# Patient Record
Sex: MALE | Race: BLACK OR AFRICAN AMERICAN | Employment: UNEMPLOYED | URBAN - METROPOLITAN AREA
[De-identification: names, ages, dates, MRNs, and addresses within clinical notes are randomized per-mention and may not be internally consistent; named-entity substitution may affect disease eponyms.]

---

## 2018-01-22 ENCOUNTER — OFFICE VISIT (OUTPATIENT)
Dept: FAMILY MEDICINE CLINIC | Age: 48
End: 2018-01-22

## 2018-01-22 VITALS
DIASTOLIC BLOOD PRESSURE: 96 MMHG | OXYGEN SATURATION: 99 % | TEMPERATURE: 97.6 F | HEART RATE: 64 BPM | HEIGHT: 73 IN | RESPIRATION RATE: 16 BRPM | SYSTOLIC BLOOD PRESSURE: 147 MMHG | BODY MASS INDEX: 31.14 KG/M2 | WEIGHT: 235 LBS

## 2018-01-22 DIAGNOSIS — B35.1 ONYCHOMYCOSIS: ICD-10-CM

## 2018-01-22 DIAGNOSIS — Z00.00 VISIT FOR WELL MAN HEALTH CHECK: Primary | ICD-10-CM

## 2018-01-22 RX ORDER — CICLOPIROX 80 MG/ML
SOLUTION TOPICAL
Qty: 1 BOTTLE | Refills: 1 | Status: SHIPPED | OUTPATIENT
Start: 2018-01-22 | End: 2018-07-30

## 2018-01-22 NOTE — MR AVS SNAPSHOT
[address] 
[phone] Patient: Rosalba Gipson MRN: [id number] GXF:[birth year] Visit Information Date & Time Provider Department Dept. Phone Encounter #  
 1/22/2018  9:45 AM Kleber Mensah MD [address] [place] [address] [phone] [id number] Upcoming Health Maintenance Date Due DTaP/Tdap/Td series (1 - Tdap) 5/11/1993 Influenza Age 5 to Adult 8/1/2017 Allergies as of 1/22/2018  Review Complete On: 1/22/2018 By: Kleber Mensah MD  
 No Known Allergies Current Immunizations  Never Reviewed No immunizations on file. Not reviewed this visit You Were Diagnosed With   
  
 Codes Comments Visit for [facility] health check    -  Primary ICD-10-CM: Z00.00 ICD-9-CM: V70.0 Onychomycosis     ICD-10-CM: B35.1 ICD-9-CM: 110.1 Vitals BP Pulse Temp Resp Height(growth percentile) Weight(growth percentile) (!) 147/96 64 97.6 °F (36.4 °C) (Oral) 16 6' 1\" (1.854 m) 235 lb (106.6 kg) SpO2 BMI Smoking Status 99% 31 kg/m2 Never Smoker Vitals History BMI and BSA Data Body Mass Index Body Surface Area  
 31 kg/m 2 2.34 m 2 Preferred Pharmacy Pharmacy Name Phone [id number], [address] [place], [place] [phone] Your Updated Medication List  
  
   
This list is accurate as of: 1/22/18  4:39 PM.  Always use your most recent med list.  
  
  
  
  
 ciclopirox 8 % solution Commonly known as:  PENLAC Apply to affected toenail daily. Remove weekly. Prescriptions Sent to Pharmacy Refills  
 ciclopirox (PENLAC) 8 % solution 1 Sig: Apply to affected toenail daily. Remove weekly. Class: Normal  
 [id number], [address] [place] [address] #: [phone] We Performed the Following CBC W/O DIFF [97883 CPT(R)] LIPID PANEL [04593 CPT(R)] METABOLIC PANEL, COMPREHENSIVE [24889 CPT(R)] TSH 3RD GENERATION [23464 CPT(R)] Introducing [facility]! [employer] introduces [employer] patient portal. Now you can access parts of your medical record, email your doctor's office, and request medication refills online. 1. In your internet browser, go to [email or web address]. [employer] 2. Click on the First Time User? Click Here link in the Sign In box. You will see the New Member Sign Up page. 3. Enter your [employer] Access Code exactly as it appears below. You will not need to use this code after youve completed the sign-up process. If you do not sign up before the expiration date, you must request a new code. · [employer] Access Code: [email or web address] Expires: 4/22/2018  4:39 PM 
 
4. Enter the last four digits of your Social Security Number (xxxx) and Date of Birth (mm/dd/yyyy) as indicated and click Submit. You will be taken to the next sign-up page. 5. Create a [employer] ID. This will be your [employer] login ID and cannot be changed, so think of one that is secure and easy to remember. 6. Create a [employer] password. You can change your password at any time. 7. Enter your Password Reset Question and Answer. This can be used at a later time if you forget your password. 8. Enter your e-mail address. You will receive e-mail notification when new information is available in [address] 9. Click Sign Up. You can now view and download portions of your medical record. 10. Click the Download Summary menu link to download a portable copy of your medical information. If you have questions, please visit the Frequently Asked Questions section of the [employer] website. Remember, [employer] is NOT to be used for urgent needs. For medical emergencies, dial 911. Now available from your iPhone and Android! Please provide this summary of care documentation to your next provider. If you have any questions after today's visit, please call [phone].

## 2018-01-22 NOTE — PROGRESS NOTES
Jeanna Lopez is a 41 y.o. male who presents for annual physical and to establish care. No significant PMHx. He is currently followed by [facility] for low back pain that started with moving a sofa. He was taking gabapentin for radicular sx but stopped. He is currently doing PT. No other active medical problems and no medications. Reports a good diet. Exercises regularly (decreased recently due to back pain). Reports good sleep. Has 3 sons (93,12,88). PMHx:None  PSHx: Appendectomy; tonsillectomy     Meds: none    Allergies: Allergies no known allergies    Smoker:  History   Smoking Status    Never Smoker   Smokeless Tobacco    Never Used       ETOH:   History   Alcohol Use    Yes       FH: No family history on file. ROS:  General/Constitutional:   No headache, fever, fatigue, weight loss or weight gain       Eyes:   No redness, pruritis, pain, visual changes, swelling, or discharge      Ears:    No pain, loss or changes in hearing     Neck:   No swelling, masses, stiffness, pain, or limited movement     Cardiac:    No chest pain      Respiratory:   No cough or shortness of breath     GI:   No nausea/vomiting, diarrhea, abdominal pain, bloody or dark stools       :   No dysuria or  hematuria    Neurological:   No loss of consciousness, dizziness, seizures, dysarthria, cognitive changes, memory changes,  problems with balance, or unilateral weakness     Skin: No rash     Physical Exam:  Visit Vitals    BP (!) 147/96    Pulse 64    Temp 97.6 °F (36.4 °C) (Oral)    Resp 16    Ht 6' 1\" (1.854 m)    Wt 235 lb (106.6 kg)    SpO2 99%    BMI 31 kg/m2     GEN: No apparent distress. Alert and oriented and responds to all questions appropriately. EYES:  Conjunctiva clear; pupils round and reactive to light; extraocular movements are intact. EAR: External ears are normal.  Tympanic membranes are clear and without effusion. NOSE: Turbinates are within normal limits.   No drainage  OROPHYARYNX: No oral lesions or exudates. NECK:  Supple; no masses; thyroid normal           LUNGS: Respirations unlabored; clear to auscultation bilaterally  CARDIOVASCULAR: Regular, rate, and rhythm without murmurs, gallops or rubs   ABDOMEN: Soft; nontender; nondistended; normoactive bowel sounds; no masses or organomegaly  NEUROLOGIC:  No focal neurologic deficits. Strength and sensation grossly intact. Coordination and gait grossly intact. EXT: Well perfused. No edema. SKIN: No obvious rashes. Assessment:    ICD-10-CM ICD-9-CM    1. Visit for well man health check N47.30 W63.8 METABOLIC PANEL, COMPREHENSIVE      LIPID PANEL      CBC W/O DIFF      TSH 3RD GENERATION   2. Onychomycosis B35.1 110.1        Plan:  Labs as above. Discussed diet and exercise  Will start PenLac for Onychomycosis of the right 4th and 5th toes  Declined flu vaccine  Tdap up to date  He will monitor BP at home. If staying above 140/90 then will f/u in clinic.      RTC: 1 year and PRN

## 2018-01-23 LAB
ALBUMIN SERPL-MCNC: 4.5 G/DL (ref 3.5–5.5)
ALBUMIN/GLOB SERPL: 1.6 {RATIO} (ref 1.2–2.2)
ALP SERPL-CCNC: 62 IU/L (ref 39–117)
ALT SERPL-CCNC: 28 IU/L (ref 0–44)
AST SERPL-CCNC: 26 IU/L (ref 0–40)
BILIRUB SERPL-MCNC: 0.4 MG/DL (ref 0–1.2)
BUN SERPL-MCNC: 13 MG/DL (ref 6–24)
BUN/CREAT SERPL: 10 (ref 9–20)
CALCIUM SERPL-MCNC: 9.2 MG/DL (ref 8.7–10.2)
CHLORIDE SERPL-SCNC: 101 MMOL/L (ref 96–106)
CHOLEST SERPL-MCNC: 186 MG/DL (ref 100–199)
CO2 SERPL-SCNC: 25 MMOL/L (ref 18–29)
CREAT SERPL-MCNC: 1.26 MG/DL (ref 0.76–1.27)
ERYTHROCYTE [DISTWIDTH] IN BLOOD BY AUTOMATED COUNT: 13.7 % (ref 12.3–15.4)
GLOBULIN SER CALC-MCNC: 2.8 G/DL (ref 1.5–4.5)
GLUCOSE SERPL-MCNC: 113 MG/DL (ref 65–99)
HCT VFR BLD AUTO: 39.6 % (ref 37.5–51)
HDLC SERPL-MCNC: 38 MG/DL
HGB BLD-MCNC: 13.9 G/DL (ref 13–17.7)
INTERPRETATION, 910389: NORMAL
LDLC SERPL CALC-MCNC: 113 MG/DL (ref 0–99)
MCH RBC QN AUTO: 27.4 PG (ref 26.6–33)
MCHC RBC AUTO-ENTMCNC: 35.1 G/DL (ref 31.5–35.7)
MCV RBC AUTO: 78 FL (ref 79–97)
PLATELET # BLD AUTO: 243 X10E3/UL (ref 150–379)
POTASSIUM SERPL-SCNC: 3.7 MMOL/L (ref 3.5–5.2)
PROT SERPL-MCNC: 7.3 G/DL (ref 6–8.5)
RBC # BLD AUTO: 5.07 X10E6/UL (ref 4.14–5.8)
SODIUM SERPL-SCNC: 142 MMOL/L (ref 134–144)
TRIGL SERPL-MCNC: 176 MG/DL (ref 0–149)
TSH SERPL DL<=0.005 MIU/L-ACNC: 1.68 UIU/ML (ref 0.45–4.5)
VLDLC SERPL CALC-MCNC: 35 MG/DL (ref 5–40)
WBC # BLD AUTO: 2.9 X10E3/UL (ref 3.4–10.8)

## 2018-01-26 ENCOUNTER — TELEPHONE (OUTPATIENT)
Dept: FAMILY MEDICINE CLINIC | Age: 48
End: 2018-01-26

## 2018-01-26 NOTE — TELEPHONE ENCOUNTER
Pt requesting cheaper alternative for Penlac sent [facility] pharmacy.  Thank you

## 2018-01-26 NOTE — TELEPHONE ENCOUNTER
----- Message from Rianna Monzon sent at 1/26/2018  9:12 AM EST -----  Regarding: Dr. Clark Buerger is asking for a alternate medication for his most recent medication (name unknown) sent to [facility] on [address] Best Contact [phone].

## 2018-01-30 ENCOUNTER — TELEPHONE (OUTPATIENT)
Dept: FAMILY MEDICINE CLINIC | Age: 48
End: 2018-01-30

## 2018-01-30 NOTE — TELEPHONE ENCOUNTER
----- Message from Mikey Cowden sent at 1/30/2018  2:30 PM EST -----  Regarding: Dr. Katt Pickard, from [facility] is calling to check the status of a Pre-authorization that was faxed on 01/25/2018. Best contact [phone].

## 2018-07-30 ENCOUNTER — OFFICE VISIT (OUTPATIENT)
Dept: FAMILY MEDICINE CLINIC | Age: 48
End: 2018-07-30

## 2018-07-30 ENCOUNTER — HOSPITAL ENCOUNTER (EMERGENCY)
Age: 48
Discharge: HOME | End: 2018-07-30
Attending: EMERGENCY MEDICINE
Payer: COMMERCIAL

## 2018-07-30 VITALS
BODY MASS INDEX: 28.49 KG/M2 | TEMPERATURE: 98.4 F | OXYGEN SATURATION: 98 % | WEIGHT: 215 LBS | RESPIRATION RATE: 16 BRPM | HEIGHT: 73 IN | HEART RATE: 58 BPM | DIASTOLIC BLOOD PRESSURE: 108 MMHG | SYSTOLIC BLOOD PRESSURE: 161 MMHG

## 2018-07-30 VITALS
WEIGHT: 215 LBS | SYSTOLIC BLOOD PRESSURE: 169 MMHG | TEMPERATURE: 98.4 F | BODY MASS INDEX: 28.49 KG/M2 | HEART RATE: 68 BPM | OXYGEN SATURATION: 98 % | HEIGHT: 73 IN | DIASTOLIC BLOOD PRESSURE: 109 MMHG | RESPIRATION RATE: 16 BRPM

## 2018-07-30 DIAGNOSIS — Z23 NEED FOR TDAP VACCINATION: ICD-10-CM

## 2018-07-30 DIAGNOSIS — I10 ESSENTIAL HYPERTENSION: Primary | ICD-10-CM

## 2018-07-30 DIAGNOSIS — Z91.09 ENVIRONMENTAL ALLERGIES: ICD-10-CM

## 2018-07-30 DIAGNOSIS — R94.31 ABNORMAL EKG: ICD-10-CM

## 2018-07-30 DIAGNOSIS — I11.9 LVH (LEFT VENTRICULAR HYPERTROPHY) DUE TO HYPERTENSIVE DISEASE, WITHOUT HEART FAILURE: ICD-10-CM

## 2018-07-30 DIAGNOSIS — R06.83 SNORING: ICD-10-CM

## 2018-07-30 DIAGNOSIS — I10 ESSENTIAL HYPERTENSION: ICD-10-CM

## 2018-07-30 DIAGNOSIS — J02.9 SORE THROAT: Primary | ICD-10-CM

## 2018-07-30 LAB
S PYO AG THROAT QL: NEGATIVE
VALID INTERNAL CONTROL?: YES

## 2018-07-30 PROCEDURE — 93005 ELECTROCARDIOGRAM TRACING: CPT

## 2018-07-30 PROCEDURE — 99283 EMERGENCY DEPT VISIT LOW MDM: CPT

## 2018-07-30 RX ORDER — FLUTICASONE PROPIONATE 50 MCG
SPRAY, SUSPENSION (ML) NASAL
Qty: 1 BOTTLE | Refills: 3 | Status: SHIPPED | OUTPATIENT
Start: 2018-07-30

## 2018-07-30 RX ORDER — LISINOPRIL 10 MG/1
10 TABLET ORAL DAILY
Qty: 30 TAB | Refills: 0 | Status: SHIPPED | OUTPATIENT
Start: 2018-07-30 | End: 2018-08-20 | Stop reason: ALTCHOICE

## 2018-07-30 RX ORDER — GABAPENTIN 300 MG/1
300 CAPSULE ORAL
COMMUNITY
Start: 2017-12-28 | End: 2018-07-30

## 2018-07-30 NOTE — MR AVS SNAPSHOT
[address] 
[phone] Patient: Eusebio Samuels MRN: [id number] NXA: Visit Information Date & Time Provider Department Dept. Phone Encounter #  
 2018  6:35 PM Carmelita Biggs MD [address] [facility] [phone] [id number] Upcoming Health Maintenance Date Due DTaP/Tdap/Td series (1 - Tdap) 1993 Influenza Age 5 to Adult 2018 Allergies as of 2018  Review Complete On: 2018 By: Carmelita Biggs MD  
 No Known Allergies Current Immunizations  Never Reviewed No immunizations on file. Not reviewed this visit You Were Diagnosed With   
  
 Codes Comments Sore throat    -  Primary ICD-10-CM: J02.9 ICD-9-CM: 720 Snoring     ICD-10-CM: R06.83 
ICD-9-CM: 786.09 StopBang = 6 Essential hypertension     ICD-10-CM: I10 
ICD-9-CM: 401.9 Environmental allergies     ICD-10-CM: Z91.09 
ICD-9-CM: V15.09 Vitals BP Pulse Temp Resp Height(growth percentile) Weight(growth percentile) (!) 169/109 (BP 1 Location: Left arm, BP Patient Position: Sitting) 68 98.4 °F (36.9 °C) (Oral) 16 6' 1\" (1.854 m) 215 lb (97.5 kg) SpO2 BMI Smoking Status 98% 28.37 kg/m2 Never Smoker Vitals History BMI and BSA Data Body Mass Index Body Surface Area  
 28.37 kg/m 2 2.24 m 2 Preferred Pharmacy [facility] Phone [id number], [address] [facility] [phone] Your Updated Medication List  
  
   
This list is accurate as of 18  7:15 PM.  Always use your most recent med list.  
  
  
  
  
 fluticasone 50 mcg/actuation nasal spray Commonly known as:  Marsh Fails 2 puffs each nostril at bedtime Prescriptions Sent to Pharmacy Refills  
 fluticasone (FLONASE) 50 mcg/actuation nasal spray 3 Si puffs each nostril at bedtime  Class: Normal  
 Pharmacy: [facility] #[id number] #: [phone] We Performed the Following AMB POC EKG ROUTINE W/ 12 LEADS, INTER & REP [01869 CPT(R)] AMB POC RAPID STREP A [38798 CPT(R)] REFERRAL TO SLEEP STUDIES [REF99 Custom] Comments:  
 See Dr. Jaycee Costello for sleep evaluation #[phone] Referral Information Referral ID Referred By Referred To  
  
 [id number] MD Robi Santa [address] [address] Phone: [phone] Fax: [phone] Visits Status Start Date End Date 1 New Request 7/30/18 7/30/19 If your referral has a status of pending review or denied, additional information will be sent to support the outcome of this decision. Patient Instructions Aspirin 162 mg given 7:15PM 
 
Go to [facility] ER Come back soon for Tetanus vaccine and further discussion about blood pressure and snoring Introducing [facility]! [facility] introduces [employer] patient portal. Now you can access parts of your medical record, email your doctor's office, and request medication refills online. 1. In your internet browser, go to [email or web address]. [employer] 2. Click on the First Time User? Click Here link in the Sign In box. You will see the New Member Sign Up page. 3. Enter your [employer] Access Code exactly as it appears below. You will not need to use this code after youve completed the sign-up process. If you do not sign up before the expiration date, you must request a new code. · [employer] Access Code: [email or web address] Expires: 10/28/2018  6:29 PM 
 
4. Enter the last four digits of your Social Security Number (xxxx) and Date of Birth (mm/dd/yyyy) as indicated and click Submit. You will be taken to the next sign-up page. 5. Create a [employer] ID. This will be your [employer] login ID and cannot be changed, so think of one that is secure and easy to remember. 6. Create a [employer] password. You can change your password at any time. 7. Enter your Password Reset Question and Answer. This can be used at a later time if you forget your password. 8. Enter your e-mail address. You will receive e-mail notification when new information is available in [address] 9. Click Sign Up. You can now view and download portions of your medical record. 10. Click the Download Summary menu link to download a portable copy of your medical information. If you have questions, please visit the Frequently Asked Questions section of the [employer] website. Remember, [employer] is NOT to be used for urgent needs. For medical emergencies, dial 911. Now available from your iPhone and Android! Please provide this summary of care documentation to your next provider. If you have any questions after today's visit, please call [phone].

## 2018-07-30 NOTE — PATIENT INSTRUCTIONS
Aspirin 162 mg given 7:15PM 
 
Go to [facility] ER Come back soon for Tetanus vaccine and further discussion about blood pressure and snoring

## 2018-07-30 NOTE — ED TRIAGE NOTES
Pt sent by PCP Dr. Sandrita Beckwith for abnormal EKG and hypertension. Pt was at office for sore throat. Pt denies CP.

## 2018-07-30 NOTE — PROGRESS NOTES
Min Etienne is a 57 y.o. male Issues discussed today include: 
 
 
 
Signs and symptoms:  ST 
Duration:  4 days Context:  No known sick contacts Location:  ST 
Quality:  sore Severity:  8/10 Timing:  Getting some better Modifying factors:  BP up, +snorer (StopBang = 6) Body mass index is 28.37 kg/(m^2). Data reviewed or ordered today:  Strep negative, EKG today= flipped T in inf/lateral leads. No ST elevation Other problems include: There is no problem list on file for this patient. Medications:  none Allergies: 
No Known Allergies LMP:  No LMP for male patient. Social History Social History  Marital status: SINGLE Spouse name: N/A  
 Number of children: N/A  
 Years of education: N/A Occupational History  Not on file. Social History Main Topics  Smoking status: Never Smoker  Smokeless tobacco: Never Used  Alcohol use Yes  Drug use: No  
 Sexual activity: Yes  
  Partners: Female Birth control/ protection: Condom Other Topics Concern  Not on file Social History Narrative Family History Problem Relation Age of Onset  Hypertension Mother  Diabetes Mother  Hypertension Father Other family history:   
 
Meaningful use:  done ROS: 
Headaches:  no 
Chest Pain:  no 
SOB:  no 
Fevers:  no Falls:  no Other significant ROS:   
 
No LMP for male patient. Physical Exam 
Visit Vitals  BP (!) 169/109 (BP 1 Location: Left arm, BP Patient Position: Sitting)  Pulse 68  Temp 98.4 °F (36.9 °C) (Oral)  Resp 16  
 Ht 6' 1\" (1.854 m)  Wt 215 lb (97.5 kg)  SpO2 98%  BMI 28.37 kg/m2 BP Readings from Last 3 Encounters:  
07/30/18 (!) 184/112  
07/30/18 (!) 169/109  
01/22/18 (!) 147/96 Constitutional:  Appears well,  No Acute Distress, Vitals noted Psychiatric:   Affect normal, Alert and cooperative, Oriented to person/place/time Eyes:   Pupils equally round and reactive, EOMI, conjunctiva clear, eyelids normal 
ENT:   External ears and nose normal/lips, teeth=OK/gums normal, TMs and Orophyarynx normal 
Neck:   general inspection and Thyroid normal.  No abnormal cervical or supraclavicular nodes Lungs:   clear to auscultation, good respiratory effort Heart: Ausculation normal.  Regular rhythm. No cardiac murmurs. No carotid bruits or palpable thrills Chest wall normal 
Abdominal exam:   normal.  Liver and spleen normal.  No bruits/masses/tenderness Extremities:   without edema, good peripheral pulses Skin:   Warm to palpation, without rashes, bruising, or suspicious lesions Neck 18 inches Assessment:   
There is no problem list on file for this patient. Today's diagnoses are: ICD-10-CM ICD-9-CM 1. Sore throat J02.9 462 AMB POC RAPID STREP A  
2. Snoring R06.83 786.09 REFERRAL TO SLEEP STUDIES StopBang = 6, needs sleep eval 
  
3. Essential hypertension I10 401.9 AMB POC EKG ROUTINE W/ 12 LEADS, INTER & REP  
 new diagnosis, is on NO Rx yet 4. Environmental allergies Z91.09 V15.09 fluticasone (FLONASE) 50 mcg/actuation nasal spray 5. Abnormal EKG R94.31 794.31   
 to ER 6. Need for Tdap vaccination Z23 V06.1 defer vaccine today Plan: 
Orders Placed This Encounter  REFERRAL TO SLEEP STUDIES Referral Priority:   Routine Referral Type:   Consultation Referral Reason:   Specialty Services Required Referral Location:   [facility] Referred to Provider:   Albina Weaver MD  
  Requested Specialty:   Sleep Medicine  AMB POC RAPID STREP A  
 AMB POC EKG ROUTINE W/ 12 LEADS, INTER & REP Order Specific Question:   Reason for Exam: Answer:   hypertension  DISCONTD: gabapentin (NEURONTIN) 300 mg capsule Sig: Take 300 mg by mouth.  fluticasone (FLONASE) 50 mcg/actuation nasal spray Si puffs each nostril at bedtime Dispense:  1 Bottle Refill:  3 See patient instructions Patient Instructions Aspirin 162 mg given 7:15PM 
 
Go to [facility] ER Come back soon for Tetanus vaccine and further discussion about blood pressure and snoring 
 
 
 
refresh note:  done AVS Printed:  done Diagnoses and all orders for this visit: 1. Sore throat -     AMB POC RAPID STREP A 
 
2. Snoring Comments: 
StopBang = 6, needs sleep eval 
 
Orders: 
-     REFERRAL TO SLEEP STUDIES 3. Essential hypertension Comments: 
new diagnosis, is on NO Rx yet Orders: -     AMB POC EKG ROUTINE W/ 12 LEADS, INTER & REP 4. Environmental allergies 
-     fluticasone (FLONASE) 50 mcg/actuation nasal spray; 2 puffs each nostril at bedtime 5. Abnormal EKG Comments: 
to ER 6. Need for Tdap vaccination Comments: 
defer vaccine today Patient left in stable condition to go to ER with wife driving. Aspirin 162 mg given ER notified

## 2018-07-30 NOTE — PROGRESS NOTES
Chief Complaint Patient presents with  Sore Throat  
  4 days sore throat. Pain reached 10 in throat. Throat feels scratchy, raw and inflammed. No fever, Nasal drainage down throat. Occasional cough, but inconsistent. 1. Have you been to the ER, urgent care clinic since your last visit? Hospitalized since your last visit? No 
 
2. Have you seen or consulted any other health care providers outside of the [address] [place] [address] since your last visit? Include any pap smears or colon screening.  No

## 2018-07-31 LAB
ATRIAL RATE: 58 BPM
CALCULATED P AXIS, ECG09: 39 DEGREES
CALCULATED R AXIS, ECG10: -7 DEGREES
CALCULATED T AXIS, ECG11: -70 DEGREES
DIAGNOSIS, 93000: NORMAL
P-R INTERVAL, ECG05: 230 MS
Q-T INTERVAL, ECG07: 410 MS
QRS DURATION, ECG06: 80 MS
QTC CALCULATION (BEZET), ECG08: 402 MS
VENTRICULAR RATE, ECG03: 58 BPM

## 2018-07-31 NOTE — DISCHARGE INSTRUCTIONS
Learning About ACE Inhibitors  Introduction    ACE (angiotensin-converting enzyme) inhibitors stop the release of an enzyme. This enzyme makes your blood vessels smaller. Without it, your blood vessels relax and get bigger. This lowers your blood pressure. These medicines also increase how much water and salt go into your urine. This also lowers blood pressure. You may take this kind of medicine if you have high blood pressure. Or you may take it if you have heart problems, kidney problems, or diabetes. If you have coronary artery disease, this medicine can help prevent heart attacks and strokes. Examples  · Benazepril (Lotensin)  · Lisinopril (Prinivil, Zestril)  · Ramipril (Altace)  This is not a complete list.  Possible side effects  Side effects may include:  · A cough. · Low blood pressure. This can make you feel dizzy or weak. · Too much potassium in your body. · Swelling. This may be a sign of an allergic reaction. You may have other side effects or reactions not listed here. Check the information that comes with your medicine. What to know about taking this medicine  · ACE inhibitors can cause a dry cough. Talk to your doctor if you have a dry cough. You may need a different medicine. · These medicines can cause an allergic reaction. This can cause a little swelling. Or it can cause red bumps on your skin that hurt. In rare cases, the swelling may make it hard for you to breathe. · Do not take this medicine if you are pregnant. And don't take it if you plan to become pregnant. · Take your medicines exactly as prescribed. Call your doctor if you think you are having a problem with your medicine. · Check with your doctor or pharmacist before you use any other medicines. This includes over-the-counter medicines. Make sure your doctor knows all of the medicines, vitamins, herbal products, and supplements you take. Taking some medicines together can cause problems.   · You may need regular blood tests.  Where can you learn more? Go to http://omar-cornelius.info/. Enter P050 in the search box to learn more about \"Learning About ACE Inhibitors. \"  Current as of: December 6, 2017  Content Version: 11.7  © 5425-8627 [employer]. Care instructions adapted under license by [employer] (which disclaims liability or warranty for this information). If you have questions about a medical condition or this instruction, always ask your healthcare professional. [address] any warranty or liability for your use of this information. Home Blood Pressure Test: About This Test  What is it? A home blood pressure test allows you to keep track of your blood pressure at home. Blood pressure is a measure of the force of blood against the walls of your arteries. Blood pressure readings include two numbers, such as 130/80 (say \"130 over 80\"). The first number is the systolic pressure. The second number is the diastolic pressure. Why is this test done? You may do this test at home to:  · Find out if you have high blood pressure. · Track your blood pressure if you have high blood pressure. · Track how well medicine is working to reduce high blood pressure. · Check how lifestyle changes, such as weight loss and exercise, are affecting blood pressure. How can you prepare for the test?  · Do not use caffeine, tobacco, or medicines known to raise blood pressure (such as nasal decongestant sprays) for at least 30 minutes before taking your blood pressure. · Do not exercise for at least 30 minutes before taking your blood pressure. What happens before the test?  Take your blood pressure while you feel comfortable and relaxed. Sit quietly with both feet on the floor for at least 5 minutes before the test.  What happens during the test?  · Sit with your arm slightly bent and resting on a table so that your upper arm is at the same level as your heart.   · Roll up your sleeve or take off your shirt to expose your upper arm. · Wrap the blood pressure cuff around your upper arm so that the lower edge of the cuff is about 1 inch above the bend of your elbow. Proceed with the following steps depending on if you are using an automatic or manual pressure monitor. Automatic blood pressure monitors  · Press the on/off button on the automatic monitor and wait until the ready-to-measure \"heart\" symbol appears next to zero in the display window. · Press the start button. The cuff will inflate and deflate by itself. · Your blood pressure numbers will appear on the screen. · Write your numbers in your log book, along with the date and time. Manual blood pressure monitors  · Place the earpieces of a stethoscope in your ears, and place the bell of the stethoscope over the artery, just below the cuff. · Close the valve on the rubber inflating bulb. · Squeeze the bulb rapidly with your opposite hand to inflate the cuff until the dial or column of mercury reads about 30 mm Hg higher than your usual systolic pressure. If you do not know your usual pressure, inflate the cuff to 210 mm Hg or until the pulse at your wrist disappears. · Open the pressure valve just slightly by twisting or pressing the valve on the bulb. · As you watch the pressure slowly fall, note the level on the dial at which you first start to hear a pulsing or tapping sound through the stethoscope. This is your systolic blood pressure. · Continue letting the air out slowly. The sounds will become muffled and will finally disappear. Note the pressure when the sounds completely disappear. This is your diastolic blood pressure. Let out all the remaining air. · Write your numbers in your log book, along with the date and time. What else should you know about the test?  Here are the categories of blood pressure for adults:  Ideal blood pressure. Systolic is less than 725, and diastolic is less than 80.   Elevated blood pressure. Systolic is 884 to 153, and diastolic is less than 80. High blood pressure (hypertension). Systolic is 837 or above. Diastolic is 80 or above. One or both numbers may be high. It is more accurate to take the average of several readings made throughout the day than to rely on a single reading. Follow-up care is a key part of your treatment and safety. Be sure to make and go to all appointments, and call your doctor if you are having problems. It's also a good idea to keep a list of the medicines you take. Where can you learn more? Go to http://omar-cornelius.info/. Enter C427 in the search box to learn more about \"Home Blood Pressure Test: About This Test.\"  Current as of: December 6, 2017  Content Version: 11.7  © 7937-8345 [employer]. Care instructions adapted under license by [employer] (which disclaims liability or warranty for this information). If you have questions about a medical condition or this instruction, always ask your healthcare professional. [address] any warranty or liability for your use of this information.

## 2018-07-31 NOTE — ED NOTES
I have reviewed discharge instructions with the patient. The patient verbalized understanding. Pt confirmed understanding of need for follow up with primary care provider. Important sections of discharge instructions highlighted for patient. Sunshine Costello Pt is not in any current distress and shows no evidence of clinical instability. Pt is hemodynamically/respiratorily stable. Armband removed. Paperwork given by provider and reviewed with patient, opportunity for questions/clarification given. Pt denies any additional complaints. Pt ambulatory out of ED. Patient Vitals for the past 4 hrs: 
 Temp Pulse Resp BP SpO2  
07/30/18 2045 - - - (!) 161/108 98 % 07/30/18 2030 - - - (!) 158/110 96 % 07/30/18 2015 - - - (!) 173/109 98 % 07/30/18 1941 98.4 °F (36.9 °C) (!) 58 16 (!) 184/112 98 % Sunshine Cotsello

## 2018-07-31 NOTE — ED PROVIDER NOTES
HPI Comments: 57 y.o. male with no significant past medical history who presents from PCP office with chief complaint of sore throat. Pt states that he went to his PCP today for a sore throat and he was hypertensive at that time with EKG abnormalities so his PCP sent him to the ED for evaluation. He reports a hx of seasonal allergies and has allergy medications and Nasacort at home but has not been using them. No hx of BP medication use. Pt denies chest pain, SOB, leg swelling, N/V/D. There are no other acute medical concerns at this time. Social hx: no tobacco use; (+) EtOH use PCP: Dr. Vida Mascorro Note written by Loyal Favre, Scribe, as dictated by Digna Flores MD 8:15 PM 
 
The history is provided by the patient and the spouse. No  was used. No past medical history on file. No past surgical history on file. Family History:  
Problem Relation Age of Onset  Hypertension Mother  Diabetes Mother  Hypertension Father Social History Social History  Marital status: SINGLE Spouse name: N/A  
 Number of children: N/A  
 Years of education: N/A Occupational History  Not on file. Social History Main Topics  Smoking status: Never Smoker  Smokeless tobacco: Never Used  Alcohol use Yes  Drug use: No  
 Sexual activity: Yes  
  Partners: Female Birth control/ protection: Condom Other Topics Concern  Not on file Social History Narrative ALLERGIES: Review of patient's allergies indicates no known allergies. Review of Systems Constitutional: Negative for chills and fever. HENT: Positive for sore throat. Respiratory: Negative for shortness of breath. Cardiovascular: Negative for chest pain and leg swelling. Gastrointestinal: Negative for diarrhea, nausea and vomiting. All other systems reviewed and are negative. Vitals:  
 07/30/18 1941 07/30/18 2015 07/30/18 2030 07/30/18 2045 BP: (!) 184/112 (!) 173/109 (!) 158/110 (!) 161/108 Pulse: (!) 58 Resp: 16 Temp: 98.4 °F (36.9 °C) SpO2: 98% 98% 96% 98% Weight: 97.5 kg (215 lb) Height: 6' 1\" (1.854 m) Physical Exam  
Constitutional: He is oriented to person, place, and time. He appears well-developed and well-nourished. No distress. HENT:  
Head: Normocephalic and atraumatic. Eyes: Conjunctivae are normal.  
Neck: Neck supple. No tracheal deviation present. Cardiovascular: Normal rate, regular rhythm and normal heart sounds. Pulmonary/Chest: Effort normal and breath sounds normal. No respiratory distress. Abdominal: He exhibits no distension. Musculoskeletal: Normal range of motion. Neurological: He is alert and oriented to person, place, and time. Skin: Skin is warm and dry. Psychiatric: He has a normal mood and affect. Nursing note and vitals reviewed. Note written by Sasha Green, as dictated by Sujata Mccurdy MD 8:15 PM 
 
MDM 
 
57 y.o. male presents with sore throat likelt secondary to post-nasal drip. He has h/o allergies. EKG with LVH performed as screening at PCP office. Has undiagnosed HTN which is underlying. Otherwise asymptomatic. Will start on low dose lisinopril and recommend f/u to recheck BP at PCP office. No s/s of ACS and no indication for further emergent testing but will need OP echo. Plan to follow up with PCP as needed and return precautions discussed for worsening or new concerning symptoms. ED Course Procedures ED EKG interpretation: 
Rhythm: sinus bradycardia; and regular . Rate (approx.): 58; 1st degree AV block; LVH with repolarization abnormality.  
Note written by Sasha Green, as dictated by Sujata Mccurdy MD 8:01 PM

## 2018-08-06 DIAGNOSIS — I10 ESSENTIAL HYPERTENSION: ICD-10-CM

## 2018-08-06 DIAGNOSIS — R94.31 ABNORMAL EKG: Primary | ICD-10-CM

## 2018-08-20 ENCOUNTER — OFFICE VISIT (OUTPATIENT)
Dept: FAMILY MEDICINE CLINIC | Age: 48
End: 2018-08-20

## 2018-08-20 VITALS
SYSTOLIC BLOOD PRESSURE: 143 MMHG | DIASTOLIC BLOOD PRESSURE: 93 MMHG | HEART RATE: 69 BPM | HEIGHT: 73 IN | TEMPERATURE: 98.4 F | WEIGHT: 233.6 LBS | RESPIRATION RATE: 16 BRPM | OXYGEN SATURATION: 98 % | BODY MASS INDEX: 30.96 KG/M2

## 2018-08-20 DIAGNOSIS — Z00.00 HEALTHCARE MAINTENANCE: ICD-10-CM

## 2018-08-20 DIAGNOSIS — I10 ESSENTIAL HYPERTENSION: Primary | ICD-10-CM

## 2018-08-20 RX ORDER — LISINOPRIL AND HYDROCHLOROTHIAZIDE 10; 12.5 MG/1; MG/1
1 TABLET ORAL DAILY
Qty: 30 TAB | Refills: 0 | Status: SHIPPED | OUTPATIENT
Start: 2018-08-20 | End: 2018-09-06 | Stop reason: ALTCHOICE

## 2018-08-20 NOTE — PROGRESS NOTES
Identified Patient with two Patient identifiers (Name and ). Two Patient Identifiers confirmed. Reviewed record in preparation for visit and have obtained necessary documentation. Chief Complaint   Patient presents with    Hypertension     2018 [place] Per Dr. Deejay Linares due to Abnormal EKG/Elevated BP     Vitals:    18 1356 18 1403   BP: (!) 157/98 (!) 143/93   BP 1 Location: Left arm Right arm   BP Patient Position: Sitting Sitting   Pulse: 68 69   Resp: 16    Temp: 98.4 °F (36.9 °C)    TempSrc: Oral    SpO2: 98%    Weight: 233 lb 9.6 oz (106 kg)    Height: 6' 1\" (1.854 m)        1. Have you been to the ER, urgent care clinic since your last visit? Hospitalized since your last visit? Yes When: 2018 [address] [place] [address] ED due to Abnormal EKG/Elevated BP    2. Have you seen or consulted any other health care providers outside of the [address] [place] [address] since your last visit? Include any pap smears or colon screening.  No

## 2018-08-20 NOTE — PROGRESS NOTES
CC: BP check     HPI:    Patient was seen at [facility] on 7/30/18 and BP found to be elevated in 864P systolic with inverted T-waves and LVH on EKG. He was sent to ED for evaluation and was discharged on lisinopril. Patient reports taking lisinopril daily every morning. He denies having any cough. He denies any CP, palpitations, SOB, or headaches. He exercises for 1.5hrs 2x/wk. He does try to watch his diet but hasn't specifically cut down on sodium yet. ? Sleep apnea  -Was referred to sleep specialist for sleep study at previous visit but has yet to schedule an appointment    PHQ9 = 0  Today     Review of Systems:    Constitutional: Negative for Fever,chills  CV: negative for CP, palpitations  Resp: negative for SOB, Cough, Wheezing  GI: negative for abdominal pain, n/v/d/c       Current Outpatient Prescriptions:     fluticasone (FLONASE) 50 mcg/actuation nasal spray, 2 puffs each nostril at bedtime, Disp: 1 Bottle, Rfl: 3    lisinopril (PRINIVIL) 10 mg tablet, Take 1 Tab by mouth daily for 30 days. , Disp: 30 Tab, Rfl: 0    No Known Allergies      History reviewed. No pertinent past medical history. Social History     Social History    Marital status: SINGLE     Spouse name: N/A    Number of children: N/A    Years of education: N/A     Occupational History    Not on file.      Social History Main Topics    Smoking status: Never Smoker    Smokeless tobacco: Never Used    Alcohol use Yes    Drug use: No    Sexual activity: Yes     Partners: Female     Birth control/ protection: Condom     Other Topics Concern    Not on file     Social History Narrative          Family History   Problem Relation Age of Onset    Hypertension Mother     Diabetes Mother     Hypertension Father          Physical Exam:     Visit Vitals    BP (!) 143/93 (BP 1 Location: Right arm, BP Patient Position: Sitting)    Pulse 69    Temp 98.4 °F (36.9 °C) (Oral)    Resp 16    Ht 6' 1\" (1.854 m)    Wt 233 lb 9.6 oz (106 kg)  SpO2 98%    BMI 30.82 kg/m2       General appearance: alert, oriented, NAD   HEENT: PERRLA, moist mucus membranes, no LAD  CV: RRR, S1/S2 heard, no m/r/g  Resp: CTAB, no w/r/r  Abdominal: soft, non-tender to palpation, +BS  Extremities: no swelling or edema   Skin: no visible rashes      Assessment/Plan:   1. Essential hypertension: Patient continues to be hypertensive with BPs >391 systolic at home and here in clinic.   -Will switch lisinopril to prinzide  -Patient encouraged to continue to check BPs daily at home and to continue with diet and lifestyle modifications   -RTC 2wks for BP check  -Referral placed for Cardiologist at previous visit due to abnormal EKG. Patient encouraged to call to schedule an appointment.      2. Healthcare maintenance  - TETANUS, DIPHTHERIA TOXOIDS AND ACELLULAR PERTUSSIS VACCINE (TDAP), IN INDIVIDS. >=7, IM      Patient discussed with Dr. Sergio Dial MD  Family Medicine Resident

## 2018-08-20 NOTE — MR AVS SNAPSHOT
[address] 
[phone] Patient: Venancio Munroe MRN: [id number] HSM:[id number] Visit Information Date & Time Provider Department Dept. Phone Encounter #  
 8/20/2018  1:45 PM Everett Craft MD [facility] [phone] [id number] Follow-up Instructions Return in about 2 weeks (around 9/3/2018) for BP check . Upcoming Health Maintenance Date Due DTaP/Tdap/Td series (1 - Tdap) 5/11/1993 Influenza Age 5 to Adult 8/1/2018 Allergies as of 8/20/2018  Review Complete On: 8/20/2018 By: Ralph Hills LPN No Known Allergies Current Immunizations  Never Reviewed Name Date Tdap  Incomplete Not reviewed this visit You Were Diagnosed With   
  
 Codes Comments Essential hypertension    -  Primary ICD-10-CM: I10 
ICD-9-CM: 401.9 Healthcare maintenance     ICD-10-CM: Z00.00 ICD-9-CM: V70.0 Vitals BP Pulse Temp Resp Height(growth percentile) Weight(growth percentile) (!) 143/93 (BP 1 Location: Right arm, BP Patient Position: Sitting) 69 98.4 °F (36.9 °C) (Oral) 16 6' 1\" (1.854 m) 233 lb 9.6 oz (106 kg) SpO2 BMI Smoking Status 98% 30.82 kg/m2 Never Smoker Vitals History BMI and BSA Data Body Mass Index Body Surface Area  
 30.82 kg/m 2 2.34 m 2 Preferred Pharmacy Pharmacy Name Phone [facility] #[id number] [phone] Your Updated Medication List  
  
   
This list is accurate as of 8/20/18  2:32 PM.  Always use your most recent med list.  
  
  
  
  
 fluticasone 50 mcg/actuation nasal spray Commonly known as:  Loy Hopson 2 puffs each nostril at bedtime  
  
 lisinopril-hydroCHLOROthiazide 10-12.5 mg per tablet Commonly known as:  Kacey Shaikh Take 1 Tab by mouth daily for 30 days. Prescriptions Printed Refills lisinopril-hydroCHLOROthiazide (PRINZIDE, ZESTORETIC) 10-12.5 mg per tablet 0 Sig: Take 1 Tab by mouth daily for 30 days. Class: Print Route: Oral  
  
We Performed the Following TETANUS, DIPHTHERIA TOXOIDS AND ACELLULAR PERTUSSIS VACCINE (TDAP), IN INDIVIDS. >=7, IM [id number] CPT(R)] Follow-up Instructions Return in about 2 weeks (around 9/3/2018) for BP check . Patient Instructions Call office of Cardiologist Dr. Salma Kelly at [phone] to set up an appointment. Call office of Dr. Jonathan Thomson to schedule appointment for sleep study at [phone] Introducing [facility] SERVICES! Pamela Patel introduces [employer] patient portal. Now you can access parts of your medical record, email your doctor's office, and request medication refills online. 1. In your internet browser, go to [email or web address] 2. Click on the First Time User? Click Here link in the Sign In box. You will see the New Member Sign Up page. 3. Enter your [employer] Access Code exactly as it appears below. You will not need to use this code after youve completed the sign-up process. If you do not sign up before the expiration date, you must request a new code. · [employer] Access Code: [email or web address] Expires: 10/28/2018  6:29 PM 
 
4. Enter the last four digits of your Social Security Number (xxxx) and Date of Birth (mm/dd/yyyy) as indicated and click Submit. You will be taken to the next sign-up page. 5. Create a Meta[employer] ID. This will be your [employer] login ID and cannot be changed, so think of one that is secure and easy to remember. 6. Create a [employer] password. You can change your password at any time. 7. Enter your Password Reset Question and Answer. This can be used at a later time if you forget your password. 8. Enter your e-mail address. You will receive e-mail notification when new information is available in [address] 9. Click Sign Up. You can now view and download portions of your medical record. 10. Click the Download Summary menu link to download a portable copy of your medical information. If you have questions, please visit the Frequently Asked Questions section of the [employer] website. Remember, [employer] is NOT to be used for urgent needs. For medical emergencies, dial 911. Now available from your iPhone and Android! Please provide this summary of care documentation to your next provider. Your primary care clinician is listed as NONE. If you have any questions after today's visit, please call [phone].

## 2018-09-06 ENCOUNTER — OFFICE VISIT (OUTPATIENT)
Dept: FAMILY MEDICINE CLINIC | Age: 48
End: 2018-09-06

## 2018-09-06 VITALS
HEART RATE: 63 BPM | TEMPERATURE: 98 F | DIASTOLIC BLOOD PRESSURE: 86 MMHG | SYSTOLIC BLOOD PRESSURE: 135 MMHG | BODY MASS INDEX: 30.75 KG/M2 | OXYGEN SATURATION: 98 % | HEIGHT: 73 IN | WEIGHT: 232 LBS

## 2018-09-06 DIAGNOSIS — L98.9 SKIN LESION: ICD-10-CM

## 2018-09-06 DIAGNOSIS — I10 ESSENTIAL HYPERTENSION: Primary | ICD-10-CM

## 2018-09-06 RX ORDER — LISINOPRIL AND HYDROCHLOROTHIAZIDE 12.5; 2 MG/1; MG/1
1 TABLET ORAL DAILY
Qty: 30 TAB | Refills: 2 | Status: SHIPPED | OUTPATIENT
Start: 2018-09-06 | End: 2018-10-18 | Stop reason: SINTOL

## 2018-09-06 RX ORDER — CLOTRIMAZOLE AND BETAMETHASONE DIPROPIONATE 10; .64 MG/G; MG/G
CREAM TOPICAL
Qty: 15 G | Refills: 1 | Status: SHIPPED | OUTPATIENT
Start: 2018-09-06 | End: 2021-05-17

## 2018-09-06 NOTE — PROGRESS NOTES
CC: BP check     HPI:    HTN:   -BPs at home have been 339T systolic over 81Y diastolic   -Denies any CP, palpitations, SOB,   -has appointment to see cardiologist on 9/12  -also has called to schedule appointment for sleep study, awaiting call back from the office    Left foot fungus:   -left foot discoloration in between 4th and 5th digit   -hx of toenail fungus in the past    Right foot discoloration:  -also has Mole on inside of R pinky toe that he reports has been present for about 15yrs and has increased in size      Review of Systems:    Constitutional: negative for Fever,chills  CV: negative for CP, palpitations  Resp: negative for SOB, Cough, Wheezing  GI: negative for abdominal pain, n/v/d/c   Skin: positive for skin changes      Current Outpatient Prescriptions:     lisinopril-hydroCHLOROthiazide (PRINZIDE, ZESTORETIC) 10-12.5 mg per tablet, Take 1 Tab by mouth daily for 30 days. , Disp: 30 Tab, Rfl: 0    fluticasone (FLONASE) 50 mcg/actuation nasal spray, 2 puffs each nostril at bedtime, Disp: 1 Bottle, Rfl: 3    No Known Allergies      History reviewed. No pertinent past medical history. Social History     Social History    Marital status: SINGLE     Spouse name: N/A    Number of children: N/A    Years of education: N/A     Occupational History    Not on file.      Social History Main Topics    Smoking status: Never Smoker    Smokeless tobacco: Never Used    Alcohol use Yes    Drug use: No    Sexual activity: Yes     Partners: Female     Birth control/ protection: Condom     Other Topics Concern    Not on file     Social History Narrative          Family History   Problem Relation Age of Onset    Hypertension Mother     Diabetes Mother     Hypertension Father          Physical Exam:     Visit Vitals    /86    Pulse 63    Temp 98 °F (36.7 °C)    Ht 6' 1\" (1.854 m)    Wt 232 lb (105.2 kg)    SpO2 98%    BMI 30.61 kg/m2       General appearance: alert, oriented, NAD   HEENT: PERRLA, moist mucus membranes, no LAD  CV: RRR, S1/S2 heard, no m/r/g  Resp: CTAB, no w/r/r  Abdominal: soft, non-tender to palpation, +BS  Extremities: no swelling or edema   Foot exam: discoloration in between 4th and 5th digits of left foot, 1-1.5 cm dark black hyperpigmentation present on medial aspect of 5th digit of R foot with asymmetry, irregular borders, differential coloration, and has been enlarging over time     Assessment/Plan:     1. Essential hypertension: BPs still in 140s/80s at home and 135/ 86 today in clinic.   - Will increased prinzide from 10-12.5 to 20-12.5  -Patient to follow up with cardiology for previous abnormal EKG findings and will continue to schedule appointment for sleep study    2. Skin lesion  - Lesion on right 5th digit concerning for possible melanoma given asymetry, irregular borders, differential colors, and increase in size over time. Will refer to Dermatology for further evaluation. 3. Tinea pedis: discoloration on left foot in between 4th and 5th digit is likely of fungal etiology.    -  clotrimazole-betamethasone (LOTRISONE) topical cream; Apply to affected site on foot twice daily for 14 days  Dispense: 15 g; Refill: 1      Patient discussed with Dr. Veronica Flores MD  Family Medicine Resident

## 2018-09-06 NOTE — MR AVS SNAPSHOT
[address] 
[phone] Patient: Luiza Burch MRN: [id number]:8/82/[id number] Visit Information Date & Time Provider Department Dept. Phone Encounter #  
 9/6/2018  1:45 PM Cheryl Mccurdy MD [address] [facility] [phone] [id number] Follow-up Instructions Return in about 2 weeks (around 9/20/2018). Your Appointments 9/12/2018  3:40 PM  
New Patient with Carter Jessica MD  
[address] (36[address] [facility]) Appt Note: [place] Per Dr. Luis Bravo due to Abnormal EKG/Elevated BP  
 [address] [place] [address] 1007 [facility]  
[address] [facility] [place] [address] [address] Upcoming Health Maintenance Date Due Influenza Age 5 to Adult 8/1/2018 DTaP/Tdap/Td series (2 - Td) 8/20/2028 Allergies as of 9/6/2018  Review Complete On: 8/20/2018 By: Cheryl Mccurdy MD  
 No Known Allergies Current Immunizations  Never Reviewed Name Date Tdap 8/20/2018 Not reviewed this visit You Were Diagnosed With   
  
 Codes Comments Skin lesion    -  Primary ICD-10-CM: L98.9 ICD-9-CM: 709.9 Vitals BP Pulse Temp Height(growth percentile) Weight(growth percentile) SpO2  
 135/86 63 98 °F (36.7 °C) 6' 1\" (1.854 m) 232 lb (105.2 kg) 98% BMI Smoking Status 30.61 kg/m2 Never Smoker Vitals History BMI and BSA Data Body Mass Index Body Surface Area  
 30.61 kg/m 2 2.33 m 2 Preferred Pharmacy Pharmacy Name Phone [id number] [id number] [address] [place] [address] [phone] Your Updated Medication List  
  
   
This list is accurate as of 9/6/18  2:34 PM.  Always use your most recent med list.  
  
  
  
  
 clotrimazole-betamethasone topical cream  
Commonly known as:  Delpha Stains  
 Apply to affected site on foot twice daily for 14 days  
  
 fluticasone 50 mcg/actuation nasal spray Commonly known as:  Gautam Cyphers 2 puffs each nostril at bedtime  
  
 lisinopril-hydroCHLOROthiazide 20-12.5 mg per tablet Commonly known as:  Devin Matthewsks Take 1 Tab by mouth daily. Prescriptions Sent to Pharmacy Refills  
 lisinopril-hydroCHLOROthiazide (PRINZIDE, ZESTORETIC) 20-12.5 mg per tablet 2 Sig: Take 1 Tab by mouth daily. Class: Normal  
 Pharmacy: [id number]- [address] Ph #: [phone] Route: Oral  
 clotrimazole-betamethasone (LOTRISONE) topical cream 1 Sig: Apply to affected site on foot twice daily for 14 days Class: Normal  
 Pharmacy: [id number]- [address] Ph #: [phone] We Performed the Following REFERRAL TO DERMATOLOGY [REF19 Custom] Comments:  
 Please call [phone] to schedule an appointment. Follow-up Instructions Return in about 2 weeks (around 9/20/2018). Referral Information Referral ID Referred By Referred To  
  
 [id number] Theodora SHI MD   
   [address] [facility] Phone: [phone] Fax: [phone] Visits Status Start Date End Date 1 New Request 9/6/18 9/6/19 If your referral has a status of pending review or denied, additional information will be sent to support the outcome of this decision. Introducing \[facility] [place] [facility]\"" & HEALTH SERVICES! Loy Hess introduces [employer] patient portal. Now you can access parts of your medical record, email your doctor's office, and request medication refills online. 1. In your internet browser, go to [email or web address] 2. Click on the First Time User? Click Here link in the Sign In box. You will see the New Member Sign Up page. 3. Enter your Imagin[employer] Access Code exactly as it appears below.  You will not need to use this code after youve completed the sign-up process. If you do not sign up before the expiration date, you must request a new code. · [employer] Access Code: [email or web address] Expires: 10/28/2018  6:29 PM 
 
4. Enter the last four digits of your Social Security Number (xxxx) and Date of Birth (mm/dd/yyyy) as indicated and click Submit. You will be taken to the next sign-up page. 5. Create a [employer] ID. This will be your [employer] login ID and cannot be changed, so think of one that is secure and easy to remember. 6. Create a [employer] password. You can change your password at any time. 7. Enter your Password Reset Question and Answer. This can be used at a later time if you forget your password. 8. Enter your e-mail address. You will receive e-mail notification when new information is available in [address] 9. Click Sign Up. You can now view and download portions of your medical record. 10. Click the Download Summary menu link to download a portable copy of your medical information. If you have questions, please visit the Frequently Asked Questions section of the [employer] website. Remember, [employer] is NOT to be used for urgent needs. For medical emergencies, dial 911. Now available from your iPhone and Android! Please provide this summary of care documentation to your next provider. Your primary care clinician is listed as NONE. If you have any questions after today's visit, please call [phone].

## 2018-09-08 NOTE — PROGRESS NOTES
I saw and evaluated the patient, performing the key elements of the service. I discussed the findings, assessment and plan with the resident and agree with the resident's findings and plan as documented in the resident's note.   Agree with derm eval

## 2018-10-01 ENCOUNTER — OFFICE VISIT (OUTPATIENT)
Dept: CARDIOLOGY CLINIC | Age: 48
End: 2018-10-01

## 2018-10-01 VITALS
HEART RATE: 68 BPM | HEIGHT: 73 IN | SYSTOLIC BLOOD PRESSURE: 140 MMHG | OXYGEN SATURATION: 98 % | BODY MASS INDEX: 31.14 KG/M2 | WEIGHT: 235 LBS | DIASTOLIC BLOOD PRESSURE: 78 MMHG | RESPIRATION RATE: 16 BRPM

## 2018-10-01 DIAGNOSIS — I51.7 LVH (LEFT VENTRICULAR HYPERTROPHY): Primary | ICD-10-CM

## 2018-10-01 DIAGNOSIS — R94.31 ABNORMAL EKG: ICD-10-CM

## 2018-10-01 DIAGNOSIS — I10 ESSENTIAL HYPERTENSION: ICD-10-CM

## 2018-10-01 NOTE — PROGRESS NOTES
Reason for Consult: Abnormal EKG. Referring: Kit Delgado MD    HPI: Tiki Aj is a 57 y.o. male with past medical history significant for hypertension is here for evaluation of an abnormal EKG. The first notation of the abnormal EKG was back in July 2018 when he was seen by Dr. Jose Carroll office for sore throat and had an EKG at that time which demonstrated inferior lateral lead T wave inversions. His blood pressure was significantly elevated on that day and he was referred to the ER for further evaluation. An EKG from the ER was available for personal review which have described below. He was evaluated in the ER and was discharged home and he is being referred for further evaluation. Of note he does not have any significant history of chest pain, shortness of breath, lightheadedness or dizziness. There is no significant history of ankle edema. He does not smoke tobacco.  He did occasionally drink alcohol on a social basis. There is no history of IV drug use. He has significant family history of hypertension in both of his parents side. He takes medications for his blood pressure without any significant problems. Lately his blood pressure has been well controlled. EKG from July 30, 2018 was personally reviewed. EKG demonstrated sinus bradycardia heart rate of 58 bpm inferolateral T wave inversions. EKG from today's office visit was also personally reviewed which demonstrated normal sinus rhythm with first-degree AV block with inferior lateral T wave inversions with LVH. Plan:    1. Hypertension: Blood pressure is controlled. Continue current medications. Low-salt diet. Increase aerobic activity. 2.  Abnormal EKG/inferolateral T wave inversions/first-degree AV block: Likely related to hypertension and hypertensive heart disease. Further evaluation with an echocardiogram is recommended to assess for the LV size, LV mass.   Strict blood pressure control including lifestyle modification including low-salt diet is significantly recommended. 3.  Phone follow-up of the test results. No past medical history on file. No past surgical history on file. Family History   Problem Relation Age of Onset    Hypertension Mother     Diabetes Mother     Hypertension Father            Social History     Social History    Marital status: SINGLE     Spouse name: N/A    Number of children: N/A    Years of education: N/A     Occupational History    Not on file. Social History Main Topics    Smoking status: Never Smoker    Smokeless tobacco: Never Used    Alcohol use Yes    Drug use: No    Sexual activity: Yes     Partners: Female     Birth control/ protection: Condom     Other Topics Concern    Not on file     Social History Narrative         No Known Allergies         Current Outpatient Prescriptions   Medication Sig Dispense Refill    lisinopril-hydroCHLOROthiazide (PRINZIDE, ZESTORETIC) 20-12.5 mg per tablet Take 1 Tab by mouth daily. 30 Tab 2    clotrimazole-betamethasone (LOTRISONE) topical cream Apply to affected site on foot twice daily for 14 days 15 g 1    fluticasone (FLONASE) 50 mcg/actuation nasal spray 2 puffs each nostril at bedtime 1 Bottle 3        ROS:  12 point review of systems was performed.  All negative except for HPI     Physical Exam:  Visit Vitals    /78 (BP 1 Location: Left arm, BP Patient Position: Sitting)    Pulse 68    Resp 16    Ht 6' 1\" (1.854 m)    Wt 235 lb (106.6 kg)    SpO2 98%    BMI 31 kg/m2       Gen:  Well-developed, well-nourished, in no acute distress  HEENT:  Pink conjunctivae, PERRL, hearing intact to voice, moist mucous membranes  Neck:  Supple, without masses, thyroid non-tender  Resp:  No accessory muscle use, clear breath sounds without wheezes rales or rhonchi  Card:  No murmurs, normal S1, S2 without thrills, bruits or peripheral edema  Abd:  Soft, non-tender, non-distended, normoactive bowel sounds are present, no palpable organomegaly and no detectable hernias  Lymph:  No cervical or inguinal adenopathy  Musc:  No cyanosis or clubbing  Skin:  No rashes or ulcers, skin turgor is good  Neuro:  Cranial nerves are grossly intact, no focal motor weakness, follows commands appropriately  Psych:  Good insight, oriented to person, place and time, alert     Labs:     Lab Results   Component Value Date/Time    WBC 2.9 (L) 01/22/2018 10:27 AM    HGB 13.9 01/22/2018 10:27 AM    HCT 39.6 01/22/2018 10:27 AM    PLATELET 802 93/67/3397 10:27 AM    MCV 78 (L) 01/22/2018 10:27 AM     Lab Results   Component Value Date/Time    Glucose 113 (H) 01/22/2018 10:27 AM    LDL, calculated 113 (H) 01/22/2018 10:27 AM    Creatinine 1.26 01/22/2018 10:27 AM      Lab Results   Component Value Date/Time    Cholesterol, total 186 01/22/2018 10:27 AM    HDL Cholesterol 38 (L) 01/22/2018 10:27 AM    LDL, calculated 113 (H) 01/22/2018 10:27 AM    Triglyceride 176 (H) 01/22/2018 10:27 AM     Lab Results   Component Value Date/Time    ALT (SGPT) 28 01/22/2018 10:27 AM    AST (SGOT) 26 01/22/2018 10:27 AM    Alk.  phosphatase 62 01/22/2018 10:27 AM    Bilirubin, total 0.4 01/22/2018 10:27 AM    Albumin 4.5 01/22/2018 10:27 AM    Protein, total 7.3 01/22/2018 10:27 AM    PLATELET 405 30/03/6555 10:27 AM     No results found for: INR, PTMR, PTP, PT1, PT2   Lab Results   Component Value Date/Time    GFR est non-AA 68 01/22/2018 10:27 AM    GFR est AA 79 01/22/2018 10:27 AM    Creatinine 1.26 01/22/2018 10:27 AM    BUN 13 01/22/2018 10:27 AM    Sodium 142 01/22/2018 10:27 AM    Potassium 3.7 01/22/2018 10:27 AM    Chloride 101 01/22/2018 10:27 AM    CO2 25 01/22/2018 10:27 AM     No results found for: PSA, Alla Leyden, [id number], [id number], PSALT  Lab Results   Component Value Date/Time    TSH 1.680 01/22/2018 10:27 AM      Lab Results   Component Value Date/Time    Glucose 113 (H) 01/22/2018 10:27 AM      No results found for: CPK, RCK1, RCK2, RCK3, RCK4, CKMB, CKNDX, CKND1, TROPT, TROIQ, BNPP, BNP   No results found for: BNP, BNPP, BNPPPOC, XBNPT, BNPNT   Lab Results   Component Value Date/Time    Sodium 142 01/22/2018 10:27 AM    Potassium 3.7 01/22/2018 10:27 AM    Chloride 101 01/22/2018 10:27 AM    CO2 25 01/22/2018 10:27 AM    Glucose 113 (H) 01/22/2018 10:27 AM    BUN 13 01/22/2018 10:27 AM    Creatinine 1.26 01/22/2018 10:27 AM    BUN/Creatinine ratio 10 01/22/2018 10:27 AM    GFR est AA 79 01/22/2018 10:27 AM    GFR est non-AA 68 01/22/2018 10:27 AM    Calcium 9.2 01/22/2018 10:27 AM      Lab Results   Component Value Date/Time    Sodium 142 01/22/2018 10:27 AM    Potassium 3.7 01/22/2018 10:27 AM    Chloride 101 01/22/2018 10:27 AM    CO2 25 01/22/2018 10:27 AM    Glucose 113 (H) 01/22/2018 10:27 AM    BUN 13 01/22/2018 10:27 AM    Creatinine 1.26 01/22/2018 10:27 AM    BUN/Creatinine ratio 10 01/22/2018 10:27 AM    GFR est AA 79 01/22/2018 10:27 AM    GFR est non-AA 68 01/22/2018 10:27 AM    Calcium 9.2 01/22/2018 10:27 AM    Bilirubin, total 0.4 01/22/2018 10:27 AM    ALT (SGPT) 28 01/22/2018 10:27 AM    AST (SGOT) 26 01/22/2018 10:27 AM    Alk. phosphatase 62 01/22/2018 10:27 AM    Protein, total 7.3 01/22/2018 10:27 AM    Albumin 4.5 01/22/2018 10:27 AM    A-G Ratio 1.6 01/22/2018 10:27 AM      No results found for: HBA1C, EHX9MKZT, HGBE8, NBV4LRHA, DWH1MUFD      No results for input(s): CPK, CKMB, TROIQ in the last 72 hours. No lab exists for component: CKQMB, CPKMB        Problem List:     Problem List  Date Reviewed: 10/1/2018    None            Eloy Burnham MD, [facility]

## 2018-10-01 NOTE — MR AVS SNAPSHOT
[address] [facility] 600 70 [facility] 
[phone] Patient: Torri Henry MRN: [id number][id number] Visit Information Date & Time Provider Department Dept. Phone Encounter #  
 10/1/2018  1:40 PM Debi Boothe MD [facility] [place] [phone] [id number] Follow-up Instructions Follow-up and Disposition History Your Appointments 10/8/2018  2:30 PM  
ROUTINE CARE with Dillon Padilla MD  
[address] [place] [address] [facility]) Appt Note: fv from 09/06/18  
 [address] [facility],[place] [address] 70 [facility]  
[phone]  
  
   
 [address] [place] [address],[facility] 3 70 [facility]  
  
    
 10/10/2018  4:00 PM  
ECHO CARDIOGRAMS 2D with BRYN AGUILA  
[facility] ([facility] Appt Note: per dr Ramin Pryor dx   Echo- LVH, Abnormal EKG  
 700 [facility] 600 70 [facility]  
[address] [facility], [place] [address] [address] Upcoming Health Maintenance Date Due Influenza Age 5 to Adult 8/1/2018 DTaP/Tdap/Td series (2 - Td) 8/20/2028 Allergies as of 10/1/2018  Review Complete On: 10/1/2018 By: Debi Boothe MD  
 No Known Allergies Current Immunizations  Never Reviewed Name Date Tdap 8/20/2018 Not reviewed this visit You Were Diagnosed With   
  
 Codes Comments LVH (left ventricular hypertrophy)    -  Primary ICD-10-CM: I51.7 ICD-9-CM: 429.3 Abnormal EKG     ICD-10-CM: R94.31 
ICD-9-CM: 794.31 Essential hypertension     ICD-10-CM: I10 
ICD-9-CM: 401.9 Vitals BP Pulse Resp Height(growth percentile) Weight(growth percentile) SpO2  
 140/78 (BP 1 Location: Left arm, BP Patient Position: Sitting) 68 16 6' 1\" (1.854 m) 235 lb (106.6 kg) 98% BMI Smoking Status 31 kg/m2 Never Smoker BMI and BSA Data Body Mass Index Body Surface Area  
 31 kg/m 2 2.34 m 2 Preferred Pharmacy Pharmacy Name Phone [id number] [address] [phone] Your Updated Medication List  
  
   
This list is accurate as of 10/1/18  2:22 PM.  Always use your most recent med list.  
  
  
  
  
 clotrimazole-betamethasone topical cream  
Commonly known as:  Rina Massa Apply to affected site on foot twice daily for 14 days  
  
 fluticasone 50 mcg/actuation nasal spray Commonly known as:  [place] 2 puffs each nostril at bedtime  
  
 lisinopril-hydroCHLOROthiazide 20-12.5 mg per tablet Commonly known as:  Humble Maksim Take 1 Tab by mouth daily. We Performed the Following AMB POC EKG ROUTINE W/ 12 LEADS, INTER & REP [99163 CPT(R)] To-Do List   
 10/01/2018 ECHO:  2D ECHO COMPLETE ADULT (TTE) W OR WO CONTR Patient Instructions Echo- LVH, Abnormal EKG Phone followup of the test results. Introducing [facility] & HEALTH SERVICES! 3 [facility] introduces [employer] patient portal. Now you can access parts of your medical record, email your doctor's office, and request medication refills online. 1. In your internet browser, go to [email or web address] 2. Click on the First Time User? Click Here link in the Sign In box. You will see the New Member Sign Up page. 3. Enter your Safe[employer] Access Code exactly as it appears below. You will not need to use this code after youve completed the sign-up process. If you do not sign up before the expiration date, you must request a new code. · [employer] Access Code: [email or web address] Expires: 10/28/2018  6:29 PM 
 
4. Enter the last four digits of your Social Security Number (xxxx) and Date of Birth (mm/dd/yyyy) as indicated and click Submit. You will be taken to the next sign-up page. 5. Create a SafeBoot ID.  This will be your SafeBoot login ID and cannot be changed, so think of one that is secure and easy to remember. 6. Create a [employer] password. You can change your password at any time. 7. Enter your Password Reset Question and Answer. This can be used at a later time if you forget your password. 8. Enter your e-mail address. You will receive e-mail notification when new information is available in [address] 9. Click Sign Up. You can now view and download portions of your medical record. 10. Click the Download Summary menu link to download a portable copy of your medical information. If you have questions, please visit the Frequently Asked Questions section of the [employer] website. Remember, [employer] is NOT to be used for urgent needs. For medical emergencies, dial 911. Now available from your iPhone and Android! Please provide this summary of care documentation to your next provider. Your primary care clinician is listed as Kris Marte. If you have any questions after today's visit, please call [phone].

## 2018-10-01 NOTE — PROGRESS NOTES
Chief Complaint   Patient presents with    Abnormal EKG    Hypertension     Elevated BP     Visit Vitals    /78 (BP 1 Location: Left arm, BP Patient Position: Sitting)    Pulse 68    Resp 16    Ht 6' 1\" (1.854 m)    Wt 235 lb (106.6 kg)    SpO2 98%    BMI 31 kg/m2     Pt presents in office without complaint. Patient recently at [facility] on 7/30/18 for HTN, LVH.

## 2018-10-10 ENCOUNTER — CLINICAL SUPPORT (OUTPATIENT)
Dept: CARDIOLOGY CLINIC | Age: 48
End: 2018-10-10

## 2018-10-10 DIAGNOSIS — I51.7 LVH (LEFT VENTRICULAR HYPERTROPHY): ICD-10-CM

## 2018-10-18 ENCOUNTER — OFFICE VISIT (OUTPATIENT)
Dept: FAMILY MEDICINE CLINIC | Age: 48
End: 2018-10-18

## 2018-10-18 VITALS
RESPIRATION RATE: 18 BRPM | HEART RATE: 54 BPM | TEMPERATURE: 98.6 F | BODY MASS INDEX: 31.14 KG/M2 | OXYGEN SATURATION: 97 % | WEIGHT: 235 LBS | HEIGHT: 73 IN | SYSTOLIC BLOOD PRESSURE: 158 MMHG | DIASTOLIC BLOOD PRESSURE: 94 MMHG

## 2018-10-18 DIAGNOSIS — E78.9 LIPID DISORDER: ICD-10-CM

## 2018-10-18 DIAGNOSIS — R05.8 COUGH DUE TO ACE INHIBITOR: ICD-10-CM

## 2018-10-18 DIAGNOSIS — T46.4X5A COUGH DUE TO ACE INHIBITOR: ICD-10-CM

## 2018-10-18 DIAGNOSIS — I10 ESSENTIAL HYPERTENSION: Primary | ICD-10-CM

## 2018-10-18 DIAGNOSIS — R73.09 ELEVATED GLUCOSE: ICD-10-CM

## 2018-10-18 DIAGNOSIS — L98.9 SKIN LESION: ICD-10-CM

## 2018-10-18 RX ORDER — LOSARTAN POTASSIUM 100 MG/1
100 TABLET ORAL DAILY
Qty: 90 TAB | Refills: 3 | Status: SHIPPED | OUTPATIENT
Start: 2018-10-18 | End: 2019-03-04 | Stop reason: ALTCHOICE

## 2018-10-18 NOTE — LETTER
10/18/2018 2:45 PM 
 
Mr. Torri Henry [address] [place] [address] Body mass index is 31 kg/m². Focus on regular exercise (150 minutes each week) and healthy eating. Eat more fruits and vegetables. Eat more protein (egg whites, beans, and nuts you know you tolerate) and less carbohydrates (white bread, white rice, white pasta, white potatoes, sodas, and sweets). Eat appropriately small portion sizes. See Dr. Shelley Mack for foot lesion. Call #[phone] for an appointment then call our referral coordinator with the date and time and the first and last name of who you will be seeing so we can authorize with your insurance. Sincerely, Apurva Robertson MD

## 2018-10-18 NOTE — PROGRESS NOTES
1. Have you been to the ER, urgent care clinic since your last visit? Hospitalized since your last visit? No 
 
2. Have you seen or consulted any other health care providers outside of the [address] [place] [address] since your last visit? Include any pap smears or colon screening. No 
 
Chief Complaint Patient presents with  Blood Pressure Check  Results EKG done at [facility] Patient declined flu vaccine. Blood pressure (!) 145/95, pulse (!) 55, temperature 98.6 °F (37 °C), temperature source Oral, resp. rate 18, height 6' 1\" (1.854 m), weight 235 lb (106.6 kg), SpO2 97 %.

## 2018-10-18 NOTE — PROGRESS NOTES
Paulette Carranza is a 57 y.o. male Issues discussed today include: 
 
BP check:  Still up. Also having cough on lisinopril. Will change to losartan 100 mg He still needs to see derm about foot Data reviewed or ordered today:  fasting labs soon Other problems include: There is no problem list on file for this patient. Medications: 
Current Outpatient Medications Medication Sig Dispense Refill  lisinopril-hydroCHLOROthiazide (PRINZIDE, ZESTORETIC) 20-12.5 mg per tablet Take 1 Tab by mouth daily. 30 Tab 2  clotrimazole-betamethasone (LOTRISONE) topical cream Apply to affected site on foot twice daily for 14 days 15 g 1  
 fluticasone (FLONASE) 50 mcg/actuation nasal spray 2 puffs each nostril at bedtime 1 Bottle 3 Allergies: 
No Known Allergies LMP:  No LMP for male patient. Social History Socioeconomic History  Marital status: SINGLE Spouse name: Not on file  Number of children: Not on file  Years of education: Not on file  Highest education level: Not on file Social Needs  Financial resource strain: Not on file  Food insecurity - worry: Not on file  Food insecurity - inability: Not on file  Transportation needs - medical: Not on file  Transportation needs - non-medical: Not on file Occupational History  Not on file Tobacco Use  Smoking status: Never Smoker  Smokeless tobacco: Never Used Substance and Sexual Activity  Alcohol use: Yes  Drug use: No  
 Sexual activity: Yes  
  Partners: Female Birth control/protection: Condom Other Topics Concern  Not on file Social History Narrative  Not on file Family History Problem Relation Age of Onset  Hypertension Mother  Diabetes Mother  Hypertension Father Meaningful use:  done ROS: 
Headaches:  no 
Chest Pain:  no 
SOB:  no 
Fevers:  no Falls:  no 
anxiety/depression/losing interest in doing things that were previously enjoyed:  no. PHQ2 = 0 Other significant ROS:  Still needs to see derm No LMP for male patient. Physical Exam 
Visit Vitals BP (!) 145/95 (BP 1 Location: Left arm, BP Patient Position: Sitting) Pulse (!) 55 Temp 98.6 °F (37 °C) (Oral) Resp 18 Ht 6' 1\" (1.854 m) Wt 235 lb (106.6 kg) SpO2 97% BMI 31.00 kg/m² BP Readings from Last 3 Encounters:  
10/18/18 (!) 145/95  
10/01/18 140/78  
09/06/18 135/86 Constitutional:  Appears well,  No Acute Distress, Vitals noted Psychiatric:   Affect normal, Alert and cooperative, Oriented to person/place/time Assessment:   
There is no problem list on file for this patient. Today's diagnoses are: ICD-10-CM ICD-9-CM 1. Essential hypertension I10 401.9 losartan (COZAAR) 100 mg tablet METABOLIC PANEL, BASIC AMB POC URINE, MICROALBUMIN, SEMIQUANT (3 RESULTS) 2. Cough due to ACE inhibitor R05 786.2 losartan (COZAAR) 100 mg tablet T46.4X5A E942.6 3. Lipid disorder E78.9 272.9 LIPID PANEL 4. Elevated glucose R73.09 790.29 HEMOGLOBIN A1C WITH EAG Plan: No orders of the defined types were placed in this encounter. See patient instructions There are no Patient Instructions on file for this visit. refresh note:  done AVS Printed:  done Diagnoses and all orders for this visit: 
 
Essential hypertension 
-     losartan (COZAAR) 100 mg tablet; Take 1 Tab by mouth daily. 
-     METABOLIC PANEL, BASIC 
-     AMB POC URINE, MICROALBUMIN, SEMIQUANT (3 RESULTS) Cough due to ACE inhibitor 
-     losartan (COZAAR) 100 mg tablet; Take 1 Tab by mouth daily. Lipid disorder -     LIPID PANEL Elevated glucose 
-     HEMOGLOBIN A1C WITH EAG Skin lesion See Dr. Reji Lane for foot skin lesion

## 2018-10-26 ENCOUNTER — TELEPHONE (OUTPATIENT)
Dept: CARDIOLOGY CLINIC | Age: 48
End: 2018-10-26

## 2018-10-26 NOTE — TELEPHONE ENCOUNTER
Call placed to pt in regard to echo. Left detailed VM at [phone] per pt preference. Informed pt of mostly normal results. Control BP d/t hypertensive heart disease.

## 2018-10-26 NOTE — TELEPHONE ENCOUNTER
----- Message from Sheeba Pappas MD sent at 10/23/2018  4:12 PM EDT -----  Pls notify>> Hypertensive heart disease changes. Control BP. Otherwise normal findings.

## 2018-11-20 ENCOUNTER — OFFICE VISIT (OUTPATIENT)
Dept: FAMILY MEDICINE CLINIC | Age: 48
End: 2018-11-20

## 2018-11-20 VITALS
TEMPERATURE: 98.4 F | RESPIRATION RATE: 16 BRPM | SYSTOLIC BLOOD PRESSURE: 149 MMHG | HEART RATE: 66 BPM | DIASTOLIC BLOOD PRESSURE: 98 MMHG | HEIGHT: 73 IN | OXYGEN SATURATION: 98 % | WEIGHT: 235 LBS | BODY MASS INDEX: 31.14 KG/M2

## 2018-11-20 DIAGNOSIS — B35.3 TINEA PEDIS OF LEFT FOOT: ICD-10-CM

## 2018-11-20 DIAGNOSIS — I10 ESSENTIAL HYPERTENSION: Primary | ICD-10-CM

## 2018-11-20 NOTE — PROGRESS NOTES
Subjective:      Trista Alas is a 57 y.o. male who presents for follow of hypertension and tinea pedis. He is currently on Losartan 100 mg daily. Tolerating medication well, has not noticed any side effects. He was initially on ACEi, which was discontinued due to cough. Does not bring in home log today. Diet - trying to follow a low salt diet   Exercise - Does exercise regularly. He is currently on Lotrisone topical cream for tinea infection. Admits he has not been consistently using it. Has noticed some improvement. Review of Systems   Constitutional: Negative for chills and fever. Respiratory: Negative for cough and shortness of breath. Cardiovascular: Negative for chest pain and palpitations. Neurological: Negative for dizziness and headaches. PMHx:  History reviewed. No pertinent past medical history. Meds:   Current Outpatient Medications   Medication Sig Dispense Refill    losartan (COZAAR) 100 mg tablet Take 1 Tab by mouth daily. 90 Tab 3    clotrimazole-betamethasone (LOTRISONE) topical cream Apply to affected site on foot twice daily for 14 days 15 g 1    fluticasone (FLONASE) 50 mcg/actuation nasal spray 2 puffs each nostril at bedtime 1 Bottle 3       Allergies:   No Known Allergies    Smoker:  Social History     Tobacco Use   Smoking Status Never Smoker   Smokeless Tobacco Never Used       ETOH:   Social History     Substance and Sexual Activity   Alcohol Use Yes       FH:   Family History   Problem Relation Age of Onset    Hypertension Mother     Diabetes Mother     Hypertension Father          Objective:     Visit Vitals  BP (!) 149/98   Pulse 66   Temp 98.4 °F (36.9 °C)   Resp 16   Ht 6' 1\" (1.854 m)   Wt 235 lb (106.6 kg)   SpO2 98%   BMI 31.00 kg/m²       Physical Examination:   GEN: No apparent distress. Alert and oriented and responds to all questions appropriately. Pleasant.      LUNGS: Respirations unlabored; clear to auscultation bilaterally  CARDIOVASCULAR: Regular, rate, and rhythm without murmurs, gallops or rubs   EXT: Well perfused. No edema. Tinea pedis between left 4th and 5th interdigital space. Assessment:       ICD-10-CM ICD-9-CM    1. Essential hypertension I10 401.9    2. Tinea pedis of left foot B35.3 110.4          Plan:     BP above goal today 149/98 on recheck   Instructed to keep daily log   Continue Losartan 100 mg daily   Counseled on DASH diet and exercise  Follow up in 2 weeks, if still above goal, consider adding CCB or diuretic to regimen   Continue Lotrisone BID for tinea pedis   Follow up in 2 weeks         Patient is counseled to return to the office if symptoms do not improve as expected. Urgent consultation with the nearest Emergency Department is strongly recommended if condition worsens. Patient is counseled to follow up as recommended and to inform the office if any changes in treatment are recommended.             Signed By:  Sophia Meeks MD    Family Medicine Resident

## 2018-11-21 NOTE — PATIENT INSTRUCTIONS
[facility]  [facility]  [address]  Phone: [phone]  Fax: [phone]        Marly Liang, Podiatrist  [facility] [address] [place], [address]  [address] [facility] [address] [facility] [address] [facility]    Dr Ani Case 1921 [address]

## 2018-11-21 NOTE — PROGRESS NOTES
Chief Complaint   Patient presents with    Toe Pain     pinky toe left foot     1. Have you been to the ER, urgent care clinic since your last visit? Hospitalized since your last visit? No    2. Have you seen or consulted any other health care providers outside of the [address] [place] [address] since your last visit? Include any pap smears or colon screening.  No

## 2018-12-05 NOTE — PROGRESS NOTES
Subjective:      Valeria Barrientos is a 57 y.o. male who presents for follow up of hypertension. He is currently on Losartan 100 mg daily. Taking medication as scheduled, has not missed any doses. Has not noticed any side effects to the medication. Does not blood pressure at home. Diet- does try and follow a low salt diet   Exercise -Has not been exercising regularly   Denies any chest pain, palpitations, headaches or dizziness. Declines flu shot today     Review of Systems   Constitutional: Negative for chills and fever. Respiratory: Negative for cough and shortness of breath. Cardiovascular: Negative for chest pain and palpitations. Neurological: Negative for dizziness and headaches. PMHx:  History reviewed. No pertinent past medical history. Meds:   Current Outpatient Medications   Medication Sig Dispense Refill    hydroCHLOROthiazide (HYDRODIURIL) 12.5 mg tablet Take 1 Tab by mouth daily. 90 Tab 0    Blood Pressure Test Kit-Large (QUICK RESPONSE BP MONITOR) kit 1 Kit by Does Not Apply route daily. 1 Kit 0    losartan (COZAAR) 100 mg tablet Take 1 Tab by mouth daily.  90 Tab 3    clotrimazole-betamethasone (LOTRISONE) topical cream Apply to affected site on foot twice daily for 14 days 15 g 1    fluticasone (FLONASE) 50 mcg/actuation nasal spray 2 puffs each nostril at bedtime 1 Bottle 3       Allergies:   No Known Allergies    Smoker:  Social History     Tobacco Use   Smoking Status Never Smoker   Smokeless Tobacco Never Used       ETOH:   Social History     Substance and Sexual Activity   Alcohol Use Yes       FH:   Family History   Problem Relation Age of Onset    Hypertension Mother     Diabetes Mother     Hypertension Father          Objective:     Visit Vitals  BP (!) 153/93   Pulse 77   Temp 97.9 °F (36.6 °C) (Oral)   Resp 18   Ht 6' 1\" (1.854 m)   Wt 239 lb 12.8 oz (108.8 kg)   SpO2 99%   BMI 31.64 kg/m²       Physical Exam   Constitutional: He is oriented to person, place, and time and well-developed, well-nourished, and in no distress. No distress. HENT:   Head: Normocephalic and atraumatic. Cardiovascular: Normal rate, regular rhythm and normal heart sounds. Pulmonary/Chest: Effort normal and breath sounds normal. No respiratory distress. He has no wheezes. Neurological: He is alert and oriented to person, place, and time. Gait normal.   Skin: He is not diaphoretic. Assessment:       ICD-10-CM ICD-9-CM    1. Essential hypertension I10 401.9 hydroCHLOROthiazide (HYDRODIURIL) 12.5 mg tablet      Blood Pressure Test Kit-Large (QUICK RESPONSE BP MONITOR) kit      AMB POC URINE, MICROALBUMIN, SEMIQUANT (3 RESULTS)   2. Influenza vaccination declined Z28.21 V64.06          Plan:     - BP above goal today as well as on prior visits   - Start Hydrochlorothiazide 12.5mg daily to current regimen, counseled on side effects of medication    - Continue Losartan 100 mg daily   - Instructed to keep daily BP log   - Counseled on DASH diet and exercise   - Follow up in 2 weeks for BP check    Patient is counseled to return to the office if symptoms do not improve as expected. Urgent consultation with the nearest Emergency Department is strongly recommended if condition worsens. Patient is counseled to follow up as recommended and to inform the office if any changes in treatment are recommended.         Signed By:  Eileen Andersen MD    Family Medicine Resident

## 2018-12-06 ENCOUNTER — OFFICE VISIT (OUTPATIENT)
Dept: FAMILY MEDICINE CLINIC | Age: 48
End: 2018-12-06

## 2018-12-06 VITALS
SYSTOLIC BLOOD PRESSURE: 153 MMHG | TEMPERATURE: 97.9 F | DIASTOLIC BLOOD PRESSURE: 93 MMHG | WEIGHT: 239.8 LBS | RESPIRATION RATE: 18 BRPM | HEIGHT: 73 IN | BODY MASS INDEX: 31.78 KG/M2 | HEART RATE: 77 BPM | OXYGEN SATURATION: 99 %

## 2018-12-06 DIAGNOSIS — Z28.21 INFLUENZA VACCINATION DECLINED: ICD-10-CM

## 2018-12-06 DIAGNOSIS — I10 ESSENTIAL HYPERTENSION: Primary | ICD-10-CM

## 2018-12-06 LAB
ALBUMIN UR QL STRIP: 80 MG/L
CREATININE, URINE POC: 300 MG/DL
MICROALBUMIN/CREAT RATIO POC: NORMAL MG/G

## 2018-12-06 RX ORDER — ACETAMINOPHEN 500 MG
1 TABLET ORAL DAILY
Qty: 1 KIT | Refills: 0 | Status: SHIPPED | OUTPATIENT
Start: 2018-12-06

## 2018-12-06 RX ORDER — HYDROCHLOROTHIAZIDE 12.5 MG/1
12.5 TABLET ORAL DAILY
Qty: 90 TAB | Refills: 0 | Status: SHIPPED | OUTPATIENT
Start: 2018-12-06 | End: 2019-03-04 | Stop reason: ALTCHOICE

## 2018-12-06 NOTE — PROGRESS NOTES
Adamaris Del Angel is a 57 y.o. male  Chief Complaint   Patient presents with    Follow-up     Visit Vitals  /79 (BP 1 Location: Left arm, BP Patient Position: Sitting)   Pulse 71   Temp 97.9 °F (36.6 °C) (Oral)   Resp 18   Ht 6' 1\" (1.854 m)   Wt 239 lb 12.8 oz (108.8 kg)   SpO2 99%   BMI 31.64 kg/m²     1. Have you been to the ER, urgent care clinic since your last visit? Hospitalized since your last visit? No    2. Have you seen or consulted any other health care providers outside of the [address] [place] [address] since your last visit? Include any pap smears or colon screening.  No  Health Maintenance Due   Topic Date Due    Influenza Age 5 to Adult  08/01/2018

## 2018-12-07 LAB
BUN SERPL-MCNC: 16 MG/DL (ref 6–24)
BUN/CREAT SERPL: 14 (ref 9–20)
CALCIUM SERPL-MCNC: 9.5 MG/DL (ref 8.7–10.2)
CHLORIDE SERPL-SCNC: 103 MMOL/L (ref 96–106)
CHOLEST SERPL-MCNC: 172 MG/DL (ref 100–199)
CO2 SERPL-SCNC: 26 MMOL/L (ref 20–29)
CREAT SERPL-MCNC: 1.12 MG/DL (ref 0.76–1.27)
EST. AVERAGE GLUCOSE BLD GHB EST-MCNC: 114 MG/DL
GLUCOSE SERPL-MCNC: 104 MG/DL (ref 65–99)
HBA1C MFR BLD: 5.6 % (ref 4.8–5.6)
HDLC SERPL-MCNC: 38 MG/DL
INTERPRETATION, 910389: NORMAL
LDLC SERPL CALC-MCNC: 81 MG/DL (ref 0–99)
POTASSIUM SERPL-SCNC: 3.8 MMOL/L (ref 3.5–5.2)
SODIUM SERPL-SCNC: 144 MMOL/L (ref 134–144)
TRIGL SERPL-MCNC: 266 MG/DL (ref 0–149)
VLDLC SERPL CALC-MCNC: 53 MG/DL (ref 5–40)

## 2018-12-09 DIAGNOSIS — E78.2 ELEVATED CHOLESTEROL WITH ELEVATED TRIGLYCERIDES: ICD-10-CM

## 2018-12-09 DIAGNOSIS — R73.09 ELEVATED GLUCOSE: Primary | ICD-10-CM

## 2018-12-09 PROBLEM — E78.9 LIPID DISORDER: Status: ACTIVE | Noted: 2018-12-09

## 2018-12-09 PROBLEM — I10 ESSENTIAL HYPERTENSION: Status: ACTIVE | Noted: 2018-12-09

## 2019-03-04 ENCOUNTER — OFFICE VISIT (OUTPATIENT)
Dept: FAMILY MEDICINE CLINIC | Age: 49
End: 2019-03-04

## 2019-03-04 VITALS
SYSTOLIC BLOOD PRESSURE: 172 MMHG | WEIGHT: 246.8 LBS | TEMPERATURE: 98.6 F | RESPIRATION RATE: 18 BRPM | OXYGEN SATURATION: 96 % | DIASTOLIC BLOOD PRESSURE: 102 MMHG | HEART RATE: 67 BPM | BODY MASS INDEX: 32.71 KG/M2 | HEIGHT: 73 IN

## 2019-03-04 DIAGNOSIS — H53.8 BLURRY VISION: ICD-10-CM

## 2019-03-04 DIAGNOSIS — I10 ESSENTIAL HYPERTENSION: Primary | ICD-10-CM

## 2019-03-04 RX ORDER — LOSARTAN POTASSIUM AND HYDROCHLOROTHIAZIDE 25; 100 MG/1; MG/1
1 TABLET ORAL DAILY
Qty: 30 TAB | Refills: 11 | Status: SHIPPED | OUTPATIENT
Start: 2019-03-04 | End: 2021-10-19

## 2019-03-04 NOTE — PROGRESS NOTES
Chief Complaint Patient presents with  Medication Evaluation  
  bp med and HTN. right leg pulled muscle as well Reviewed the chart and obtain necessary information for visit. 1. Have you been to the ER, urgent care clinic since your last visit? Hospitalized since your last visit? No 
 
2. Have you seen or consulted any other health care providers outside of the [address] [place] [address] since your last visit? Include any pap smears or colon screening.  No

## 2019-03-04 NOTE — PATIENT INSTRUCTIONS
DASH Diet: Care Instructions Your Care Instructions The DASH diet is an eating plan that can help lower your blood pressure. DASH stands for Dietary Approaches to Stop Hypertension. Hypertension is high blood pressure. The DASH diet focuses on eating foods that are high in calcium, potassium, and magnesium. These nutrients can lower blood pressure. The foods that are highest in these nutrients are fruits, vegetables, low-fat dairy products, nuts, seeds, and legumes. But taking calcium, potassium, and magnesium supplements instead of eating foods that are high in those nutrients does not have the same effect. The DASH diet also includes whole grains, fish, and poultry. The DASH diet is one of several lifestyle changes your doctor may recommend to lower your high blood pressure. Your doctor may also want you to decrease the amount of sodium in your diet. Lowering sodium while following the DASH diet can lower blood pressure even further than just the DASH diet alone. Follow-up care is a key part of your treatment and safety. Be sure to make and go to all appointments, and call your doctor if you are having problems. It's also a good idea to know your test results and keep a list of the medicines you take. How can you care for yourself at home? Following the DASH diet · Eat 4 to 5 servings of fruit each day. A serving is 1 medium-sized piece of fruit, ½ cup chopped or canned fruit, 1/4 cup dried fruit, or 4 ounces (½ cup) of fruit juice. Choose fruit more often than fruit juice. · Eat 4 to 5 servings of vegetables each day. A serving is 1 cup of lettuce or raw leafy vegetables, ½ cup of chopped or cooked vegetables, or 4 ounces (½ cup) of vegetable juice. Choose vegetables more often than vegetable juice. · Get 2 to 3 servings of low-fat and fat-free dairy each day. A serving is 8 ounces of milk, 1 cup of yogurt, or 1 ½ ounces of cheese. · Eat 6 to 8 servings of grains each day. A serving is 1 slice of bread, 1 ounce of dry cereal, or ½ cup of cooked rice, pasta, or cooked cereal. Try to choose whole-grain products as much as possible. · Limit lean meat, poultry, and fish to 2 servings each day. A serving is 3 ounces, about the size of a deck of cards. · Eat 4 to 5 servings of nuts, seeds, and legumes (cooked dried beans, lentils, and split peas) each week. A serving is 1/3 cup of nuts, 2 tablespoons of seeds, or ½ cup of cooked beans or peas. · Limit fats and oils to 2 to 3 servings each day. A serving is 1 teaspoon of vegetable oil or 2 tablespoons of salad dressing. · Limit sweets and added sugars to 5 servings or less a week. A serving is 1 tablespoon jelly or jam, ½ cup sorbet, or 1 cup of lemonade. · Eat less than 2,300 milligrams (mg) of sodium a day. If you limit your sodium to 1,500 mg a day, you can lower your blood pressure even more. Tips for success · Start small. Do not try to make dramatic changes to your diet all at once. You might feel that you are missing out on your favorite foods and then be more likely to not follow the plan. Make small changes, and stick with them. Once those changes become habit, add a few more changes. · Try some of the following: ? Make it a goal to eat a fruit or vegetable at every meal and at snacks. This will make it easy to get the recommended amount of fruits and vegetables each day. ? Try yogurt topped with fruit and nuts for a snack or healthy dessert. ? Add lettuce, tomato, cucumber, and onion to sandwiches. ? Combine a ready-made pizza crust with low-fat mozzarella cheese and lots of vegetable toppings. Try using tomatoes, squash, spinach, broccoli, carrots, cauliflower, and onions. ? Have a variety of cut-up vegetables with a low-fat dip as an appetizer instead of chips and dip. ? Sprinkle sunflower seeds or chopped almonds over salads.  Or try adding chopped walnuts or almonds to cooked vegetables. ? Try some vegetarian meals using beans and peas. Add garbanzo or kidney beans to salads. Make burritos and tacos with mashed العر[place] beans or black beans. Where can you learn more? Go to http://omar-cornelius.info/. Enter U360 in the search box to learn more about \"DASH Diet: Care Instructions. \" Current as of: July 22, 2018 Content Version: 11.9 © 9805-3806 [employer]. Care instructions adapted under license by "[employer] (which disclaims liability or warranty for this information). If you have questions about a medical condition or this instruction, always ask your healthcare professional. [address] any warranty or liability for your use of this information.

## 2019-03-04 NOTE — PROGRESS NOTES
Subjective Ronnie Maria is an 57 y.o. male who presents for: HTN follow up. Taking BP med since 9/2018. Takes Losartan 100mg daily and  HCTZ 12.5mg recently added. At home 140-160's/'s. Blurry vision for 8 months, no HA, SOB, CP, swelling. Dx with LVH and saw cardiology in the past.   
 
Allergies - reviewed:  
No Known Allergies Medications - reviewed:  
Current Outpatient Medications Medication Sig  
 losartan-hydroCHLOROthiazide (HYZAAR) 100-25 mg per tablet Take 1 Tab by mouth daily.  Blood Pressure Test Kit-Large (QUICK RESPONSE BP MONITOR) kit 1 Kit by Does Not Apply route daily.  clotrimazole-betamethasone (LOTRISONE) topical cream Apply to affected site on foot twice daily for 14 days  fluticasone (FLONASE) 50 mcg/actuation nasal spray 2 puffs each nostril at bedtime No current facility-administered medications for this visit. Past Medical History - reviewed: 
History reviewed. No pertinent past medical history. Past Surgical History - reviewed:  
History reviewed. No pertinent surgical history. Social History - reviewed: 
Social History Socioeconomic History  Marital status: SINGLE Spouse name: Not on file  Number of children: Not on file  Years of education: Not on file  Highest education level: Not on file Social Needs  Financial resource strain: Not on file  Food insecurity - worry: Not on file  Food insecurity - inability: Not on file  Transportation needs - medical: Not on file  Transportation needs - non-medical: Not on file Occupational History  Not on file Tobacco Use  Smoking status: Never Smoker  Smokeless tobacco: Never Used Substance and Sexual Activity  Alcohol use: Yes  Drug use: No  
 Sexual activity: Yes  
  Partners: Female Birth control/protection: Condom Other Topics Concern  Not on file Social History Narrative  Not on file Family History - reviewed: 
Family History Problem Relation Age of Onset  Hypertension Mother  Diabetes Mother  Hypertension Father ROS 
CONSTITUTIONAL: Denies: fever, chills CV: no cp, sob, palpitations Physical Exam 
Visit Vitals BP (!) 172/102 (BP 1 Location: Left arm, BP Patient Position: Sitting) Pulse 67 Temp 98.6 °F (37 °C) (Oral) Resp 18 Ht 6' 1\" (1.854 m) Wt 246 lb 12.8 oz (111.9 kg) SpO2 96% BMI 32.56 kg/m² General appearance - alert, well appearing, and in no distress Eyes - pupils equal and reactive, extraocular eye movements intact, sclera anicteric, pupils small, could not see vessels well Neck - supple, no significant adenopathy Chest - clear to auscultation, no wheezes, rales or rhonchi, symmetric air entry Heart - normal rate, regular rhythm, normal S1, S2, +S4 Neurological - alert, oriented, normal speech, no focal findings or movement disorder noted Extremities - no pedal edema noted Psych - normal mood and affect Assessment/Plan ICD-10-CM ICD-9-CM 1. Essential hypertension I10 401.9 losartan-hydroCHLOROthiazide (HYZAAR) 100-25 mg per tablet 2. Blurry vision H53.8 368.8 REFERRAL TO OPHTHALMOLOGY · BP up, hasn't taken Rx today, but at home has been running high too. So will increase HCTZ to 25mg and put on combo pill with Losartan · Will call pt in 1 week to review log. · Labs at next visit. · Referral to eye doctor given. I have discussed the diagnosis with the patient and the intended plan as seen in the above orders. The patient has received an after-visit summary and questions were answered concerning future plans. I have discussed medication side effects and warnings with the patient as well.  
 
 
Hadley Girard, DO

## 2019-03-13 ENCOUNTER — DOCUMENTATION ONLY (OUTPATIENT)
Dept: FAMILY MEDICINE CLINIC | Age: 49
End: 2019-03-13

## 2019-03-13 ENCOUNTER — TELEPHONE (OUTPATIENT)
Dept: FAMILY MEDICINE CLINIC | Age: 49
End: 2019-03-13

## 2019-03-13 NOTE — TELEPHONE ENCOUNTER
Called and spoke with patient, two identifiers verified. Patient states his blood pressure has been 140's -150's over 70s-80's with one reading over 92. Patient states he does not have blurred vision but is now scheduled to see eye doctor. Patient requested two week follow up and is scheduled to see Dr. Nell Berman on 3/18/19 at 4pm. Patient denied any other concerns at this time.

## 2019-03-18 ENCOUNTER — OFFICE VISIT (OUTPATIENT)
Dept: FAMILY MEDICINE CLINIC | Age: 49
End: 2019-03-18

## 2019-03-18 VITALS
TEMPERATURE: 97.8 F | HEART RATE: 68 BPM | BODY MASS INDEX: 32.34 KG/M2 | HEIGHT: 73 IN | SYSTOLIC BLOOD PRESSURE: 142 MMHG | OXYGEN SATURATION: 98 % | DIASTOLIC BLOOD PRESSURE: 82 MMHG | WEIGHT: 244 LBS

## 2019-03-18 DIAGNOSIS — J40 BRONCHITIS: Primary | ICD-10-CM

## 2019-03-18 RX ORDER — BENZONATATE 100 MG/1
100 CAPSULE ORAL
Qty: 20 CAP | Refills: 0 | Status: SHIPPED | OUTPATIENT
Start: 2019-03-18 | End: 2019-03-25

## 2019-03-18 NOTE — PROGRESS NOTES
Subjective  Carlos Peterson is an 57 y.o. male who presents for dry cough. Pt notes that he developed URI 3 weeks ago. Overall improved. Continues to have dry cough. No sinus tenderness, fatigue, body aches, SOB. Has been able to hydrate and eat at baseline. Cough is worse at night. No sick contacts. No tx attempted. Allergies - reviewed:   No Known Allergies      Medications - reviewed:   Current Outpatient Medications   Medication Sig    benzonatate (TESSALON) 100 mg capsule Take 1 Cap by mouth three (3) times daily as needed for Cough for up to 7 days.  losartan-hydroCHLOROthiazide (HYZAAR) 100-25 mg per tablet Take 1 Tab by mouth daily.  Blood Pressure Test Kit-Large (QUICK RESPONSE BP MONITOR) kit 1 Kit by Does Not Apply route daily.  clotrimazole-betamethasone (LOTRISONE) topical cream Apply to affected site on foot twice daily for 14 days    fluticasone (FLONASE) 50 mcg/actuation nasal spray 2 puffs each nostril at bedtime     No current facility-administered medications for this visit. Social History - reviewed:  Social History     Socioeconomic History    Marital status: SINGLE     Spouse name: Not on file    Number of children: Not on file    Years of education: Not on file    Highest education level: Not on file   Social Needs    Financial resource strain: Not on file    Food insecurity - worry: Not on file    Food insecurity - inability: Not on file   [employer] needs - medical: Not on file   [employer] needs - non-medical: Not on file   Occupational History    Not on file   Tobacco Use    Smoking status: Never Smoker    Smokeless tobacco: Never Used   Substance and Sexual Activity    Alcohol use:  Yes    Drug use: No    Sexual activity: Yes     Partners: Female     Birth control/protection: Condom   Other Topics Concern    Not on file   Social History Narrative    Not on file     ROS  No fever or chills  No SOB or wheezing  No CP or edema  No N/V/D    Physical Exam  Visit Vitals  /82   Pulse 68   Temp 97.8 °F (36.6 °C)   Ht 6' 1\" (1.854 m)   Wt 244 lb (110.7 kg)   SpO2 98%   BMI 32.19 kg/m²       General appearance - alert, well appearing, and in no distress  Eyes - pupils equal and reactive, extraocular eye movements intact  Ears - bilateral TM's and external ear canals normal  Nose - normal nontender sinuses and mucosal congestion  Mouth - mucous membranes moist, postnasal drip  Neck - supple, no significant adenopathy  Chest - clear to auscultation, no wheezes, rales or rhonchi, symmetric air entry  Heart - normal rate, regular rhythm, normal S1, S2, no murmurs, rubs, clicks or gallops      Assessment/Plan    ICD-10-CM ICD-9-CM    1. Bronchitis J40 490      VSS. PE reassuring  Supportive care discussed  Will prescribe tessalon pearls  All questions answered  Precautions given    Follow-up Disposition:  Return if symptoms worsen or fail to improve. I have discussed the diagnosis with the patient and the intended plan as seen in the above orders. Patient verbalized understanding of the plan and agrees with the plan. The patient has received an after-visit summary and questions were answered concerning future plans. I have discussed medication side effects and warnings with the patient as well. Informed patient to return to the office if new symptoms arise.         Theodore Mendoza DO  Family Medicine Resident

## 2019-03-18 NOTE — PATIENT INSTRUCTIONS
Bronchitis: Care Instructions  Your Care Instructions    Bronchitis is inflammation of the bronchial tubes, which carry air to the lungs. The tubes swell and produce mucus, or phlegm. The mucus and inflamed bronchial tubes make you cough. You may have trouble breathing. Most cases of bronchitis are caused by viruses like those that cause colds. Antibiotics usually do not help and they may be harmful. Bronchitis usually develops rapidly and lasts about 2 to 3 weeks in otherwise healthy people. Follow-up care is a key part of your treatment and safety. Be sure to make and go to all appointments, and call your doctor if you are having problems. It's also a good idea to know your test results and keep a list of the medicines you take. How can you care for yourself at home? · Take all medicines exactly as prescribed. Call your doctor if you think you are having a problem with your medicine. · Get some extra rest.  · Take an over-the-counter pain medicine, such as acetaminophen (Tylenol), ibuprofen (Advil, Motrin), or naproxen (Aleve) to reduce fever and relieve body aches. Read and follow all instructions on the label. · Do not take two or more pain medicines at the same time unless the doctor told you to. Many pain medicines have acetaminophen, which is Tylenol. Too much acetaminophen (Tylenol) can be harmful. · Take an over-the-counter cough medicine that contains dextromethorphan to help quiet a dry, hacking cough so that you can sleep. Avoid cough medicines that have more than one active ingredient. Read and follow all instructions on the label. · Breathe moist air from a humidifier, hot shower, or sink filled with hot water. The heat and moisture will thin mucus so you can cough it out. · Do not smoke. Smoking can make bronchitis worse. If you need help quitting, talk to your doctor about stop-smoking programs and medicines. These can increase your chances of quitting for good.   When should you call for help? Call 911 anytime you think you may need emergency care. For example, call if:    · You have severe trouble breathing.    Call your doctor now or seek immediate medical care if:    · You have new or worse trouble breathing.     · You cough up dark brown or bloody mucus (sputum).     · You have a new or higher fever.     · You have a new rash.    Watch closely for changes in your health, and be sure to contact your doctor if:    · You cough more deeply or more often, especially if you notice more mucus or a change in the color of your mucus.     · You are not getting better as expected. Where can you learn more? Go to http://omar-cornelius.info/. Enter H333 in the search box to learn more about \"Bronchitis: Care Instructions. \"  Current as of: September 5, 2018  Content Version: 11.9  © 1118-2856 [employer]. Care instructions adapted under license by [employer] (which disclaims liability or warranty for this information). If you have questions about a medical condition or this instruction, always ask your healthcare professional. [address] any warranty or liability for your use of this information.

## 2019-05-09 DIAGNOSIS — Z91.09 ENVIRONMENTAL ALLERGIES: ICD-10-CM

## 2019-05-10 RX ORDER — FLUTICASONE PROPIONATE 50 MCG
SPRAY, SUSPENSION (ML) NASAL
Qty: 1 BOTTLE | Refills: 3 | OUTPATIENT
Start: 2019-05-10

## 2019-05-10 NOTE — TELEPHONE ENCOUNTER
Pharmacy called for a refill stating the script they have is in Dr. Ania Castle name. Are you willing to refill?

## 2019-07-19 NOTE — TELEPHONE ENCOUNTER
Did she mention why he was waking up? If he said it's bc his belly was hurting, then yes, I would try zantac before dinner and go ahead and make follow up appt with [facility] Never seen patient. Last seen by Dr Venita Heart.

## 2019-07-24 ENCOUNTER — OFFICE VISIT (OUTPATIENT)
Dept: FAMILY MEDICINE CLINIC | Age: 49
End: 2019-07-24

## 2019-07-24 VITALS
SYSTOLIC BLOOD PRESSURE: 124 MMHG | TEMPERATURE: 98.5 F | HEIGHT: 73 IN | BODY MASS INDEX: 32.39 KG/M2 | HEART RATE: 68 BPM | WEIGHT: 244.4 LBS | OXYGEN SATURATION: 96 % | DIASTOLIC BLOOD PRESSURE: 74 MMHG | RESPIRATION RATE: 16 BRPM

## 2019-07-24 DIAGNOSIS — R20.2 LEFT HAND PARESTHESIA: Primary | ICD-10-CM

## 2019-07-24 DIAGNOSIS — M54.50 LOW BACK PAIN WITHOUT SCIATICA, UNSPECIFIED BACK PAIN LATERALITY, UNSPECIFIED CHRONICITY: ICD-10-CM

## 2019-07-24 NOTE — PROGRESS NOTES
Parisa Friedman is a 54 y.o. male with history of HTN, HLD  CC: left hand tingling and low back pain  History provided by patient     Subjective:     HPI:    Patient presents to clinic today complaining of tingling on left fingertips. He endorses tinging sensation on all left fingertips x ~3 weeks(no finger sparing). He does not recall trauma, fall or lifting heavy objects at time of onset. 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 Denies using any OTC supplements or illicit drugs. Moreover patient endorses low back pain x ~6 weeks. He does not recall trauma, although he declares \"I'm always doing something around the house though. \" No fever, chills, night sweats, urinary or bowel incontinence/retention, N/V, or dysuria. Pain has been alleviated by Tylenol, and not significant at the moment. No other acute complaint at this visit. Current Outpatient Medications on File Prior to Visit   Medication Sig Dispense Refill    losartan-hydroCHLOROthiazide (HYZAAR) 100-25 mg per tablet Take 1 Tab by mouth daily. 30 Tab 11    Blood Pressure Test Kit-Large (QUICK RESPONSE BP MONITOR) kit 1 Kit by Does Not Apply route daily. 1 Kit 0    clotrimazole-betamethasone (LOTRISONE) topical cream Apply to affected site on foot twice daily for 14 days 15 g 1    fluticasone (FLONASE) 50 mcg/actuation nasal spray 2 puffs each nostril at bedtime 1 Bottle 3     No current facility-administered medications on file prior to visit.         Patient Active Problem List   Diagnosis Code    Lipid disorder E78.9    Essential hypertension I10    Elevated glucose R73.09       Social History     Socioeconomic History    Marital status: SINGLE     Spouse name: Not on file    Number of children: Not on file    Years of education: Not on file    Highest education level: Not on file   Occupational History    Not on file   Social Needs    Financial resource strain: Not on file   [facility] Food insecurity:     Worry: Not on file     Inability: Not on file    Transportation needs:     Medical: Not on file     Non-medical: Not on file   Tobacco Use    Smoking status: Never Smoker    Smokeless tobacco: Never Used   Substance and Sexual Activity    Alcohol use: Yes    Drug use: No    Sexual activity: Yes     Partners: Female     Birth control/protection: Condom   Lifestyle    Physical activity:     Days per week: Not on file     Minutes per session: Not on file    Stress: Not on file   Relationships    Social connections:     Talks on phone: Not on file     Gets together: Not on file     Attends [employer] service: Not on file     Active member of club or organization: Not on file     Attends meetings of clubs or organizations: Not on file     Relationship status: Not on file    Intimate partner violence:     Fear of current or ex partner: Not on file     Emotionally abused: Not on file     Physically abused: Not on file     Forced sexual activity: Not on file   Other Topics Concern    Not on file   Social History Narrative    Not on file       Review of Systems   Constitutional: Negative for chills and fever. HENT: Negative for congestion. Eyes: Negative for blurred vision. Respiratory: Negative for cough. Cardiovascular: Negative for chest pain and palpitations. Gastrointestinal: Negative for abdominal pain, nausea and vomiting. Genitourinary: Negative for dysuria. Musculoskeletal: Positive for back pain. Negative for falls, myalgias and neck pain. Skin: Negative for rash. Neurological: Negative for dizziness and headaches. +tingling of left fingertips   Endo/Heme/Allergies: Negative for polydipsia. Psychiatric/Behavioral: Negative for substance abuse.          Objective:     Visit Vitals  /74 (BP 1 Location: Right arm, BP Patient Position: Sitting)   Pulse 68   Temp 98.5 °F (36.9 °C) (Oral)   Resp 16   Ht 6' 1\" (1.854 m)   Wt 244 lb 6.4 oz (110.9 kg)   SpO2 96%   BMI 32.24 kg/m²        Physical Exam   Constitutional: He is oriented to person, place, and time. He appears well-developed and well-nourished. No distress. HENT:   Head: Normocephalic and atraumatic. Right Ear: External ear normal.   Left Ear: External ear normal.   Eyes: Conjunctivae and EOM are normal.   Neck: Normal range of motion. Neck supple. Cardiovascular: Normal rate, regular rhythm and normal heart sounds. Pulmonary/Chest: Effort normal and breath sounds normal. No respiratory distress. Abdominal: Soft. Bowel sounds are normal. He exhibits no distension. There is no tenderness. Musculoskeletal: Normal range of motion. He exhibits no edema. No wound or foreign body noted on b/l hand. Strength 5/5 and reflexes intact on b/l LE. No tenderness on palpation of back, normal flexion and extension of b/l LE. Neurological: He is alert and oriented to person, place, and time. Coordination normal.   Skin: Skin is warm. No rash noted. Psychiatric: He has a normal mood and affect. His behavior is normal. Thought content normal.         Assessment and orders:     Pt is 57yro M who presents to clinic for evaluation of tingling on left fingertips. ICD-10-CM ICD-9-CM    1. Left hand paresthesia R20.2 782.0 MAGNESIUM      CBC WITH AUTOMATED DIFF      METABOLIC PANEL, COMPREHENSIVE      VITAMIN B6      VITAMIN B12   2. Low back pain without sciatica, unspecified back pain laterality, unspecified chronicity M54.5 724.2      Diagnoses and all orders for this visit:    1. Left hand paresthesia  Unusual picture as all five fingertips with tingling sensation. Will f/u on labwork to r/o anemia, vitamin or electrolyte deficiency. Also advised on trial of wrist splint for a couple of weeks in the meantime. In case labwork unremarkable and Sx still present, consider imaging and referral to neurology for EMG studies.    -     MAGNESIUM  -     CBC WITH AUTOMATED DIFF  -     METABOLIC PANEL, COMPREHENSIVE  - VITAMIN B6  -     VITAMIN B12  -     TSH    2. Low back pain without sciatica, unspecified back pain laterality, unspecified chronicity  For acute pain, rest, intermittent application of cold packs (later, may switch to heat, but do not sleep on heating pad), and analgesics are recommended. Discussed longer term treatment plan of prn NSAID's and discussed a home back care exercise program with flexion exercise routine. Proper lifting with avoidance of heavy lifting discussed. Consider Physical Therapy and XRay studies if not improving. Call or return to clinic prn if these symptoms worsen or fail to improve as anticipated. Follow-up and Dispositions    · Return in about 2 weeks (around 8/7/2019) for follow-up. I have reviewed patient medical and social history and medications. I have reviewed pertinent labs results and other data. I have discussed the diagnosis with the patient and the intended plan as seen in the above orders. The patient has received an after-visit summary and questions were answered concerning future plans. I have discussed medication side effects and warnings with the patient as well.     Deep Little MD  Resident [facility] [employer] [facility]  07/24/19

## 2019-07-24 NOTE — PROGRESS NOTES
Chief Complaint   Patient presents with    LOW BACK PAIN     2-3 weeks per pt. Bilateral flank pain.  Tingling     Left hand tingling and numbness for 2-3 weeks. 1. Have you been to the ER, urgent care clinic since your last visit? Hospitalized since your last visit? No    2. Have you seen or consulted any other health care providers outside of the [address] [place] [address] since your last visit? Include any pap smears or colon screening.   No

## 2019-07-26 ENCOUNTER — LAB ONLY (OUTPATIENT)
Dept: FAMILY MEDICINE CLINIC | Age: 49
End: 2019-07-26

## 2019-07-29 LAB
ALBUMIN SERPL-MCNC: 4.9 G/DL (ref 3.5–5.5)
ALBUMIN/GLOB SERPL: 2 {RATIO} (ref 1.2–2.2)
ALP SERPL-CCNC: 54 IU/L (ref 39–117)
ALT SERPL-CCNC: 25 IU/L (ref 0–44)
AST SERPL-CCNC: 21 IU/L (ref 0–40)
BASOPHILS # BLD AUTO: 0 X10E3/UL (ref 0–0.2)
BASOPHILS NFR BLD AUTO: 1 %
BILIRUB SERPL-MCNC: 0.5 MG/DL (ref 0–1.2)
BUN SERPL-MCNC: 16 MG/DL (ref 6–24)
BUN/CREAT SERPL: 13 (ref 9–20)
CALCIUM SERPL-MCNC: 9.7 MG/DL (ref 8.7–10.2)
CHLORIDE SERPL-SCNC: 101 MMOL/L (ref 96–106)
CO2 SERPL-SCNC: 25 MMOL/L (ref 20–29)
CREAT SERPL-MCNC: 1.22 MG/DL (ref 0.76–1.27)
EOSINOPHIL # BLD AUTO: 0.2 X10E3/UL (ref 0–0.4)
EOSINOPHIL NFR BLD AUTO: 7 %
ERYTHROCYTE [DISTWIDTH] IN BLOOD BY AUTOMATED COUNT: 14.1 % (ref 12.3–15.4)
GLOBULIN SER CALC-MCNC: 2.4 G/DL (ref 1.5–4.5)
GLUCOSE SERPL-MCNC: 91 MG/DL (ref 65–99)
HCT VFR BLD AUTO: 39.4 % (ref 37.5–51)
HGB BLD-MCNC: 13.8 G/DL (ref 13–17.7)
IMM GRANULOCYTES # BLD AUTO: 0 X10E3/UL (ref 0–0.1)
IMM GRANULOCYTES NFR BLD AUTO: 0 %
LYMPHOCYTES # BLD AUTO: 1.4 X10E3/UL (ref 0.7–3.1)
LYMPHOCYTES NFR BLD AUTO: 53 %
MAGNESIUM SERPL-MCNC: 1.9 MG/DL (ref 1.6–2.3)
MCH RBC QN AUTO: 27.7 PG (ref 26.6–33)
MCHC RBC AUTO-ENTMCNC: 35 G/DL (ref 31.5–35.7)
MCV RBC AUTO: 79 FL (ref 79–97)
MONOCYTES # BLD AUTO: 0.3 X10E3/UL (ref 0.1–0.9)
MONOCYTES NFR BLD AUTO: 10 %
MORPHOLOGY BLD-IMP: ABNORMAL
NEUTROPHILS # BLD AUTO: 0.7 X10E3/UL (ref 1.4–7)
NEUTROPHILS NFR BLD AUTO: 29 %
PLATELET # BLD AUTO: 269 X10E3/UL (ref 150–450)
POTASSIUM SERPL-SCNC: 3.4 MMOL/L (ref 3.5–5.2)
PROT SERPL-MCNC: 7.3 G/DL (ref 6–8.5)
RBC # BLD AUTO: 4.98 X10E6/UL (ref 4.14–5.8)
SODIUM SERPL-SCNC: 144 MMOL/L (ref 134–144)
TSH SERPL DL<=0.005 MIU/L-ACNC: 1.79 UIU/ML (ref 0.45–4.5)
VIT B12 SERPL-MCNC: 618 PG/ML (ref 232–1245)
VIT B6 SERPL-MCNC: 15.9 UG/L (ref 5.3–46.7)
WBC # BLD AUTO: 2.6 X10E3/UL (ref 3.4–10.8)

## 2019-08-02 ENCOUNTER — TELEPHONE (OUTPATIENT)
Dept: FAMILY MEDICINE CLINIC | Age: 49
End: 2019-08-02

## 2019-08-02 NOTE — TELEPHONE ENCOUNTER
Spoke to patient about essentially normal lab results. Only advised on increasing diet with food high in potassium. Will f/u in a week and if Sx still persistent, will refer to neurology.

## 2019-09-18 ENCOUNTER — OFFICE VISIT (OUTPATIENT)
Dept: FAMILY MEDICINE CLINIC | Age: 49
End: 2019-09-18

## 2019-09-18 VITALS
TEMPERATURE: 98.5 F | SYSTOLIC BLOOD PRESSURE: 129 MMHG | HEART RATE: 70 BPM | BODY MASS INDEX: 31.68 KG/M2 | WEIGHT: 239 LBS | DIASTOLIC BLOOD PRESSURE: 85 MMHG | OXYGEN SATURATION: 97 % | RESPIRATION RATE: 19 BRPM | HEIGHT: 73 IN

## 2019-09-18 DIAGNOSIS — L73.1 PSEUDOFOLLICULITIS BARBAE: ICD-10-CM

## 2019-09-18 DIAGNOSIS — I10 ESSENTIAL HYPERTENSION: Primary | ICD-10-CM

## 2019-09-18 NOTE — LETTER
NOTIFICATION RETURN TO WORK / SCHOOL 
 
9/18/2019 4:24 PM 
 
Mr. Cindi Abel [address] [address] To Whom It May Concern: 
 
Cindi Abel is currently under the care of [facility] [employer]. Patient should refrain from shaving due to recurrent skin condition that is associated with shaving. If there are questions or concerns please have the patient contact our office. Sincerely, Lear Shone, DO

## 2019-09-18 NOTE — PATIENT INSTRUCTIONS
Folliculitis: Care Instructions  Your Care Instructions    Folliculitis (say \"tqb-JVC-uer-LY-tus\") is an infection of the pouches (follicles) in the skin where hair grows. It can occur on any part of the body, but it is most common on the scalp, face, armpits, and groin. Bacteria, such as those found in a hot tub, can cause folliculitis. Folliculitis begins as a red, tender area near a strand of hair. The skin can itch or burn and may drain pus or blood. Sometimes folliculitis can lead to more serious skin infections. Your doctor usually can treat mild folliculitis with an antibiotic cream or ointment. If you have folliculitis on your scalp, you may use a shampoo that kills bacteria. Antibiotics you take as pills can treat infections deeper in the skin. For stubborn cases of folliculitis, laser treatment may be an option. Laser treatment uses strong beams of light to destroy the hair follicle. But hair will no longer grow in the treated area. Follow-up care is a key part of your treatment and safety. Be sure to make and go to all appointments, and call your doctor if you are having problems. It's also a good idea to know your test results and keep a list of the medicines you take. How can you care for yourself at home? · Take your medicine exactly as prescribed. If your doctor prescribed antibiotics, take them as directed. Do not stop taking them just because you feel better. You need to take the full course of antibiotics. · Use a soap that kills bacteria to wash the infected area. If your scalp or beard is infected, use a shampoo with selenium or propylene glycol. Be careful. Do not scrub too long or too hard. · Mix 1 1/3 cup warm water and 1 tablespoon vinegar. Soak a cloth in the mixture, and place it over the infected skin until it cools off (usually 5 to 10 minutes). You can do this 3 to 6 times a day. · Do not share your razor, towel, or washcloth. That can spread folliculitis.   · Use a new blade in your razor each time you shave to keep from re-infecting your skin. · If you tend to get folliculitis, avoid using hot tubs. They can contain bacteria that cause folliculitis. When should you call for help? Call your doctor now or seek immediate medical care if:    · You have symptoms of infection, such as:  ? Increased pain, swelling, warmth, or redness. ? Red streaks leading from the area. ? Pus draining from the area. ? A fever.    Watch closely for changes in your health, and be sure to contact your doctor if:    · You do not get better as expected. Where can you learn more? Go to http://omar-cornelius.info/. Enter M257 in the search box to learn more about \"Folliculitis: Care Instructions. \"  Current as of: April 1, 2019  Content Version: 12.1  © 9558-0016 [employer]. Care instructions adapted under license by [employer] (which disclaims liability or warranty for this information). If you have questions about a medical condition or this instruction, always ask your healthcare professional. [address] any warranty or liability for your use of this information. DASH Diet: Care Instructions  Your Care Instructions    The DASH diet is an eating plan that can help lower your blood pressure. DASH stands for Dietary Approaches to Stop Hypertension. Hypertension is high blood pressure. The DASH diet focuses on eating foods that are high in calcium, potassium, and magnesium. These nutrients can lower blood pressure. The foods that are highest in these nutrients are fruits, vegetables, low-fat dairy products, nuts, seeds, and legumes. But taking calcium, potassium, and magnesium supplements instead of eating foods that are high in those nutrients does not have the same effect. The DASH diet also includes whole grains, fish, and poultry.   The DASH diet is one of several lifestyle changes your doctor may recommend to lower your high blood pressure. Your doctor may also want you to decrease the amount of sodium in your diet. Lowering sodium while following the DASH diet can lower blood pressure even further than just the DASH diet alone. Follow-up care is a key part of your treatment and safety. Be sure to make and go to all appointments, and call your doctor if you are having problems. It's also a good idea to know your test results and keep a list of the medicines you take. How can you care for yourself at home? Following the DASH diet  · Eat 4 to 5 servings of fruit each day. A serving is 1 medium-sized piece of fruit, ½ cup chopped or canned fruit, 1/4 cup dried fruit, or 4 ounces (½ cup) of fruit juice. Choose fruit more often than fruit juice. · Eat 4 to 5 servings of vegetables each day. A serving is 1 cup of lettuce or raw leafy vegetables, ½ cup of chopped or cooked vegetables, or 4 ounces (½ cup) of vegetable juice. Choose vegetables more often than vegetable juice. · Get 2 to 3 servings of low-fat and fat-free dairy each day. A serving is 8 ounces of milk, 1 cup of yogurt, or 1 ½ ounces of cheese. · Eat 6 to 8 servings of grains each day. A serving is 1 slice of bread, 1 ounce of dry cereal, or ½ cup of cooked rice, pasta, or cooked cereal. Try to choose whole-grain products as much as possible. · Limit lean meat, poultry, and fish to 2 servings each day. A serving is 3 ounces, about the size of a deck of cards. · Eat 4 to 5 servings of nuts, seeds, and legumes (cooked dried beans, lentils, and split peas) each week. A serving is 1/3 cup of nuts, 2 tablespoons of seeds, or ½ cup of cooked beans or peas. · Limit fats and oils to 2 to 3 servings each day. A serving is 1 teaspoon of vegetable oil or 2 tablespoons of salad dressing. · Limit sweets and added sugars to 5 servings or less a week. A serving is 1 tablespoon jelly or jam, ½ cup sorbet, or 1 cup of lemonade. · Eat less than 2,300 milligrams (mg) of sodium a day. If you limit your sodium to 1,500 mg a day, you can lower your blood pressure even more. Tips for success  · Start small. Do not try to make dramatic changes to your diet all at once. You might feel that you are missing out on your favorite foods and then be more likely to not follow the plan. Make small changes, and stick with them. Once those changes become habit, add a few more changes. · Try some of the following:  ? Make it a goal to eat a fruit or vegetable at every meal and at snacks. This will make it easy to get the recommended amount of fruits and vegetables each day. ? Try yogurt topped with fruit and nuts for a snack or healthy dessert. ? Add lettuce, tomato, cucumber, and onion to sandwiches. ? Combine a ready-made pizza crust with low-fat mozzarella cheese and lots of vegetable toppings. Try using tomatoes, squash, spinach, broccoli, carrots, cauliflower, and onions. ? Have a variety of cut-up vegetables with a low-fat dip as an appetizer instead of chips and dip. ? Sprinkle sunflower seeds or chopped almonds over salads. Or try adding chopped walnuts or almonds to cooked vegetables. ? Try some vegetarian meals using beans and peas. Add garbanzo or kidney beans to salads. Make burritos and tacos with mashed [place] beans or black beans. Where can you learn more? Go to http://omar-cornelius.info/. Enter M323 in the search box to learn more about \"DASH Diet: Care Instructions. \"  Current as of: July 22, 2018  Content Version: 12.1  © 3081-8293 [employer]. Care instructions adapted under license by [employer] (which disclaims liability or warranty for this information). If you have questions about a medical condition or this instruction, always ask your healthcare professional. [address] any warranty or liability for your use of this information.

## 2019-09-18 NOTE — PROGRESS NOTES
Anton Johnston is a 54 y.o. male who had concerns including Blood Pressure Check. Patient states that he shaved about 1 month ago and developed a severe rash/bumps on his face and lower jaw. States that every time he shaves he gets a rash. He recently started a new job that that is requiring him to shave. He has tried multiple shaving techniques and topical treatments w/o relief. HTN  Patient presents today for HTN follow-up. Currently taking Hyzaar. Taking medications daily w/o complications. Home BP readings range from 140/80's. Patient is trying to follow a low salt diet. He is exercising by walking, bike riding and playing sports with his kids. ROS: (positive in bold)  General: wt loss, fever, chills, fatigue   Skin: see HPI  Cardiac: chest pain      Past Medical History:  No past medical history on file. Past Surgical History:  No past surgical history on file. Family History:  Family History   Problem Relation Age of Onset   [address] Hypertension Mother     Diabetes Mother     Hypertension Father        Allergies:  No Known Allergies    Social History:  Social History     Tobacco Use    Smoking status: Never Smoker    Smokeless tobacco: Never Used   Substance Use Topics    Alcohol use: Yes    Drug use: No       Current Meds:  Current Outpatient Medications on File Prior to Visit   Medication Sig Dispense Refill    losartan-hydroCHLOROthiazide (HYZAAR) 100-25 mg per tablet Take 1 Tab by mouth daily. 30 Tab 11    Blood Pressure Test Kit-Large (QUICK RESPONSE BP MONITOR) kit 1 Kit by Does Not Apply route daily. 1 Kit 0    clotrimazole-betamethasone (LOTRISONE) topical cream Apply to affected site on foot twice daily for 14 days 15 g 1    fluticasone (FLONASE) 50 mcg/actuation nasal spray 2 puffs each nostril at bedtime 1 Bottle 3     No current facility-administered medications on file prior to visit.          Visit Vitals  /85   Pulse 70   Temp 98.5 °F (36.9 °C)   Resp 19   Ht 6' 1\" (1.854 m)   Wt 239 lb (108.4 kg)   SpO2 97%   BMI 31.53 kg/m²       Gen:  Well developed, well nourished male in no acute distress  HEENT: normocephalic/atraumatic; PERRL; TM intact, translucent, and neutral BL;  oropharynx shows no erythema or exudates  Skin:  Erythematous papules on lower mandible and mixed w/i facial hair. Card:  RRR, no m/r/g  Chest:  CTAB, no w/r/r    Assessment/Plan:      ICD-10-CM ICD-9-CM    1. Essential hypertension I10 401.9    2. 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 Patient has recurrent despite different OTC shaving techniques and topical treatments. Advised patient to refrain from shaving. Not provided to patient for work. Hypertension:  Stable. Continue current medications. No changes at this time. Recent labs checked and WNL. BP goal discussed with patient. Encouraged well balanced diet with low sodium intake, and setting a goal of 150 minutes of exercise per week. If BP elevated in future will consider adding Norvasc. I have discussed the diagnosis with the patient and the intended plan as seen in the above orders. The patient has received an after-visit summary and questions were answered concerning future plans. I have discussed medication side effects and warnings with the patient as well. The patient agrees and understands above plan. Follow-up and Dispositions    · Return in about 3 months (around 12/18/2019) for Hypertension.            Julita Garrison DO  Sports Medicine Fellow

## 2020-01-08 ENCOUNTER — HOSPITAL ENCOUNTER (EMERGENCY)
Age: 50
Discharge: HOME | End: 2020-01-09
Attending: EMERGENCY MEDICINE
Payer: COMMERCIAL

## 2020-01-08 DIAGNOSIS — S46.001A INJURY OF RIGHT ROTATOR CUFF, INITIAL ENCOUNTER: ICD-10-CM

## 2020-01-08 DIAGNOSIS — I10 ESSENTIAL HYPERTENSION: ICD-10-CM

## 2020-01-08 DIAGNOSIS — S43.401A SPRAIN OF RIGHT SHOULDER, UNSPECIFIED SHOULDER SPRAIN TYPE, INITIAL ENCOUNTER: Primary | ICD-10-CM

## 2020-01-08 PROCEDURE — 99283 EMERGENCY DEPT VISIT LOW MDM: CPT

## 2020-01-09 ENCOUNTER — APPOINTMENT (OUTPATIENT)
Dept: GENERAL RADIOLOGY | Age: 50
End: 2020-01-09
Attending: EMERGENCY MEDICINE
Payer: COMMERCIAL

## 2020-01-09 VITALS
TEMPERATURE: 98.2 F | HEIGHT: 73 IN | BODY MASS INDEX: 31.81 KG/M2 | DIASTOLIC BLOOD PRESSURE: 114 MMHG | HEART RATE: 70 BPM | OXYGEN SATURATION: 97 % | RESPIRATION RATE: 16 BRPM | WEIGHT: 240 LBS | SYSTOLIC BLOOD PRESSURE: 174 MMHG

## 2020-01-09 PROCEDURE — 73030 X-RAY EXAM OF SHOULDER: CPT

## 2020-01-09 RX ORDER — NAPROXEN 500 MG/1
500 TABLET ORAL 2 TIMES DAILY WITH MEALS
Qty: 20 TAB | Refills: 0 | Status: SHIPPED | OUTPATIENT
Start: 2020-01-09 | End: 2021-05-17

## 2020-01-09 NOTE — DISCHARGE INSTRUCTIONS
Patient Education        Shoulder Sprain: Care Instructions  Your Care Instructions    A shoulder sprain occurs when you stretch or tear a ligament in your shoulder. Ligaments are tough tissues that connect one bone to another. A sprain can happen during sports, a fall, or projects around the house. Shoulder sprains usually get better with treatment at home. Follow-up care is a key part of your treatment and safety. Be sure to make and go to all appointments, and call your doctor if you are having problems. It's also a good idea to know your test results and keep a list of the medicines you take. How can you care for yourself at home? · Rest and protect your shoulder. Try to stop or reduce any action that causes pain. · If your doctor gave you a sling or immobilizer, wear it as directed. A sling or immobilizer supports your shoulder and may make you more comfortable. · Put ice or a cold pack on your shoulder for 10 to 20 minutes at a time. Try to do this every 1 to 2 hours for the next 3 days (when you are awake) or until the swelling goes down. Put a thin cloth between the ice and your skin. Some doctors suggest alternating between hot and cold. · Be safe with medicines. Read and follow all instructions on the label. ? If the doctor gave you a prescription medicine for pain, take it as prescribed. ? If you are not taking a prescription pain medicine, ask your doctor if you can take an over-the-counter medicine. · For the first day or two after an injury, avoid things that might increase swelling, such as hot showers, hot tubs, or hot packs. · After 2 or 3 days, if your swelling is gone, apply a heating pad set on low or a warm cloth to your shoulder. This helps keep your shoulder flexible. Some doctors suggest that you go back and forth between hot and cold. Put a thin cloth between the heating pad and your skin. · Follow your doctor's or physical therapist's directions for exercises.   · Return to your usual level of activity slowly. When should you call for help? Call your doctor now or seek immediate medical care if:    · Your pain is worse.     · You cannot move your shoulder.     · Your arm is cool or pale or changes color below the shoulder.     · You have tingling, weakness, or numbness in your arm.    Watch closely for changes in your health, and be sure to contact your doctor if:    · You do not get better as expected. Where can you learn more? Go to http://omar-cornelius.info/. Enter I530 in the search box to learn more about \"Shoulder Sprain: Care Instructions. \"  Current as of: June 26, 2019  Content Version: 12.2  © 9507-2412 [employer]. Care instructions adapted under license by [employer] (which disclaims liability or warranty for this information). If you have questions about a medical condition or this instruction, always ask your healthcare professional. [place] [address] any warranty or liability for your use of this information. Patient Education        Rotator Cuff Injury: Care Instructions  Your Care Instructions    The rotator cuff is a group of tendons and muscles around the shoulder that keeps the upper arm bone in place. It keeps the shoulder joint stable and allows you to raise and rotate your arm. Damage to the rotator cuff can be caused by overuse, a fall, or a direct blow to the shoulder area, which can tear the tendons. Over time, everyday wear can damage the tendons and make injury more likely. Treatment can depend upon the amount of damage to the tendons. In a younger person, surgery may be the first choice. But if you are older than 25, you likely have some wear on your rotator cuff. Surgery may not be the most effective treatment. Your doctor may have you try physical therapy and exercise first.  Follow-up care is a key part of your treatment and safety.  Be sure to make and go to all appointments, and call your doctor if you are having problems. It's also a good idea to know your test results and keep a list of the medicines you take. How can you care for yourself at home? · Rest your shoulder as much as you can. If your doctor put your arm in a sling or shoulder immobilizer, wear it as directed. Do not take it off before your doctor tells you to. If it is too tight, loosen it. · Be safe with medicines. Read and follow all instructions on the label. ? If the doctor gave you a prescription medicine for pain, take it as prescribed. ? If you are not taking a prescription pain medicine, ask your doctor if you can take an over-the-counter medicine. · Put ice or a cold pack on your shoulder for 10 to 20 minutes at a time. Try to do this every 1 to 2 hours for the next 3 days (when you are awake). Put a thin cloth between the ice pack and your skin. · After 3 days, put a warm, wet towel on your shoulder. This is to relax the muscles and help blood flow. · While holding a warm, wet towel on your shoulder, lean forward so your arm hangs freely, and gently swing your arm back and forth like a pendulum. You also can do this standing under a warm shower. · Do not do anything that makes your pain worse. · Follow your doctor's advice about whether you need physical therapy. When should you call for help? Call your doctor now or seek immediate medical care if:    · You have severe pain.     · You cannot move your shoulder or arm.     · You have tingling or numbness in your arm or hand.     · Your arm or hand is cool or pale.    Watch closely for changes in your health, and be sure to contact your doctor if:    · Your pain gets worse.     · You have new or worse swelling in your arm or hand.     · You do not get better as expected. Where can you learn more? Go to http://omar-cornelius.info/. Enter 039 [id number] in the search box to learn more about \"Rotator Cuff Injury: Care Instructions. \"  Current as of: June 26, 2019  Content Version: 12.2  © 6047-7816 [employer]. Care instructions adapted under license by [employer] (which disclaims liability or warranty for this information). If you have questions about a medical condition or this instruction, always ask your healthcare professional. [address] any warranty or liability for your use of this information.

## 2020-01-09 NOTE — ED TRIAGE NOTES
Pt here for right shoulder pain after hearing a \"pop\" when  lifting a ladder earlier today. Reports area \"looks funky\".

## 2020-01-09 NOTE — ED NOTES
Discharge given by provider. Pt verbalizes understanding. MD aware of high BP. Pt is ambulatory and leaving with wife.

## 2020-01-09 NOTE — ED PROVIDER NOTES
54 y.o. male with past medical history significant for HTN who presents from private vehicle with chief complaint of shoulder pain. Pt reports right shoulder pain after picking up a ladder earlier today. Pt notes accompanying right shoulder swelling and right shoulder numbness. Pt has no other complaints. There are no other acute medical concerns at this time. Note written by Sasha Sawyer, as dictated by Randy Blackwood MD 11:44 PM         The history is provided by the patient. No  was used. No past medical history on file. No past surgical history on file. Family History:   Problem Relation Age of Onset    Hypertension Mother     Diabetes Mother     Hypertension Father        Social History     Socioeconomic History    Marital status: LIFE PARTNER     Spouse name: Not on file    Number of children: Not on file    Years of education: Not on file    Highest education level: Not on file   Occupational History    Not on file   Social Needs    Financial resource strain: Not on file    Food insecurity:     Worry: Not on file     Inability: Not on file    Transportation needs:     Medical: Not on file     Non-medical: Not on file   Tobacco Use    Smoking status: Never Smoker    Smokeless tobacco: Never Used   Substance and Sexual Activity    Alcohol use:  Yes    Drug use: No    Sexual activity: Yes     Partners: Female     Birth control/protection: Condom   Lifestyle    Physical activity:     Days per week: Not on file     Minutes per session: Not on file    Stress: Not on file   Relationships    Social connections:     Talks on phone: Not on file     Gets together: Not on file     Attends Christian service: Not on file     Active member of club or organization: Not on file     Attends meetings of clubs or organizations: Not on file     Relationship status: Not on file    Intimate partner violence:     Fear of current or ex partner: Not on file Emotionally abused: Not on file     Physically abused: Not on file     Forced sexual activity: Not on file   Other Topics Concern    Not on file   Social History Narrative    Not on file         ALLERGIES: Patient has no known allergies. Review of Systems   Musculoskeletal: Positive for arthralgias and joint swelling.        + Right shoulder pain. Neurological: Positive for numbness (Right shoulder). All other systems reviewed and are negative. Vitals:    01/08/20 2320   BP: (!) 174/101   Pulse: 67   Resp: 14   Temp: 98.2 °F (36.8 °C)   SpO2: 96%   Weight: 108.9 kg (240 lb)   Height: 6' 1\" (1.854 m)            Physical Exam  Vitals signs and nursing note reviewed. Musculoskeletal:      Right shoulder: He exhibits decreased range of motion and tenderness. He exhibits no bony tenderness, no swelling, no effusion, no crepitus, no deformity, no laceration, no pain, no spasm, normal pulse and normal strength. Arms:           CONSTITUTIONAL: Well-appearing; well-nourished; in mild distress  HEAD: Normocephalic; atraumatic  EYES: PERRL; EOM intact; conjunctiva and sclera are clear bilaterally. ENT: No rhinorrhea; normal pharynx with no tonsillar hypertrophy; mucous membranes pink/moist, no erythema, no exudate. NECK: Supple; non-tender; no cervical lymphadenopathy  CARD: Normal S1, S2; no murmurs, rubs, or gallops. Regular rate and rhythm. RESP: Normal respiratory effort; breath sounds clear and equal bilaterally; no wheezes, rhonchi, or rales. ABD: Normal bowel sounds; non-distended; non-tender; no palpable organomegaly, no masses, no bruits. Back Exam: Normal inspection; no vertebral point tenderness, no CVA tenderness. Normal range of motion. EXT: Normal ROM in all four extremities; non-tender to palpation; no swelling or deformity; distal pulses are normal, no edema. SKIN: Warm; dry; no rash.   NEURO:Alert and oriented x 3, coherent, JUAN DAVID-XII grossly intact, sensory and motor are non-focal.        MDM  Number of Diagnoses or Management Options  Injury of right rotator cuff, initial encounter:   Sprain of right shoulder, unspecified shoulder sprain type, initial encounter:   Diagnosis management comments: Assessment: Right shoulder injury suspect strain possible rotator cuff injury rule out fracture    Plan: X-ray of the right shoulder/education, reassurance, symptomatic treatment/ Monitor and Reevaluate. Amount and/or Complexity of Data Reviewed  Clinical lab tests: ordered and reviewed  Tests in the radiology section of CPT®: ordered and reviewed  Tests in the medicine section of CPT®: reviewed and ordered  Discussion of test results with the performing providers: yes  Decide to obtain previous medical records or to obtain history from someone other than the patient: yes  Obtain history from someone other than the patient: yes  Review and summarize past medical records: yes  Discuss the patient with other providers: yes  Independent visualization of images, tracings, or specimens: yes    Risk of Complications, Morbidity, and/or Mortality  Presenting problems: low  Diagnostic procedures: low  Management options: low           Procedures    XRAY INTERPRETATION (ED MD)  Xray of right shoulder shows no fracture. No subluxation/dislocation. No bony abnormality. Hanh Rodríguez MD 12:26 AM    Progress Note:   Pt has been reexamined by Serina Craft MD. Pt is feeling much better. Symptoms have improved. All available results have been reviewed with pt and any available family. Pt understands sx, dx, and tx in ED. Care plan has been outlined and questions have been answered. Pt is ready to go home. Will send home on right shoulder sprain/rotator cuff injury instruction. Prescription of naproxen. . Outpatient referral with Ortho as needed. Written by Serina Craft MD,12:26 AM    .   .

## 2020-01-16 ENCOUNTER — OFFICE VISIT (OUTPATIENT)
Dept: FAMILY MEDICINE CLINIC | Age: 50
End: 2020-01-16

## 2020-01-16 VITALS
HEART RATE: 69 BPM | HEIGHT: 73 IN | SYSTOLIC BLOOD PRESSURE: 142 MMHG | BODY MASS INDEX: 31.81 KG/M2 | WEIGHT: 240 LBS | TEMPERATURE: 98.5 F | OXYGEN SATURATION: 95 % | RESPIRATION RATE: 19 BRPM | DIASTOLIC BLOOD PRESSURE: 89 MMHG

## 2020-01-16 DIAGNOSIS — M25.511 RIGHT SHOULDER PAIN, UNSPECIFIED CHRONICITY: Primary | ICD-10-CM

## 2020-01-16 RX ORDER — CYCLOBENZAPRINE HCL 10 MG
10 TABLET ORAL
Qty: 15 TAB | Refills: 0 | Status: SHIPPED | OUTPATIENT
Start: 2020-01-16 | End: 2021-05-17 | Stop reason: ALTCHOICE

## 2020-01-16 NOTE — PROGRESS NOTES
History of Present Illness     Patient Identification  Des Snyder is a 54 y.o. male complains of pain in the right shoulder pain for about 1 week. Pain started when he picked up a ladder. Pain mostly in the trap area. No radiating pain. He went to the ER after the injury and radiographs were normal.  He was prescribed naproxen and told to f/u with PCP. He has tried naproxen and motrin with minimal relief. No past medical history on file. Family History   Problem Relation Age of Onset    Hypertension Mother     Diabetes Mother     Hypertension Father      Current Outpatient Medications   Medication Sig Dispense Refill    cyclobenzaprine (FLEXERIL) 10 mg tablet Take 1 Tab by mouth nightly as needed for Muscle Spasm(s). 15 Tab 0    naproxen (NAPROSYN) 500 mg tablet Take 1 Tab by mouth two (2) times daily (with meals). 20 Tab 0    losartan-hydroCHLOROthiazide (HYZAAR) 100-25 mg per tablet Take 1 Tab by mouth daily. 30 Tab 11    Blood Pressure Test Kit-Large (QUICK RESPONSE BP MONITOR) kit 1 Kit by Does Not Apply route daily. 1 Kit 0    clotrimazole-betamethasone (LOTRISONE) topical cream Apply to affected site on foot twice daily for 14 days 15 g 1    fluticasone (FLONASE) 50 mcg/actuation nasal spray 2 puffs each nostril at bedtime 1 Bottle 3     No Known Allergies    Review of Systems  Pertinent items are noted in HPI. Physical Exam     Visit Vitals  /89   Pulse 69   Temp 98.5 °F (36.9 °C)   Resp 19   Ht 6' 1\" (1.854 m)   Wt 240 lb (108.9 kg)   SpO2 95%   BMI 31.66 kg/m²       GEN: Well appearing. No apparent distress. Responds to all questions appropriately. Lungs: No labored respirations. Talking in complete sentences without difficulty.   Shoulder: right  Deformity: None    ROM:  Forward Flexion: Active: 180     ER (0): Active: 45     IR (0): Active: Behind the body to the level L1 (limited by pain)  Abduction: Active: 180       Palpation:  AC tenderness: None  SC tenderness: None  Clavicle tenderness: None  Biceps tenderness: None  Tenderness in the right trap with 1 trigger point identified. Strength (0-5/5):  Deltoid - Anterior: 5/5  Deltoid - Posterior: 5/5  Deltoid - Mid: 5/5  Supraspinatus: 5/5  External rotation: 5/5  Internal rotation: 5/5    Neuro/Vascular:  Pulses intact, no edema, and neurologically intact    C-Spine:  Cervical motion: FROM without pain. Cervical tenderness: None    Skin: No obvious rash or skin breakdown. Imaging: Radiographs of the right shoulder from 1/9/2020 personally reviewed and demonstrates no obvious fracture or dislocation. Assessment:  Right shoulder injury. Likely trap strain with spasm. Plan:  1. Home Exercise Program as per handout. 2. Ice 15 minutes, three times a day PRN and after exercise. Can alternate with heat for 15 minutes. 3. He declined trigger point injection but said he would reconsider if not improving. Medications:    1. Naproxin (Aleve): 220mg 1-2 tablets twice a day PRN. 2. Acetaminophen (Tylenol):  500mg 1-2 tablets every 6 hours as needed for pain.     RTC: PRN

## 2020-02-03 ENCOUNTER — HOSPITAL ENCOUNTER (OUTPATIENT)
Dept: GENERAL RADIOLOGY | Age: 50
Discharge: HOME | End: 2020-02-03
Attending: INTERNAL MEDICINE
Payer: COMMERCIAL

## 2020-02-03 DIAGNOSIS — M54.50 LUMBAGO: ICD-10-CM

## 2020-02-03 PROCEDURE — 72100 X-RAY EXAM L-S SPINE 2/3 VWS: CPT

## 2020-04-21 ENCOUNTER — OFFICE VISIT (OUTPATIENT)
Dept: SLEEP MEDICINE | Age: 50
End: 2020-04-21

## 2020-04-21 VITALS
DIASTOLIC BLOOD PRESSURE: 90 MMHG | HEART RATE: 71 BPM | SYSTOLIC BLOOD PRESSURE: 149 MMHG | WEIGHT: 240 LBS | HEIGHT: 73 IN | TEMPERATURE: 97.6 F | RESPIRATION RATE: 20 BRPM | BODY MASS INDEX: 31.81 KG/M2 | OXYGEN SATURATION: 100 %

## 2020-11-30 ENCOUNTER — DOCUMENTATION ONLY (OUTPATIENT)
Dept: SLEEP MEDICINE | Age: 50
End: 2020-11-30

## 2020-11-30 ENCOUNTER — VIRTUAL VISIT (OUTPATIENT)
Dept: SLEEP MEDICINE | Age: 50
End: 2020-11-30
Payer: COMMERCIAL

## 2020-11-30 DIAGNOSIS — G47.33 OBSTRUCTIVE SLEEP APNEA (ADULT) (PEDIATRIC): Primary | ICD-10-CM

## 2020-11-30 DIAGNOSIS — I10 ESSENTIAL HYPERTENSION: ICD-10-CM

## 2020-11-30 PROCEDURE — 99204 OFFICE O/P NEW MOD 45 MIN: CPT | Performed by: INTERNAL MEDICINE

## 2020-11-30 NOTE — PROGRESS NOTES
[address] [place]. [place], [address]  Tel.  [phone]  Fax. [address] [facility] [address]  [place], [address] [facility]  Tel.  [phone]  Fax. [phone] [address] [facility] Dave Bill  Tel.  [phone]  Fax. [phone]         Subjective:        Telemedicine visit performed with verbal consent of the patient. Patient called and identity confirmed with 2 patient identifers    Patient was seen in his parked car  Rhonda Larry is a 52 y.o. male who was seen by synchronous (real-time) audio-video technology on 11/30/2020. Consent:  He and/or his healthcare decision maker is aware that this patient-initiated Telehealth encounter is the equivalent to a face to face encounter in the sleep disorder center and has provided verbal consent to proceed: Yes    I was at home while conducting this encounter. Rhonda Larry is an 52 y.o. male self-referred for evaluation for a sleep disorder. He complains of snoring, periods of not breathing associated with excessive daytime sleepiness. Symptoms began a few years ago, gradually worsening since that time. He usually can fall asleep in a few minutes. Family or house members note snoring, periods of not breathing. He denies falling asleep while during driving  Rhonda Larry does not wake up frequently at night. He is not bothered by waking up too early and left unable to get back to sleep. He actually sleeps about 5 hours at night and wakes up about 3 times during the night. He does not work shifts: Merna Tapia indicates he does get too little sleep at night. His bedtime is 0100. He awakens at 0300. He does not take naps. . He has the following observed behaviors: Loud snoring, Light snoring, Pauses in breathing; (waking with a gasp or snort). Other remarks:      Canmer Sleepiness Score: 17 which reflect moderate daytime drowsiness.     No Known Allergies      Current Outpatient Medications:   cyclobenzaprine (FLEXERIL) 10 mg tablet, Take 1 Tab by mouth nightly as needed for Muscle Spasm(s). , Disp: 15 Tab, Rfl: 0    naproxen (NAPROSYN) 500 mg tablet, Take 1 Tab by mouth two (2) times daily (with meals). , Disp: 20 Tab, Rfl: 0    losartan-hydroCHLOROthiazide (HYZAAR) 100-25 mg per tablet, Take 1 Tab by mouth daily. , Disp: 30 Tab, Rfl: 11    Blood Pressure Test Kit-Large (QUICK RESPONSE BP MONITOR) kit, 1 Kit by Does Not Apply route daily. , Disp: 1 Kit, Rfl: 0    clotrimazole-betamethasone (LOTRISONE) topical cream, Apply to affected site on foot twice daily for 14 days, Disp: 15 g, Rfl: 1    fluticasone (FLONASE) 50 mcg/actuation nasal spray, 2 puffs each nostril at bedtime, Disp: 1 Bottle, Rfl: 3     He  has a past medical history of Hypertension. He  has a past surgical history that includes hx appendectomy and hx heent. He family history includes Diabetes in his mother; Hypertension in his father and mother. He  reports that he has never smoked. He has never used smokeless tobacco. He reports current alcohol use. He reports that he does not use drugs.      Review of Systems:  Constitutional: + weight gain  Eyes:  No blurred vision  CVS:  No significant chest pain  Pulm:  No significant shortness of breath  GI:  No significant nausea or vomiting  :  No significant nocturia  Musculoskeletal:  No significant joint pain at night  Skin:  No significant rashes  Neuro:  No significant dizziness   Psych:  No active mood issues    Sleep Review of Systems: notable for no difficulty falling asleep; +frequent awakenings at night;  + dreaming noted; no nightmares ; no early morning headaches; no memory problems; no concentration issues    Objective:     Weight 240 lb  Vital Signs: (As obtained by patient/caregiver at home)        Constitutional: [x] Appears well-developed and well-nourished [x] No apparent distress      [] Abnormal -     Mental status: [x] Alert and awake  [x] Oriented to person/place/time [x] Able to follow commands    [] Abnormal -     Eyes:   EOM    [x]  Normal    [] Abnormal -   Sclera  [x]  Normal    [] Abnormal -          Discharge [x]  None visible   [] Abnormal -     HENT: [x] Normocephalic, atraumatic  [] Abnormal -   [x] Mouth/Throat: Mucous membranes are moist               External Ears [x] Normal  [] Abnormal -    Neck: [x] No visualized mass [] Abnormal -     Pulmonary/Chest: [x] Respiratory effort normal   [x] No visualized signs of difficulty breathing or respiratory distress        [] Abnormal -       Neurological:        [x] No Facial Asymmetry (Cranial nerve 7 motor function) (limited exam due to video visit)          [x] No gaze palsy        [] Abnormal -          Skin:        [x] No significant exanthematous lesions or discoloration noted on facial skin         [] Abnormal -            Psychiatric:       [x] Normal Affect [] Abnormal -       Other pertinent observable physical exam findings:-          Assessment:       ICD-10-CM ICD-9-CM    1. Obstructive sleep apnea (adult) (pediatric)  G47.33 327.23 SLEEP STUDY UNATTENDED, 4 CHANNEL   2. Essential hypertension  I10 401.9          Plan:       * Sleep testing was ordered for initial evaluation. * He was provided information on sleep apnea including coresponding risk factors and the importance of proper treatment. * Treatment options for sleep apnea were reviewed today. Patient agrees to a trial of APAP therapy if indicated. * Counseling was provided regarding proper sleep hygiene, appropriate sleep schedule, need for sleep environment safety and safe driving. * Effect of sleep disturbance on weight was reviewed. We have recommended a dedicated weight loss through appropriate diet and an exercise regimen as significant weight reduction has been shown to reduce severity of obstructive sleep apnea.      * Patient agrees to telephone follow-up by sleep technologist shortly after sleep study to review results and plan final management. 2. Hypertension - he continues on his current regimen. I have reviewed the relationship between hypertension as it relates to sleep-disordered breathing. The treatment plan was reviewed with the patient in detail and reviewed with the patient . he understands that the lead technologist will be calling him  with the results and assisting with the next step in the treatment plan as outlined today during the consultation with me. All of his questions were addressed. Pursuant to the emergency declaration under the [address] [place] [address], [facility] waiver authority and the [employer] Act, this Virtual  Visit was conducted, with patient's consent, to reduce the patient's risk of exposure to COVID-19 and provide continuity of care for an established patient. Services were provided through a video synchronous discussion virtually to substitute for in-person clinic visit.     Fermin Caba MD    Electronically signed by    Tova Corona MD  Diplomate in Sleep Medicine  [facility]

## 2020-11-30 NOTE — PATIENT INSTRUCTIONS
217 [facility] [place]. [address], [address] Tel.  [phone] Fax. 100 [facility] [address] [place], [address] Tel.  [phone] Fax. [phone] [address] Tel.  [phone] Fax. [phone] Sleep Apnea: After Your Visit Your Care Instructions Sleep apnea occurs when you frequently stop breathing for 10 seconds or longer during sleep. It can be mild to severe, based on the number of times per hour that you stop breathing or have slowed breathing. Blocked or narrowed airways in your nose, mouth, or throat can cause sleep apnea. Your airway can become blocked when your throat muscles and tongue relax during sleep. Sleep apnea is common, occurring in 1 out of 20 individuals. Individuals having any of the following characteristics should be evaluated and treated right away due to high risk and detrimental consequences from untreated sleep apnea: 
1. Obesity 2. Congestive Heart failure 3. Atrial Fibrillation 4. Uncontrolled Hypertension 5. Type II Diabetes 6. Night-time Arrhythmias 7. Stroke 8. Pulmonary Hypertension 9. High-risk Driving Populations (pilots, truck drivers, etc.) 10. Patients Considering Weight-loss Surgery How do you know you have sleep apnea? You probably have sleep apnea if you answer 'yes' to 3 or more of the following questions: S - Have you been told that you Snore? T - Are you often Tired during the day? O - Has anyone Observed you stop breathing while sleeping? P- Do you have (or are being treated for) high blood Pressure? B - Are you obese (Body Mass Index > 35)? A - Is your Age 48years old or older? N - Is your Neck size greater than 16 inches? G - Are you male Gender? A sleep physician can prescribe a breathing device that prevents tissues in the throat from blocking your airway.  Or your doctor may recommend using a dental device (oral breathing device) to help keep your airway open. In some cases, surgery may be needed to remove enlarged tissues in the throat. Follow-up care is a key part of your treatment and safety. Be sure to make and go to all appointments, and call your doctor if you are having problems. It's also a good idea to know your test results and keep a list of the medicines you take. How can you care for yourself at home? · Lose weight, if needed. It may reduce the number of times you stop breathing or have slowed breathing. · Go to bed at the same time every night. · Sleep on your side. It may stop mild apnea. If you tend to roll onto your back, sew a pocket in the back of your pajama top. Put a tennis ball into the pocket, and stitch the pocket shut. This will help keep you from sleeping on your back. · Avoid alcohol and medicines such as sleeping pills and sedatives before bed. · Do not smoke. Smoking can make sleep apnea worse. If you need help quitting, talk to your doctor about stop-smoking programs and medicines. These can increase your chances of quitting for good. · Prop up the head of your bed 4 to 6 inches by putting bricks under the legs of the bed. · Treat breathing problems, such as a stuffy nose, caused by a cold or allergies. · Use a continuous positive airway pressure (CPAP) breathing machine if lifestyle changes do not help your apnea and your doctor recommends it. The machine keeps your airway from closing when you sleep. · If CPAP does not help you, ask your doctor whether you should try other breathing machines. A bilevel positive airway pressure machine has two types of air pressureâone for breathing in and one for breathing out. Another device raises or lowers air pressure as needed while you breathe. · If your nose feels dry or bleeds when using one of these machines, talk with your doctor about increasing moisture in the air. A humidifier may help.  
· If your nose is runny or stuffy from using a breathing machine, talk with your doctor about using decongestants or a corticosteroid nasal spray. When should you call for help? Watch closely for changes in your health, and be sure to contact your doctor if: 
· You still have sleep apnea even though you have made lifestyle changes. · You are thinking of trying a device such as CPAP. · You are having problems using a CPAP or similar machine. Where can you learn more? Go to Planspot. Enter T225 in the search box to learn more about \"Sleep Apnea: After Your Visit. \"  
© 9157-5622 Healthwise, Incorporated. Care instructions adapted under license by [facility] [employer] (which disclaims liability or warranty for this information). This care instruction is for use with your licensed healthcare professional. If you have questions about a medical condition or this instruction, always ask your healthcare professional. Latoyaangelique Philipradha any warranty or liability for your use of this information. PROPER SLEEP HYGIENE What to avoid · Do not have drinks with caffeine, such as coffee or black tea, for 8 hours before bed. · Do not smoke or use other types of tobacco near bedtime. Nicotine is a stimulant and can keep you awake. · Avoid drinking alcohol late in the evening, because it can cause you to wake in the middle of the night. · Do not eat a big meal close to bedtime. If you are hungry, eat a light snack. · Do not drink a lot of water close to bedtime, because the need to urinate may wake you up during the night. · Do not read or watch TV in bed. Use the bed only for sleeping and sexual activity. What to try · Go to bed at the same time every night, and wake up at the same time every morning. Do not take naps during the day. · Keep your bedroom quiet, dark, and cool. · Get regular exercise, but not within 3 to 4 hours of your bedtime. Jovany Stewart · Sleep on a comfortable pillow and mattress. · If watching the clock makes you anxious, turn it facing away from you so you cannot see the time. · If you worry when you lie down, start a worry book. Well before bedtime, write down your worries, and then set the book and your concerns aside. · Try meditation or other relaxation techniques before you go to bed. · If you cannot fall asleep, get up and go to another room until you feel sleepy. Do something relaxing. Repeat your bedtime routine before you go to bed again. · Make your house quiet and calm about an hour before bedtime. Turn down the lights, turn off the TV, log off the computer, and turn down the volume on music. This can help you relax after a busy day. Drowsy Driving [employer] cites drowsiness as a causing factor in more than 953,526 police reported crashes annually, resulting in 76,000 injuries and 1,500 deaths. Other surveys suggest 55% of people polled have driven while drowsy in the past year, 23% had fallen asleep but not crashed, 3% crashed, and 2% had and accident due to drowsy driving. Who is at risk? Young Drivers: One study of drowsy driving accidents states that 55% of the drivers were under 25 years. Of those, 75% were male. Shift Workers and Travelers: People who work overnight or travel across time zones frequently are at higher risk of experiencing Circadian Rhythm Disorders. They are trying to work and function when their body is programed to sleep. Sleep Deprived: Lack of sleep has a serious impact on your ability to pay attention or focus on a task. Consistently getting less than the average of 8 hours your body needs creates partial or cumulative sleep deprivation. Untreated Sleep Disorders: Sleep Apnea, Narcolepsy, R.L.S., and other sleep disorders (untreated) prevent a person from getting enough restful sleep.  This leads to excessive daytime sleepiness and increases the risk for drowsy driving accidents by up to 7 times. Medications / Alcohol: Even over the counter medications can cause drowsiness. Medications that impair a drivers attention should have a warning label. Alcohol naturally makes you sleepy and on its own can cause accidents. Combined with excessive drowsiness its effects are amplified. Signs of Drowsy Driving: * You don't remember driving the last few miles * You may drift out of your jah * You are unable to focus and your thoughts wander * You may yawn more often than normal 
 * You have difficulty keeping your eyes open / nodding off * Missing traffic signs, speeding, or tailgating Prevention-  
Good sleep hygiene, lifestyle and behavioral choices have the most impact on drowsy driving. There is no substitute for sleep and the average person requires 8 hours nightly. If you find yourself driving drowsy, stop and sleep. Consider the sleep hygiene tips provided during your visit as well. Medication Refill Policy: Refills for all medications require 1 week advance notice. Please have your pharmacy fax a refill request. We are unable to fax, or call in \"controled substance\" medications and you will need to pick these prescriptions up from our office. [employer] Activation Thank you for requesting access to [employer]. Please follow the instructions below to securely access and download your online medical record. [employer] allows you to send messages to your doctor, view your test results, renew your prescriptions, schedule appointments, and more. How Do I Sign Up? 1. In your internet browser, go to [email or web address]. 2. Click on the First Time User? Click Here link in the Sign In box. You will see the New Member Sign Up page. 3. Enter your [employer] Access Code exactly as it appears below. You will not need to use this code after youve completed the sign-up process.  If you do not sign up before the expiration date, you must request a new code. [employer] Access Code: [email or web address] Expires: 2021  8:35 AM (This is the date your [employer] access code will ) 4. Enter the last four digits of your Social Security Number (xxxx) and Date of Birth (mm/dd/yyyy) as indicated and click Submit. You will be taken to the next sign-up page. 5. Create a [employer] ID. This will be your [employer] login ID and cannot be changed, so think of one that is secure and easy to remember. 6. Create a [employer] password. You can change your password at any time. 7. Enter your Password Reset Question and Answer. This can be used at a later time if you forget your password. 8. Enter your e-mail address. You will receive e-mail notification when new information is available in [address]. 9. Click Sign Up. You can now view and download portions of your medical record. 10. Click the Download Summary menu link to download a portable copy of your medical information. Additional Information If you have questions, please call [phone]. Remember, [employer] is NOT to be used for urgent needs. For medical emergencies, dial 911.

## 2021-05-17 ENCOUNTER — VIRTUAL VISIT (OUTPATIENT)
Dept: FAMILY MEDICINE CLINIC | Age: 51
End: 2021-05-17
Payer: MEDICAID

## 2021-05-17 DIAGNOSIS — I10 ESSENTIAL HYPERTENSION: Primary | ICD-10-CM

## 2021-05-17 DIAGNOSIS — Z00.00 HEALTHCARE MAINTENANCE: ICD-10-CM

## 2021-05-17 DIAGNOSIS — D72.819 LEUKOPENIA, UNSPECIFIED TYPE: ICD-10-CM

## 2021-05-17 PROCEDURE — 99213 OFFICE O/P EST LOW 20 MIN: CPT | Performed by: STUDENT IN AN ORGANIZED HEALTH CARE EDUCATION/TRAINING PROGRAM

## 2021-05-17 RX ORDER — AMLODIPINE BESYLATE 10 MG/1
10 TABLET ORAL DAILY
Qty: 60 TAB | Refills: 1 | Status: SHIPPED | OUTPATIENT
Start: 2021-05-17 | End: 2022-02-04 | Stop reason: SDUPTHER

## 2021-05-17 NOTE — PROGRESS NOTES
Jessica Olmos  54 y.o. male  1970  [address]  [id number]   [address]:    Telemedicine Progress Note  Heladio Sandra MD       Encounter Date and Time: May 17, 2021 at 10:51 PM    Consent: Jessica Olmos, who was seen by synchronous (real-time) audio-video technology, and/or his healthcare decision maker, is aware that this patient-initiated, Telehealth encounter on 5/17/2021 is a billable service, with coverage as determined by his insurance carrier. He is aware that he may receive a bill and has provided verbal consent to proceed: Yes. Chief Complaint   Patient presents with    Hypertension     History of Present Illness   Jessica Olmos is a 54 y.o. male was evaluated by synchronous (real-time) audio-video technology from home, through a secure patient portal.    Patient has a history of HTN, HLD, and elevated glucose. He presents for BP follow up. HTN: Not checking BP regularly; but most recent /99 two days ago. Had a /100 while at urgent care for COVID three weeks ago. Taking Losartan-HCTZ 100-25mg daily. Misses doses every other day. Denies chest pain, vision changes, or SOB. Review of Systems   Review of Systems   Constitutional: Negative for chills and fever. Eyes: Negative for blurred vision and double vision. Cardiovascular: Negative for chest pain and palpitations. Gastrointestinal: Negative for nausea and vomiting.        Vitals/Objective:     General: alert, cooperative, no distress   Mental  status: mental status: alert, oriented to person, place, and time, normal mood, behavior, speech, dress, motor activity, and thought processes   Resp: resp: normal effort and no respiratory distress   Neuro: neuro: no gross deficits   Skin: skin: normal coloration and turgor, no rashes, no suspicious skin lesions noted  no discoloration or lesions of concern on visible areas   Due to this being a TeleHealth evaluation, many elements of the physical examination are unable to be assessed. Assessment and Plan:     Assessment/Plan:    HTN: Poorly controlled. -Start Norvasc 10mg daily  -Continue Losartan-HCTZ 100-25mg daily  -Reduce salt intake  -BMP    Health Maintenance:   -CBC, lipid while coming for labs      Time spent in direct conversation with the patient to include medical condition(s) discussed, assessment and treatment plan:       We discussed the expected course, resolution and complications of the diagnosis(es) in detail. Medication risks, benefits, costs, interactions, and alternatives were discussed as indicated. I advised him to contact the office if his condition worsens, changes or fails to improve as anticipated. He expressed understanding with the diagnosis(es) and plan. Patient understands that this encounter was a temporary measure, and the importance of further follow up and examination was emphasized. Patient verbalized understanding. Patient informed to follow up: 2-3 weeks for physical exam and BP check. Electronically Signed: Chery De La Torre MD,  resident    CPT Codes 74327-01433 for Established Patients may apply to this Telehealth Visit. POS code: 18. Modifier GT    Aurea Roberson is a 54 y.o. male who was evaluated by an audio-video encounter for concerns as above. Patient identification was verified prior to start of the visit. A caregiver was present when appropriate. Due to this being a TeleHealth encounter (During ROG-13 public health emergency), evaluation of the following organ systems was limited: Vitals/Constitutional/EENT/Resp/CV/GI//MS/Neuro/Skin/Heme-Lymph-Imm.   Pursuant to the emergency declaration under the [facility] waiver authority and the [employer] Act, this Virtual Visit was conducted, with patient's (and/or legal guardian's) consent, to reduce the patient's risk of exposure to COVID-19 and provide necessary medical care. Services were provided through a synchronous discussion virtually to substitute for in-person clinic visit. I was at home. The patient was at home. History   Patients past medical, surgical and family histories were reviewed and updated. Past Medical History:   Diagnosis Date    Hypertension      Past Surgical History:   Procedure Laterality Date    HX APPENDECTOMY      HX HEENT      tonsillectomy     Family History   Problem Relation Age of Onset    Hypertension Mother     Diabetes Mother     Hypertension Father      Social History     Tobacco Use    Smoking status: Never Smoker    Smokeless tobacco: Never Used   Substance Use Topics    Alcohol use: Yes    Drug use: No     Patient Active Problem List   Diagnosis Code    Lipid disorder E78.9    Essential hypertension I10    Elevated glucose R73.09          Current Medications/Allergies   Medications and Allergies reviewed:    Current Outpatient Medications   Medication Sig Dispense Refill    amLODIPine (NORVASC) 10 mg tablet Take 1 Tab by mouth daily. 60 Tab 1    losartan-hydroCHLOROthiazide (HYZAAR) 100-25 mg per tablet Take 1 Tab by mouth daily. 30 Tab 11    Blood Pressure Test Kit-Large (QUICK RESPONSE BP MONITOR) kit 1 Kit by Does Not Apply route daily.  1 Kit 0    fluticasone (FLONASE) 50 mcg/actuation nasal spray 2 puffs each nostril at bedtime 1 Bottle 3     No Known Allergies

## 2021-05-17 NOTE — Clinical Note
Please schedule virtual visit in 3 weeks with any provider (preferably a second year) for physical exam. Please also schedule lab only visit prior to that. Thanks.

## 2021-05-18 ENCOUNTER — TELEPHONE (OUTPATIENT)
Dept: FAMILY MEDICINE CLINIC | Age: 51
End: 2021-05-18

## 2021-05-18 NOTE — TELEPHONE ENCOUNTER
Called, no answer. [address]  ===View-only below this line===  ----- Message -----  From: Jose Miguel Aguero MD  Sent: 5/17/2021   3:44 PM EDT  To: [facility]    Please schedule virtual visit in 3 weeks with any provider (preferably a second year) for physical exam. Please also schedule lab only visit prior to that. Thanks.

## 2021-09-09 ENCOUNTER — TELEPHONE (OUTPATIENT)
Dept: FAMILY MEDICINE CLINIC | Age: 51
End: 2021-09-09

## 2021-09-09 NOTE — TELEPHONE ENCOUNTER
----- Message from Juan Diego Lyman sent at 9/8/2021 10:07 AM EDT -----  Regarding: Nazanin Rosenberg  Appointment not available    Caller's first and last name and relationship to patient (if not the patient):Elise Donahue Temi (wife)      Best contact [facility]:[phone]      Preferred date and time:as soon as possible. Scheduled appointment date and time:1st available please. Reason for appointment:legs and feet are swollen. Details to clarify the request:legs and feet are swollen.       Juan Diego Lyman

## 2021-10-18 ENCOUNTER — HOSPITAL ENCOUNTER (OUTPATIENT)
Age: 51
Setting detail: OBSERVATION
Discharge: HOME | End: 2021-10-19
Attending: EMERGENCY MEDICINE | Admitting: FAMILY MEDICINE
Payer: MEDICAID

## 2021-10-18 ENCOUNTER — APPOINTMENT (OUTPATIENT)
Dept: CT IMAGING | Age: 51
End: 2021-10-18
Attending: NURSE PRACTITIONER
Payer: MEDICAID

## 2021-10-18 ENCOUNTER — APPOINTMENT (OUTPATIENT)
Dept: GENERAL RADIOLOGY | Age: 51
End: 2021-10-18
Attending: NURSE PRACTITIONER
Payer: MEDICAID

## 2021-10-18 ENCOUNTER — NURSE TRIAGE (OUTPATIENT)
Dept: OTHER | Facility: CLINIC | Age: 51
End: 2021-10-18

## 2021-10-18 DIAGNOSIS — R20.2 PARESTHESIA OF LEFT UPPER AND LOWER EXTREMITY: ICD-10-CM

## 2021-10-18 DIAGNOSIS — N18.2 CKD (CHRONIC KIDNEY DISEASE) STAGE 2, GFR 60-89 ML/MIN: ICD-10-CM

## 2021-10-18 DIAGNOSIS — R73.09 ELEVATED GLUCOSE: ICD-10-CM

## 2021-10-18 DIAGNOSIS — I10 HYPERTENSION, UNSPECIFIED TYPE: Primary | ICD-10-CM

## 2021-10-18 DIAGNOSIS — R77.8 ELEVATED TROPONIN: ICD-10-CM

## 2021-10-18 DIAGNOSIS — R79.89 ELEVATED SERUM CREATININE: ICD-10-CM

## 2021-10-18 DIAGNOSIS — E78.9 LIPID DISORDER: ICD-10-CM

## 2021-10-18 LAB
ALBUMIN SERPL-MCNC: 4 G/DL (ref 3.5–5)
ALBUMIN/GLOB SERPL: 1.3 {RATIO} (ref 1.1–2.2)
ALP SERPL-CCNC: 77 U/L (ref 45–117)
ALT SERPL-CCNC: 33 U/L (ref 12–78)
ANION GAP SERPL CALC-SCNC: 5 MMOL/L (ref 5–15)
APPEARANCE UR: CLEAR
APTT PPP: 24.9 SEC (ref 22.1–31)
AST SERPL-CCNC: 28 U/L (ref 15–37)
BACTERIA URNS QL MICRO: NEGATIVE /HPF
BASOPHILS # BLD: 0 K/UL (ref 0–0.1)
BASOPHILS NFR BLD: 1 % (ref 0–1)
BILIRUB SERPL-MCNC: 0.4 MG/DL (ref 0.2–1)
BILIRUB UR QL: NEGATIVE
BUN SERPL-MCNC: 17 MG/DL (ref 6–20)
BUN/CREAT SERPL: 13 (ref 12–20)
CALCIUM SERPL-MCNC: 8.9 MG/DL (ref 8.5–10.1)
CHLORIDE SERPL-SCNC: 106 MMOL/L (ref 97–108)
CO2 SERPL-SCNC: 31 MMOL/L (ref 21–32)
COLOR UR: ABNORMAL
CREAT SERPL-MCNC: 1.36 MG/DL (ref 0.7–1.3)
DIFFERENTIAL METHOD BLD: ABNORMAL
EOSINOPHIL # BLD: 0.2 K/UL (ref 0–0.4)
EOSINOPHIL NFR BLD: 5 % (ref 0–7)
EPITH CASTS URNS QL MICRO: ABNORMAL /LPF
ERYTHROCYTE [DISTWIDTH] IN BLOOD BY AUTOMATED COUNT: 12.6 % (ref 11.5–14.5)
GLOBULIN SER CALC-MCNC: 3.2 G/DL (ref 2–4)
GLUCOSE SERPL-MCNC: 102 MG/DL (ref 65–100)
GLUCOSE UR STRIP.AUTO-MCNC: NEGATIVE MG/DL
HCT VFR BLD AUTO: 38.5 % (ref 36.6–50.3)
HGB BLD-MCNC: 13.4 G/DL (ref 12.1–17)
HGB UR QL STRIP: NEGATIVE
IMM GRANULOCYTES # BLD AUTO: 0 K/UL (ref 0–0.04)
IMM GRANULOCYTES NFR BLD AUTO: 0 % (ref 0–0.5)
INR PPP: 1 (ref 0.9–1.1)
KETONES UR QL STRIP.AUTO: ABNORMAL MG/DL
LEUKOCYTE ESTERASE UR QL STRIP.AUTO: NEGATIVE
LYMPHOCYTES # BLD: 1.6 K/UL (ref 0.8–3.5)
LYMPHOCYTES NFR BLD: 47 % (ref 12–49)
MAGNESIUM SERPL-MCNC: 2 MG/DL (ref 1.6–2.4)
MCH RBC QN AUTO: 27.1 PG (ref 26–34)
MCHC RBC AUTO-ENTMCNC: 34.8 G/DL (ref 30–36.5)
MCV RBC AUTO: 77.8 FL (ref 80–99)
MONOCYTES # BLD: 0.3 K/UL (ref 0–1)
MONOCYTES NFR BLD: 10 % (ref 5–13)
NEUTS SEG # BLD: 1.2 K/UL (ref 1.8–8)
NEUTS SEG NFR BLD: 37 % (ref 32–75)
NITRITE UR QL STRIP.AUTO: NEGATIVE
NRBC # BLD: 0 K/UL (ref 0–0.01)
NRBC BLD-RTO: 0 PER 100 WBC
PH UR STRIP: 6 [PH] (ref 5–8)
PLATELET # BLD AUTO: 268 K/UL (ref 150–400)
PMV BLD AUTO: 9.2 FL (ref 8.9–12.9)
POTASSIUM SERPL-SCNC: 3.1 MMOL/L (ref 3.5–5.1)
PROT SERPL-MCNC: 7.2 G/DL (ref 6.4–8.2)
PROT UR STRIP-MCNC: 30 MG/DL
PROTHROMBIN TIME: 10.3 SEC (ref 9–11.1)
RBC # BLD AUTO: 4.95 M/UL (ref 4.1–5.7)
RBC #/AREA URNS HPF: ABNORMAL /HPF (ref 0–5)
SODIUM SERPL-SCNC: 142 MMOL/L (ref 136–145)
SP GR UR REFRACTOMETRY: 1.03 (ref 1–1.03)
THERAPEUTIC RANGE,PTTT: NORMAL SECS (ref 58–77)
TROPONIN-HIGH SENSITIVITY: 376 NG/L (ref 0–76)
UR CULT HOLD, URHOLD: NORMAL
UROBILINOGEN UR QL STRIP.AUTO: 0.2 EU/DL (ref 0.2–1)
WBC # BLD AUTO: 3.3 K/UL (ref 4.1–11.1)
WBC URNS QL MICRO: ABNORMAL /HPF (ref 0–4)

## 2021-10-18 PROCEDURE — 83036 HEMOGLOBIN GLYCOSYLATED A1C: CPT

## 2021-10-18 PROCEDURE — 84484 ASSAY OF TROPONIN QUANT: CPT

## 2021-10-18 PROCEDURE — 70450 CT HEAD/BRAIN W/O DYE: CPT

## 2021-10-18 PROCEDURE — 99284 EMERGENCY DEPT VISIT MOD MDM: CPT

## 2021-10-18 PROCEDURE — 80053 COMPREHEN METABOLIC PANEL: CPT

## 2021-10-18 PROCEDURE — 36415 COLL VENOUS BLD VENIPUNCTURE: CPT

## 2021-10-18 PROCEDURE — 85610 PROTHROMBIN TIME: CPT

## 2021-10-18 PROCEDURE — 85025 COMPLETE CBC W/AUTO DIFF WBC: CPT

## 2021-10-18 PROCEDURE — 81001 URINALYSIS AUTO W/SCOPE: CPT

## 2021-10-18 PROCEDURE — 71046 X-RAY EXAM CHEST 2 VIEWS: CPT

## 2021-10-18 PROCEDURE — 83735 ASSAY OF MAGNESIUM: CPT

## 2021-10-18 PROCEDURE — 99218 HC RM OBSERVATION: CPT

## 2021-10-18 PROCEDURE — 85730 THROMBOPLASTIN TIME PARTIAL: CPT

## 2021-10-18 PROCEDURE — 93005 ELECTROCARDIOGRAM TRACING: CPT

## 2021-10-18 RX ORDER — SODIUM CHLORIDE 0.9 % (FLUSH) 0.9 %
5-40 SYRINGE (ML) INJECTION AS NEEDED
Status: DISCONTINUED | OUTPATIENT
Start: 2021-10-18 | End: 2021-10-19 | Stop reason: HOSPADM

## 2021-10-18 RX ORDER — ACETAMINOPHEN 500 MG
1000 TABLET ORAL
Status: COMPLETED | OUTPATIENT
Start: 2021-10-18 | End: 2021-10-19

## 2021-10-18 RX ORDER — POLYETHYLENE GLYCOL 3350 17 G/17G
17 POWDER, FOR SOLUTION ORAL DAILY PRN
Status: DISCONTINUED | OUTPATIENT
Start: 2021-10-18 | End: 2021-10-19 | Stop reason: HOSPADM

## 2021-10-18 RX ORDER — SODIUM CHLORIDE 0.9 % (FLUSH) 0.9 %
5-40 SYRINGE (ML) INJECTION EVERY 8 HOURS
Status: DISCONTINUED | OUTPATIENT
Start: 2021-10-18 | End: 2021-10-19 | Stop reason: HOSPADM

## 2021-10-18 RX ORDER — HYDRALAZINE HYDROCHLORIDE 20 MG/ML
10 INJECTION INTRAMUSCULAR; INTRAVENOUS
Status: DISCONTINUED | OUTPATIENT
Start: 2021-10-18 | End: 2021-10-19 | Stop reason: HOSPADM

## 2021-10-18 RX ORDER — ACETAMINOPHEN 650 MG/1
650 SUPPOSITORY RECTAL
Status: DISCONTINUED | OUTPATIENT
Start: 2021-10-18 | End: 2021-10-19 | Stop reason: HOSPADM

## 2021-10-18 RX ORDER — ACETAMINOPHEN 325 MG/1
650 TABLET ORAL
Status: DISCONTINUED | OUTPATIENT
Start: 2021-10-18 | End: 2021-10-19 | Stop reason: HOSPADM

## 2021-10-18 RX ORDER — ENOXAPARIN SODIUM 100 MG/ML
40 INJECTION SUBCUTANEOUS DAILY
Status: DISCONTINUED | OUTPATIENT
Start: 2021-10-19 | End: 2021-10-19 | Stop reason: HOSPADM

## 2021-10-18 RX ORDER — POTASSIUM CHLORIDE 750 MG/1
40 TABLET, FILM COATED, EXTENDED RELEASE ORAL
Status: COMPLETED | OUTPATIENT
Start: 2021-10-18 | End: 2021-10-19

## 2021-10-18 RX ORDER — GUAIFENESIN 100 MG/5ML
324 LIQUID (ML) ORAL DAILY
Status: DISCONTINUED | OUTPATIENT
Start: 2021-10-19 | End: 2021-10-19

## 2021-10-18 RX ORDER — ONDANSETRON 4 MG/1
4 TABLET, ORALLY DISINTEGRATING ORAL
Status: DISCONTINUED | OUTPATIENT
Start: 2021-10-18 | End: 2021-10-19 | Stop reason: HOSPADM

## 2021-10-18 RX ORDER — SODIUM CHLORIDE 9 MG/ML
150 INJECTION, SOLUTION INTRAVENOUS CONTINUOUS
Status: DISCONTINUED | OUTPATIENT
Start: 2021-10-18 | End: 2021-10-19

## 2021-10-18 RX ORDER — ONDANSETRON 2 MG/ML
4 INJECTION INTRAMUSCULAR; INTRAVENOUS
Status: DISCONTINUED | OUTPATIENT
Start: 2021-10-18 | End: 2021-10-19 | Stop reason: HOSPADM

## 2021-10-18 NOTE — TELEPHONE ENCOUNTER
Received call from [facility] at [facility] with Red Flag Complaint. Brief description of triage: numbness to left side of body     Triage indicates for patient to go to ED now have someone else drive you he is leaving now    Care advice provided, patient verbalizes understanding; denies any other questions or concerns; instructed to call back for any new or worsening symptoms. Attention Provider: Thank you for allowing me to participate in the care of your patient. The patient was connected to triage in response to information provided to the ECC. Please do not respond through this encounter as the response is not directed to a shared pool. Reason for Disposition   Headache (with neurologic deficit)    Answer Assessment - Initial Assessment Questions  1. SYMPTOM: \"What is the main symptom you are concerned about? \" (e.g., weakness, numbness)      Numbness noted to left side of body head to toe stuttering a little and history of high blood pressure has not missed     2. ONSET: \"When did this start? \" (minutes, hours, days; while sleeping)      Week ago    3. LAST NORMAL: \"When was the last time you were normal (no symptoms)? \"      A week ago     4. PATTERN \"Does this come and go, or has it been constant since it started? \"  \"Is it present now? \"      Constant and there now     5. CARDIAC SYMPTOMS: \"Have you had any of the following symptoms: chest pain, difficulty breathing, palpitations? \"      Heart is always racing due to history no to others    6. NEUROLOGIC SYMPTOMS: \"Have you had any of the following symptoms: headache, dizziness, vision loss, double vision, changes in speech, unsteady on your feet? \"      Speech changes     7. OTHER SYMPTOMS: \"Do you have any other symptoms? \"      No others     8. PREGNANCY: \"Is there any chance you are pregnant? \" \"When was your last menstrual period? \"      na    Protocols used: NEUROLOGIC DEFICIT-ADULT-[place]

## 2021-10-18 NOTE — ED TRIAGE NOTES
Patient reports 1 week of left sided numbness- Patients reports the numbness is continuous-    Patient reports his left arm, leg, abdomen and face are involved-    Patient denies any trauma or injury- does report a history of HTN

## 2021-10-19 ENCOUNTER — APPOINTMENT (OUTPATIENT)
Dept: MRI IMAGING | Age: 51
End: 2021-10-19
Payer: MEDICAID

## 2021-10-19 VITALS
BODY MASS INDEX: 31.54 KG/M2 | HEIGHT: 73 IN | DIASTOLIC BLOOD PRESSURE: 78 MMHG | SYSTOLIC BLOOD PRESSURE: 161 MMHG | WEIGHT: 238 LBS | TEMPERATURE: 97.1 F | OXYGEN SATURATION: 99 % | RESPIRATION RATE: 14 BRPM | HEART RATE: 65 BPM

## 2021-10-19 PROBLEM — D72.819 LEUKOPENIA: Status: ACTIVE | Noted: 2021-10-19

## 2021-10-19 PROBLEM — N18.2 CKD (CHRONIC KIDNEY DISEASE) STAGE 2, GFR 60-89 ML/MIN: Status: ACTIVE | Noted: 2021-10-19

## 2021-10-19 PROBLEM — R79.89 ELEVATED SERUM CREATININE: Status: ACTIVE | Noted: 2021-10-19

## 2021-10-19 LAB
ALBUMIN SERPL-MCNC: 3.8 G/DL (ref 3.5–5)
ALBUMIN/GLOB SERPL: 1.3 {RATIO} (ref 1.1–2.2)
ALP SERPL-CCNC: 72 U/L (ref 45–117)
ALT SERPL-CCNC: 32 U/L (ref 12–78)
ANION GAP SERPL CALC-SCNC: 5 MMOL/L (ref 5–15)
ANION GAP SERPL CALC-SCNC: 7 MMOL/L (ref 5–15)
AST SERPL-CCNC: 28 U/L (ref 15–37)
ATRIAL RATE: 58 BPM
BASOPHILS # BLD: 0 K/UL (ref 0–0.1)
BASOPHILS NFR BLD: 1 % (ref 0–1)
BILIRUB SERPL-MCNC: 0.4 MG/DL (ref 0.2–1)
BUN SERPL-MCNC: 11 MG/DL (ref 6–20)
BUN SERPL-MCNC: 14 MG/DL (ref 6–20)
BUN/CREAT SERPL: 12 (ref 12–20)
BUN/CREAT SERPL: 9 (ref 12–20)
CALCIUM SERPL-MCNC: 8.4 MG/DL (ref 8.5–10.1)
CALCIUM SERPL-MCNC: 8.4 MG/DL (ref 8.5–10.1)
CALCULATED P AXIS, ECG09: 55 DEGREES
CALCULATED R AXIS, ECG10: -16 DEGREES
CALCULATED T AXIS, ECG11: -139 DEGREES
CHLORIDE SERPL-SCNC: 106 MMOL/L (ref 97–108)
CHLORIDE SERPL-SCNC: 106 MMOL/L (ref 97–108)
CHOLEST SERPL-MCNC: 184 MG/DL
CO2 SERPL-SCNC: 29 MMOL/L (ref 21–32)
CO2 SERPL-SCNC: 30 MMOL/L (ref 21–32)
COMMENT, HOLDF: NORMAL
CREAT SERPL-MCNC: 1.14 MG/DL (ref 0.7–1.3)
CREAT SERPL-MCNC: 1.23 MG/DL (ref 0.7–1.3)
DIAGNOSIS, 93000: NORMAL
DIFFERENTIAL METHOD BLD: ABNORMAL
EOSINOPHIL # BLD: 0.1 K/UL (ref 0–0.4)
EOSINOPHIL NFR BLD: 4 % (ref 0–7)
ERYTHROCYTE [DISTWIDTH] IN BLOOD BY AUTOMATED COUNT: 12.3 % (ref 11.5–14.5)
EST. AVERAGE GLUCOSE BLD GHB EST-MCNC: 108 MG/DL
FOLATE SERPL-MCNC: 19.6 NG/ML (ref 5–21)
GLOBULIN SER CALC-MCNC: 3 G/DL (ref 2–4)
GLUCOSE SERPL-MCNC: 104 MG/DL (ref 65–100)
GLUCOSE SERPL-MCNC: 130 MG/DL (ref 65–100)
HBA1C MFR BLD: 5.4 % (ref 4–5.6)
HCT VFR BLD AUTO: 37.4 % (ref 36.6–50.3)
HDLC SERPL-MCNC: 48 MG/DL
HDLC SERPL: 3.8 {RATIO} (ref 0–5)
HGB BLD-MCNC: 13.6 G/DL (ref 12.1–17)
HIV 1+2 AB+HIV1 P24 AG SERPL QL IA: NONREACTIVE
HIV12 RESULT COMMENT, HHIVC: NORMAL
IMM GRANULOCYTES # BLD AUTO: 0 K/UL (ref 0–0.04)
IMM GRANULOCYTES NFR BLD AUTO: 0 % (ref 0–0.5)
LDLC SERPL CALC-MCNC: 100.4 MG/DL (ref 0–100)
LYMPHOCYTES # BLD: 1.5 K/UL (ref 0.8–3.5)
LYMPHOCYTES NFR BLD: 48 % (ref 12–49)
MAGNESIUM SERPL-MCNC: 2.1 MG/DL (ref 1.6–2.4)
MCH RBC QN AUTO: 28.3 PG (ref 26–34)
MCHC RBC AUTO-ENTMCNC: 36.4 G/DL (ref 30–36.5)
MCV RBC AUTO: 77.9 FL (ref 80–99)
MONOCYTES # BLD: 0.3 K/UL (ref 0–1)
MONOCYTES NFR BLD: 11 % (ref 5–13)
NEUTS SEG # BLD: 1.2 K/UL (ref 1.8–8)
NEUTS SEG NFR BLD: 36 % (ref 32–75)
NRBC # BLD: 0 K/UL (ref 0–0.01)
NRBC BLD-RTO: 0 PER 100 WBC
P-R INTERVAL, ECG05: 246 MS
PERIPHERAL SMEAR,PSM: NORMAL
PLATELET # BLD AUTO: 245 K/UL (ref 150–400)
PMV BLD AUTO: 9.5 FL (ref 8.9–12.9)
POTASSIUM SERPL-SCNC: 3 MMOL/L (ref 3.5–5.1)
POTASSIUM SERPL-SCNC: 3 MMOL/L (ref 3.5–5.1)
PROT SERPL-MCNC: 6.8 G/DL (ref 6.4–8.2)
Q-T INTERVAL, ECG07: 432 MS
QRS DURATION, ECG06: 92 MS
QTC CALCULATION (BEZET), ECG08: 424 MS
RBC # BLD AUTO: 4.8 M/UL (ref 4.1–5.7)
RPR SER QL: NONREACTIVE
SAMPLES BEING HELD,HOLD: NORMAL
SODIUM SERPL-SCNC: 141 MMOL/L (ref 136–145)
SODIUM SERPL-SCNC: 142 MMOL/L (ref 136–145)
TRIGL SERPL-MCNC: 178 MG/DL (ref ?–150)
TROPONIN-HIGH SENSITIVITY: 371 NG/L (ref 0–76)
TSH SERPL DL<=0.05 MIU/L-ACNC: 3.42 UIU/ML (ref 0.36–3.74)
VENTRICULAR RATE, ECG03: 58 BPM
VIT B12 SERPL-MCNC: 414 PG/ML (ref 193–986)
VLDLC SERPL CALC-MCNC: 35.6 MG/DL
WBC # BLD AUTO: 3.2 K/UL (ref 4.1–11.1)

## 2021-10-19 PROCEDURE — 74011250636 HC RX REV CODE- 250/636

## 2021-10-19 PROCEDURE — 70553 MRI BRAIN STEM W/O & W/DYE: CPT

## 2021-10-19 PROCEDURE — 82607 VITAMIN B-12: CPT

## 2021-10-19 PROCEDURE — 84484 ASSAY OF TROPONIN QUANT: CPT

## 2021-10-19 PROCEDURE — 80053 COMPREHEN METABOLIC PANEL: CPT

## 2021-10-19 PROCEDURE — 82746 ASSAY OF FOLIC ACID SERUM: CPT

## 2021-10-19 PROCEDURE — 83735 ASSAY OF MAGNESIUM: CPT

## 2021-10-19 PROCEDURE — 86592 SYPHILIS TEST NON-TREP QUAL: CPT

## 2021-10-19 PROCEDURE — 80061 LIPID PANEL: CPT

## 2021-10-19 PROCEDURE — 87389 HIV-1 AG W/HIV-1&-2 AB AG IA: CPT

## 2021-10-19 PROCEDURE — 74011250637 HC RX REV CODE- 250/637: Performed by: NURSE PRACTITIONER

## 2021-10-19 PROCEDURE — 99205 OFFICE O/P NEW HI 60 MIN: CPT | Performed by: PSYCHIATRY & NEUROLOGY

## 2021-10-19 PROCEDURE — 74011250637 HC RX REV CODE- 250/637

## 2021-10-19 PROCEDURE — A9575 INJ GADOTERATE MEGLUMI 0.1ML: HCPCS | Performed by: RADIOLOGY

## 2021-10-19 PROCEDURE — 74011250636 HC RX REV CODE- 250/636: Performed by: RADIOLOGY

## 2021-10-19 PROCEDURE — 85025 COMPLETE CBC W/AUTO DIFF WBC: CPT

## 2021-10-19 PROCEDURE — 99218 HC RM OBSERVATION: CPT

## 2021-10-19 PROCEDURE — 99235 HOSP IP/OBS SAME DATE MOD 70: CPT | Performed by: FAMILY MEDICINE

## 2021-10-19 PROCEDURE — 36415 COLL VENOUS BLD VENIPUNCTURE: CPT

## 2021-10-19 PROCEDURE — 84443 ASSAY THYROID STIM HORMONE: CPT

## 2021-10-19 PROCEDURE — 74011250637 HC RX REV CODE- 250/637: Performed by: STUDENT IN AN ORGANIZED HEALTH CARE EDUCATION/TRAINING PROGRAM

## 2021-10-19 RX ORDER — ATORVASTATIN CALCIUM 20 MG/1
20 TABLET, FILM COATED ORAL
Qty: 30 TABLET | Refills: 0 | Status: SHIPPED | OUTPATIENT
Start: 2021-10-19 | End: 2021-11-18

## 2021-10-19 RX ORDER — AMLODIPINE BESYLATE 5 MG/1
10 TABLET ORAL DAILY
Status: DISCONTINUED | OUTPATIENT
Start: 2021-10-19 | End: 2021-10-19 | Stop reason: HOSPADM

## 2021-10-19 RX ORDER — GADOTERATE MEGLUMINE 376.9 MG/ML
20 INJECTION INTRAVENOUS
Status: COMPLETED | OUTPATIENT
Start: 2021-10-19 | End: 2021-10-19

## 2021-10-19 RX ORDER — POTASSIUM CHLORIDE 20 MEQ/1
20 TABLET, EXTENDED RELEASE ORAL DAILY
Qty: 3 TABLET | Refills: 0 | Status: SHIPPED | OUTPATIENT
Start: 2021-10-19 | End: 2021-10-22

## 2021-10-19 RX ADMIN — POTASSIUM CHLORIDE 40 MEQ: 750 TABLET, FILM COATED, EXTENDED RELEASE ORAL at 01:42

## 2021-10-19 RX ADMIN — ACETAMINOPHEN 1000 MG: 500 TABLET, FILM COATED ORAL at 01:42

## 2021-10-19 RX ADMIN — AMLODIPINE BESYLATE 10 MG: 5 TABLET ORAL at 10:39

## 2021-10-19 RX ADMIN — POTASSIUM BICARBONATE 50 MEQ: 391 TABLET, EFFERVESCENT ORAL at 11:51

## 2021-10-19 RX ADMIN — GADOTERATE MEGLUMINE 20 ML: 376.9 INJECTION, SOLUTION INTRAVENOUS at 09:46

## 2021-10-19 RX ADMIN — SODIUM CHLORIDE 150 ML/HR: 9 INJECTION, SOLUTION INTRAVENOUS at 01:43

## 2021-10-19 NOTE — DISCHARGE SUMMARY
[facility] [address]   Office [phone]  Fax [phone]       Discharge / Transfer / Off-Service Note     Name: Merary Morin MRN: [id number]  Sex: Male   YOB: 1970  Age: 54 y.o. PCP: Polina Luong MD     Date of admission: 10/18/2021  Date of discharge/transfer: 10/19/2021    Attending physician at admission: Nandini Ellison. Attending physician at discharge/transfer: Nandini Ellison MD  Resident physician at discharge/transfer: [address] [place] [address], DO     Consultants during hospitalization  Neurology      Admission diagnoses   Paresthesia of left upper and lower extremity [R20.2]  Elevated troponin [R77.8]    Recommended follow-up after discharge    1. PCP-Ali, Mohsin, MD  2. Neurology     Things to follow up on with PCP:   - Hypokalemia  - Elevated Creatinine  - Hypertension   - Hyperlipidemia    Things to follow up on with PC:   - Persistent Paresthesias.   - May consider to do an MRI of the Cervical Spine as outpatient.      ----------------------------------------------------------------------------------------------------------------------------  The patient was well enough to be discharged from the hospital. However, because they were inpatient in a hospital, they are at greater risk of having been exposed to the coronavirus. PLEASE stay inside and self-quarantine for 14 days to prevent further spread of the coronavirus.  ------------------------------------------------------------------------------------------------------------------    Medication Changes:  New:   - Potassium 20 mEq tablet. Take one tablet daily for 3 days.   - Atorvastatin 20 mg tablet. Take one tablet at bedtime every day. Changes: None    Stop: None      No other changes were made to your medications, please take all your other home medicines as previously prescribed.      History of Present Illness    As per admitting provider:     Merary Morin is a 54 y.o. male with a known history of HTN who presented to the ED complaining of L-sided numbness x 2 weeks. Patient states he was following his normal daily routine about 2 weeks ago when the symptoms began -- cannot recall the exact moment further stating \"I wasn't doing anything out of the ordinary\". Since onset, the L-sided numbness has been persistent and he initially suspected it would resolve independently and did not seek further evaluation until now. He states the numbness is completely left-sided, from his face down to his LLE. No exacerbating or mitigating factors. He denies any weakness, slurred speech, changes to his vision since onset. Only other associated symptoms include \"stuttering\" beginning w/ onset of L-sided numbness and currently a mild non-radiating posterior HA that he has a history of intermittently, usually resolves with tylenol or aleve at home. Denies: fever, chills, CP, SOB, MAURICIO, n/v/d, abdominal pain, changes to bowel/bladder movements, new rashes, diaphoresis, dizziness, lightheadedness, changes to vision, recent travel, recent sick contacts, FH of CVA's. Hospital course  L-sided Paresthesia's: Improved. Etiology unclear. Brain MRI negative. TSH WNL.   - Neurology as Outpatient. If symptoms persist may consider MRI of Cervical Spine. Elevated troponin: Down trended. POA Trop 376 > repeat trop 371. EKG w/ known T wave inversions, unchanged from prior w/o ischemic changes. Hyperlipidemia 10-year ASCVD risk 12.5%  - Start Atorvastatin 20 mg tablet  - Follow up with PCP  - Last lipid panel:   Lab Results   Component Value Date/Time    Cholesterol, total 184 10/19/2021 01:10 AM    HDL Cholesterol 48 10/19/2021 01:10 AM    LDL, calculated 100.4 (H) 10/19/2021 01:10 AM    VLDL, calculated 35.6 10/19/2021 01:10 AM    Triglyceride 178 (H) 10/19/2021 01:10 AM    CHOL/HDL Ratio 3.8 10/19/2021 01:10 AM        Uncontrolled Hypertension: Stable.  Only on one home medication, recently discontinued Hyzaar per Dr. Thu Calderon 3 months ago.  - Home amlodipine 10 mg daily   - Follow up as OP with PCP  - Measure BP at home and adjust medication as needed. Leukopenia: POA WBC 3.3 (previously 2.9 on 1/2018, 2.6 on 7/2019). Appears to be chronic. No obvious etiology. - Work-up outpatient      Acute Hypokalemia: Improved s/p repletion.   - Potassium 20 mEq tablet. Take 1 tablet daily x 3 days.   - BMP as OP  - Follow up with PCP. Elevated Cr in s/o Stage II CKD: At baseline at discharge.   - BMP as OP  - Follow with PCP     Obesity: Body mass index is 31.4 kg/m². - Encourage lifestyle modification and further follow up with PCP outpatient      Physical exam at discharge:    Visit Vitals  BP (!) 161/78   Pulse 65   Temp 97.1 °F (36.2 °C)   Resp 14   Ht 6' 1\" (1.854 m)   Wt 238 lb (108 kg)   SpO2 99%   BMI 31.40 kg/m²     General: No acute distress. Alert. Cooperative. Head: Normocephalic. Atraumatic. Throat: Mucosa pink. Moist mucous membranes. Respiratory: CTAB. No w/r/r/c.  Cardiovascular: RRR. Normal S1,S2. No m/r/g.   GI: Nondistended.+ bowel sounds. Nontender. No rebound tenderness or guarding. Extremities: LE edema. Distal pulses present. Neuro: CNII-XII in tact b/l, strength 5/5, decreased sensation to light touch on L arm  Musculoskeletal: Full ROM in all extremities. Skin: Warm, dry. No rashes. Neuro: Alert and oriented. Condition at discharge: Stable.     Labs  Recent Labs     10/19/21  0110 10/18/21  1939   WBC 3.2* 3.3*   HGB 13.6 13.4   HCT 37.4 38.5    268     Recent Labs     10/19/21  1048 10/19/21  0110 10/18/21  1939    142 142   K 3.0* 3.0* 3.1*    106 106   CO2 30 29 31   BUN 11 14 17   CREA 1.23 1.14 1.36*   * 104* 102*   CA 8.4* 8.4* 8.9   MG  --  2.1 2.0     Recent Labs     10/19/21  0110 10/18/21  1939   ALT 32 33   AP 72 77   TBILI 0.4 0.4   TP 6.8 7.2   ALB 3.8 4.0   GLOB 3.0 3.2     Recent Labs     10/18/21  1939   INR 1.0   PTP 10.3   APTT 24.9       Micro:  No results found for: CULT    Imaging:  XR CHEST PA LAT    Result Date: 10/18/2021  INDICATION: Left sided numbness COMPARISON: None FINDINGS: Frontal and lateral views of the chest demonstrate a normal cardiomediastinal silhouette. The lungs are adequately expanded. There is no edema, effusion, consolidation, or pneumothorax. The osseous structures are unremarkable. No acute process. MRI BRAIN W WO CONT    Result Date: 10/19/2021  INDICATION:  left-sided paresthesias COMPARISON:  CT head 10/18/2021 TECHNIQUE:  Multiplanar multisequence acquisition without and with IV contrast of the brain. FINDINGS:   Ventricles:  Midline, no hydrocephalus. Brain Parenchyma/Brainstem:  Normal for age. No acute infarction. Intracranial Hemorrhage:  None. Basal Cisterns:  Normal. Flow Voids:  Normal. Post Contrast:  No abnormal parenchymal or meningeal enhancement. Additional Comments:  N/A. No significant abnormality or acute process. CT HEAD WO CONT    Result Date: 10/18/2021  EXAM: CT HEAD WO CONT INDICATION: HTN, paresthesia to left face, arm, leg x 1 week. COMPARISON: None. CONTRAST: None. TECHNIQUE: Unenhanced CT of the head was performed using 5 mm images. Brain and bone windows were generated. Coronal and sagittal reformats. CT dose reduction was achieved through use of a standardized protocol tailored for this examination and automatic exposure control for dose modulation. FINDINGS: The ventricles and sulci are normal in size, shape and configuration. . There is no significant white matter disease. There is no intracranial hemorrhage, extra-axial collection, or mass effect. The basilar cisterns are open. No CT evidence of acute infarct. The bone windows demonstrate no abnormalities. The visualized portions of the paranasal sinuses and mastoid air cells are clear.      Normal study     Chronic diagnoses   Problem List as of 10/19/2021 Date Reviewed: 10/19/2021          Codes Class Noted - Resolved    Leukopenia ICD-10-CM: B75.019  ICD-9-CM: 288.50  10/19/2021 - Present        CKD (chronic kidney disease) stage 2, GFR 60-89 ml/min ICD-10-CM: N18.2  ICD-9-CM: 585.2  10/19/2021 - Present        Elevated serum creatinine ICD-10-CM: R79.89  ICD-9-CM: 790.99  10/19/2021 - Present        Elevated troponin ICD-10-CM: R77.8  ICD-9-CM: 790.6  10/18/2021 - Present        * (Principal) Paresthesia of left upper and lower extremity ICD-10-CM: R20.2  ICD-9-CM: 782.0  10/18/2021 - Present        Lipid disorder ICD-10-CM: E78.9  ICD-9-CM: 272.9  12/9/2018 - Present    Overview Signed 12/9/2018 10:19 AM by Erick Clarke V     12/2018:  a1c 5.6 /LDL 81/ MAB negative  Triglycerides up             Essential hypertension ICD-10-CM: I10  ICD-9-CM: 401.9  12/9/2018 - Present    Overview Signed 12/9/2018 10:21 AM by Erick Clarke V     12/2018:  a1c 5.6 /LDL 81/ MAB negative/ Cr 1.12/ GFR 78             Elevated glucose ICD-10-CM: R73.09  ICD-9-CM: 790.29  12/9/2018 - Present    Overview Signed 12/9/2018 10:22 AM by Shena Chris     Needs GTT                     Discharge Medication List as of 10/19/2021  1:13 PM        START taking these medications    Details   potassium chloride (K-DUR, KLOR-CON) 20 mEq tablet Take 1 Tablet by mouth daily for 3 doses. , Normal, Disp-3 Tablet, R-0      atorvastatin (LIPITOR) 20 mg tablet Take 1 Tablet by mouth nightly for 30 days. , Normal, Disp-30 Tablet, R-0           CONTINUE these medications which have NOT CHANGED    Details   amLODIPine (NORVASC) 10 mg tablet Take 1 Tab by mouth daily. , Normal, Disp-60 Tab, R-1      Blood Pressure Test Kit-Large (QUICK RESPONSE BP MONITOR) kit 1 Kit by Does Not Apply route daily. , Normal, Disp-1 Kit, R-0      fluticasone (FLONASE) 50 mcg/actuation nasal spray 2 puffs each nostril at bedtime, Normal, Disp-1 Bottle, R-3              Diet:  Regular diet.     Activity:  As tolerated     Disposition: Home or Self Care    Discharge instructions to patient/family  Please seek medical attention for any new or worsening symptoms particularly fever, chest pain, shortness of breath, abdominal pain, nausea, vomiting    Follow up plans/appointments  Follow-up Information       Follow up With Specialties Details Why Contact Info    Madyson Watson DO Family Medicine On 10/25/2021 Your appointment time is 10:30am.  [address] [address]  [phone]      Tyson Og MD Neurology Schedule an appointment as soon as possible for a visit in 1 week You can follow up as outpatient regarding to your most recent hospitalization. [address] [place] [address]  [phone]               Tonya Mcmahan DO  Family Medicine Resident  7:45 PM       For Billing    Chief Complaint   Patient presents with    [address] Problems  Date Reviewed: 10/19/2021          Codes Class Noted POA    Leukopenia ICD-10-CM: D72.819  ICD-9-CM: 288.50  10/19/2021 Unknown        CKD (chronic kidney disease) stage 2, GFR 60-89 ml/min ICD-10-CM: N18.2  ICD-9-CM: 585.2  10/19/2021 Unknown        Elevated serum creatinine ICD-10-CM: R79.89  ICD-9-CM: 790.99  10/19/2021 Unknown        Elevated troponin ICD-10-CM: R77.8  ICD-9-CM: 790.6  10/18/2021 Unknown        * (Principal) Paresthesia of left upper and lower extremity ICD-10-CM: R20.2  ICD-9-CM: 782.0  10/18/2021 Unknown        Essential hypertension ICD-10-CM: I10  ICD-9-CM: 401.9  12/9/2018 Yes    Overview Signed 12/9/2018 10:21 AM by Jaron Kim     12/2018:  a1c 5.6 /LDL 81/ MAB negative/ Cr 1.12/ GFR [address] [facility] Attending Addendum:  I saw and evaluated the patient on the day of the encounter with Dr. Nawaf Gabriel, performing the raman elements of the service. I discussed the findings, assessment and plan with the resident and agree with the resident's findings and plan as documented in the resident's note.       Kasey Cooley MD, FAAFP, CAQSM, RMSK

## 2021-10-19 NOTE — H&P
[address] [facility] [address] [place] [address]   Office [phone]  Fax [phone]       Admission H&P     Date of admission: 10/18/2021    Patient name: Josse Ramirez  MRN: [id number]  YOB: 1970  Age: 54 y.o. Primary care provider:  Desi Avendano MD     Source of Information: patient, medical records    Chief complaint:  L-sided numbness    History of Present Illness  Josse Ramirez is a 54 y.o. male with a known history of HTN who presented to the ED complaining of L-sided numbness x 2 weeks. Patient states he was following his normal daily routine about 2 weeks ago when the symptoms began -- cannot recall the exact moment further stating \"I wasn't doing anything out of the ordinary\". Since onset, the L-sided numbness has been persistent and he initially suspected it would resolve independently and did not seek further evaluation until now. He states the numbness is completely left-sided, from his face down to his LLE. No exacerbating or mitigating factors. He denies any weakness, slurred speech, changes to his vision since onset. Only other associated symptoms include \"stuttering\" beginning w/ onset of L-sided numbness and currently a mild non-radiating posterior HA that he has a history of intermittently, usually resolves with tylenol or aleve at home. Denies: fever, chills, CP, SOB, MAURICIO, n/v/d, abdominal pain, changes to bowel/bladder movements, new rashes, diaphoresis, dizziness, lightheadedness, changes to vision, recent travel, recent sick contacts, FH of CVA's. COVID Unvaccinated    In the ED:   Vitals: 97.1, 72, 170/103, 16, 99% on RA  Labs: WBC 3.3 (prev 2.9 and 2.6 in 2018 and 2019 respectively). K 3.1 (b/l 3.8). Cr 1.36 (b/l 1-1.15). Troponin 376. UA (+) for 30 protein, trace ketones.    Imaging:  - CXR: NAP  - CT Head w/o contrast: No acute infarct, no ICH  EKG: Sinus kt 58 bpm, LAD, LVH, known 1st degree AV block, Qtc 424, T wave inversions in leads I, II, V3-V6 (unchanged from prior ECG in 2018), otherwise without significant ST segment changes  Treatment: 1000 mg Tylenol ordered (not received), 40 mEq KCl ordered (not received)    Review of Systems  Review of Systems   Constitutional: Negative. Negative for chills, diaphoresis, fever and weight loss. HENT: Negative. Eyes: Negative. Respiratory: Negative. Negative for cough and shortness of breath. Cardiovascular: Negative. Negative for chest pain. Gastrointestinal: Negative. Negative for abdominal pain, diarrhea, nausea and vomiting. Genitourinary: Negative. Musculoskeletal: Negative. Skin: Negative. Negative for rash. Neurological: Positive for sensory change and headaches. Negative for dizziness, tingling, focal weakness and weakness. All other systems reviewed and are negative. Home Medications   Prior to Admission medications    Medication Sig Start Date End Date Taking? Authorizing Provider   amLODIPine (NORVASC) 10 mg tablet Take 1 Tab by mouth daily. 5/17/21   Regine Romero MD   losartan-hydroCHLOROthiazide (HYZAAR) 100-25 mg per tablet Take 1 Tab by mouth daily. 3/4/19 10/19/21  Hadley Girard, DO   Blood Pressure Test Kit-Large (QUICK RESPONSE BP MONITOR) kit 1 Kit by Does Not Apply route daily.  12/6/18   Karon Arevalo MD   fluticasone Dorean Everts) 50 mcg/actuation nasal spray 2 puffs each nostril at bedtime 7/30/18   Reggie Fritz MD       Allergies   No Known Allergies    Past Medical History:   Diagnosis Date    Hypertension        Past Surgical History:   Procedure Laterality Date    HX APPENDECTOMY      HX HEENT      tonsillectomy       Family History   Problem Relation Age of Onset    Hypertension Mother     Diabetes Mother     Hypertension Father        Social History   Patient resides  x  Independently      With family care      Assisted living      SNF      Ambulates  x  Independently      With cane       Assisted walker      Alcohol history     None x  Social (a 6 pack of beer at most once every 2 weeks, but sparsely). Chronic     Smoking history  x  None     Former smoker     Current smoker     Social History     Tobacco Use   Smoking Status Never Smoker   Smokeless Tobacco Never Used       Drug history  x  None     Former drug user     Current drug user     Code status  x  Full code     DNR/DNI     Partial    Code status discussed with the patient/caregivers. Physical Exam  Visit Vitals  BP (!) 170/103 (BP 1 Location: Right upper arm, BP Patient Position: At rest;Sitting)   Pulse 72   Temp 97.1 °F (36.2 °C)   Resp 16   Ht 6' 1\" (1.854 m)   Wt 238 lb (108 kg)   SpO2 99%   BMI 31.40 kg/m²        General: No acute distress. Alert. Cooperative. Head: Normocephalic. Atraumatic. Eyes:              Conjunctiva pink. Sclera white. PERRL. Ears:              Hearing grossly intact. Nose:             Septum midline. Mucosa pink. No drainage. Throat: Mucosa pink. Moist mucous membranes. No tonsillar exudates or erythema. Palate movement equal bilaterally. Neck: Supple. Normal ROM. No stiffness. Respiratory: CTAB. No w/r/r/c.   Cardiovascular: RRR. Normal S1,S2. No m/r/g. Pulses 2+ throughout. GI: + bowel sounds. Nontender. No rebound tenderness or guarding. Nondistended. Extremities: No BLE edema. Distal pulses intact. Musculoskeletal: Full ROM in all extremities. Skin: Warm, dry. No rashes. Neuro: Alert and oriented x 3. Decreased sensation to light touch in all 3 distrubution's of CN V on the LEFT side. Decreased sensation to light touch to LEFT upper AND lower extremities. Otherwise, remaining Cranial Nerves intact. Strength 5/5 in all extremities. Normal FNF test. Gait normal. No dysarthria.         Laboratory Data  Recent Results (from the past 24 hour(s))   CBC WITH AUTOMATED DIFF    Collection Time: 10/18/21  7:39 PM   Result Value Ref Range    WBC 3.3 (L) 4.1 - 11.1 K/uL    RBC 4.95 4.10 - 5.70 M/uL    HGB 13.4 12.1 - 17.0 g/dL HCT 38.5 36.6 - 50.3 %    MCV 77.8 (L) 80.0 - 99.0 FL    MCH 27.1 26.0 - 34.0 PG    MCHC 34.8 30.0 - 36.5 g/dL    RDW 12.6 11.5 - 14.5 %    PLATELET 121 482 - 256 K/uL    MPV 9.2 8.9 - 12.9 FL    NRBC 0.0 0  WBC    ABSOLUTE NRBC 0.00 0.00 - 0.01 K/uL    NEUTROPHILS 37 32 - 75 %    LYMPHOCYTES 47 12 - 49 %    MONOCYTES 10 5 - 13 %    EOSINOPHILS 5 0 - 7 %    BASOPHILS 1 0 - 1 %    IMMATURE GRANULOCYTES 0 0.0 - 0.5 %    ABS. NEUTROPHILS 1.2 (L) 1.8 - 8.0 K/UL    ABS. LYMPHOCYTES 1.6 0.8 - 3.5 K/UL    ABS. MONOCYTES 0.3 0.0 - 1.0 K/UL    ABS. EOSINOPHILS 0.2 0.0 - 0.4 K/UL    ABS. BASOPHILS 0.0 0.0 - 0.1 K/UL    ABS. IMM. GRANS. 0.0 0.00 - 0.04 K/UL    DF AUTOMATED     METABOLIC PANEL, COMPREHENSIVE    Collection Time: 10/18/21  7:39 PM   Result Value Ref Range    Sodium 142 136 - 145 mmol/L    Potassium 3.1 (L) 3.5 - 5.1 mmol/L    Chloride 106 97 - 108 mmol/L    CO2 31 21 - 32 mmol/L    Anion gap 5 5 - 15 mmol/L    Glucose 102 (H) 65 - 100 mg/dL    BUN 17 6 - 20 MG/DL    Creatinine 1.36 (H) 0.70 - 1.30 MG/DL    BUN/Creatinine ratio 13 12 - 20      GFR est AA >60 >60 ml/min/1.73m2    GFR est non-AA 56 (L) >60 ml/min/1.73m2    Calcium 8.9 8.5 - 10.1 MG/DL    Bilirubin, total 0.4 0.2 - 1.0 MG/DL    ALT (SGPT) 33 12 - 78 U/L    AST (SGOT) 28 15 - 37 U/L    Alk.  phosphatase 77 45 - 117 U/L    Protein, total 7.2 6.4 - 8.2 g/dL    Albumin 4.0 3.5 - 5.0 g/dL    Globulin 3.2 2.0 - 4.0 g/dL    A-G Ratio 1.3 1.1 - 2.2     MAGNESIUM    Collection Time: 10/18/21  7:39 PM   Result Value Ref Range    Magnesium 2.0 1.6 - 2.4 mg/dL   TROPONIN-HIGH SENSITIVITY    Collection Time: 10/18/21  7:39 PM   Result Value Ref Range    Troponin-High Sensitivity 376 (HH) 0 - 76 ng/L   PROTHROMBIN TIME + INR    Collection Time: 10/18/21  7:39 PM   Result Value Ref Range    INR 1.0 0.9 - 1.1      Prothrombin time 10.3 9.0 - 11.1 sec   PTT    Collection Time: 10/18/21  7:39 PM   Result Value Ref Range    aPTT 24.9 22.1 - 31.0 sec    aPTT, therapeutic range     58.0 - 77.0 SECS   URINALYSIS W/MICROSCOPIC    Collection Time: 10/18/21  7:39 PM   Result Value Ref Range    Color YELLOW/STRAW      Appearance CLEAR CLEAR      Specific gravity 1.026 1.003 - 1.030      pH (UA) 6.0 5.0 - 8.0      Protein 30 (A) NEG mg/dL    Glucose Negative NEG mg/dL    Ketone TRACE (A) NEG mg/dL    Bilirubin Negative NEG      Blood Negative NEG      Urobilinogen 0.2 0.2 - 1.0 EU/dL    Nitrites Negative NEG      Leukocyte Esterase Negative NEG      WBC 5-10 0 - 4 /hpf    RBC 0-5 0 - 5 /hpf    Epithelial cells FEW FEW /lpf    Bacteria Negative NEG /hpf   URINE CULTURE HOLD SAMPLE    Collection Time: 10/18/21  7:39 PM    Specimen: Serum; Urine   Result Value Ref Range    Urine culture hold        Urine on hold in Microbiology dept for 2 days. If unpreserved urine is submitted, it cannot be used for addtional testing after 24 hours, recollection will be required. EKG, 12 LEAD, INITIAL    Collection Time: 10/18/21  7:46 PM   Result Value Ref Range    Ventricular Rate 58 BPM    Atrial Rate 58 BPM    P-R Interval 246 ms    QRS Duration 92 ms    Q-T Interval 432 ms    QTC Calculation (Bezet) 424 ms    Calculated P Axis 55 degrees    Calculated R Axis -16 degrees    Calculated T Axis -139 degrees    Diagnosis       Sinus bradycardia with 1st degree AV block  Left ventricular hypertrophy with repolarization abnormality  Abnormal ECG  When compared with ECG of 30-JUL-2018 20:01,  No significant change was found         Imaging  XR CHEST PA LAT    Result Date: 10/18/2021  No acute process. CT HEAD WO CONT    Result Date: 10/18/2021  Normal study            Assessment and Plan     Vaughn Finnegan is a 54 y.o. male with a known history of HTN who is admitted for further work-up and evaluation of L-sided Paresthesias. L-sided Paresthesia's: Patient w/ L-sided \"numbness\" from his face down to LLE without focal motor weakness x 2 weeks.  CT head w/o contrast negative for acute infarct or ICH. Etiology unclear. DDx includes old lacunar infarct, multiple sclerosis, transverse myelitis, atypical migraine, ? spinal cord compression. However, patient does not meet criteria for most of these disorders. Will work-up broadly and consult neurology for recommendations. - Admit to Obs, Vital signs per unit protocol  - Neurology consulted; appreciate rec's  - Cardiac Monitoring  - Obtain MRI brain w and w/o contrast  - Diet: NPO (until bedside swallow passed)  - Labs pending: HIV, TSH, RPR, B12/Folate, UDS, fasting lipid panel, A1C    - Last lipid panel from 12/6/2018: , HDL 38, LDL 81, VLDL 53, .   - Daily CBC/CMP/Mg    Elevated troponin: POA Trop 376. EKG w/ known T wave inversions, unchanged from prior w/o ischemic changes. Denies CP or associated ACS symptoms. Likely supply/demand, Type II, in s/o uncontrolled HTN.   - Obtain repeat q2h Troponin  - Cardiac monitoring  - Labs pending as above    Uncontrolled Hypertension: /103. Only on one home medication, recently discontinued Hyzaar per Dr. Martin Fischer 3 months ago. - Holding home amlodipine 10 mg daily (restart once patient passes bedside swallow)  - Hydral 10 mg q6h PRN for SBP > 180 and/or DBP > 100. Leukopenia: POA WBC 3.3 (previously 2.9 on 1/2018, 2.6 on 7/2019). - Obtain peripheral smear    Acute Hypokalemia: POA K 3.1. Unclear etiology, not currently on K-excreting medications. - Continue to monitor  - Give 40 mEq KCl PO (once patient passes bedside swallow)  - Replete PRN    Elevated Cr in s/o Stage II CKD: POA Cr 1.36 (b/l ~ 1 to 1.15), GFR > 60. Estimated Creatinine Clearance: 84.7 mL/min (A) (based on SCr of 1.36 mg/dL (H)). Slight increase in Cr may be 2/2 IVVD. - Avoid nephrotoxic agents. - If fails bedside swallow, can initiate MIVF. Obesity: Body mass index is 31.4 kg/m². - Encourage lifestyle modification and further follow up with PCP outpatient. FEN/GI: NPO.    Activity: Up ad aleksandr  DVT prophylaxis: Lovenox  GI prophylaxis:  Not indicated  Disposition: Plan to d/c to TBD. Point of Contact: Darell Wisdom (Spouse): [phone]    CODE STATUS:  Full Code       Patient case will be discussed with Dr. Katya Garrison (Family Medicine Attending)       Michelle Mejia MD  [facility]  Date Reviewed: 5/17/2021        Codes Class Noted POA    Elevated troponin ICD-10-CM: R77.8  ICD-9-CM: 790.6  10/18/2021 Unknown        Paresthesia of left upper and lower extremity ICD-10-CM: R20.2  ICD-9-CM: 782.0  10/18/2021 Unknown            [address] Medicine Residency Attending Addendum:   I discussed the findings, assessment and plan with the resident and agree with the resident's findings and plan as documented in the resident's note.       Eliza Sanchez MD, FAAFP, CAQSM, RMSK

## 2021-10-19 NOTE — DISCHARGE INSTRUCTIONS
HOME DISCHARGE INSTRUCTIONS    Laura Mirza / [id number] : 1970    Admission date: 10/18/2021 Discharge date: 10/19/2021 9:32 AM     Please bring this form with you to show your care provider at your follow-up appointment. Primary care provider:  Riana Zhao MD    Discharging provider:  Hal Devine MD  - Family Medicine Resident  Anant Montilla MD - Family Medicine Attending      You have been admitted to the hospital with the following diagnoses:    ACUTE DIAGNOSES:  Paresthesia of left upper and lower extremity [R20.2]  Elevated troponin [R77.8]   Elevated Creatinine   Hypokalemia     . . . . . . . . . . . . . . . . . . . . . . . . . . . . . . . . . . . . . . . . . . . . . . . . . . . . . . . . . . . . . . . . . . . . . . . .   You are well enough to be discharged from the hospital. However, because you were inpatient in a hospital, you are at greater risk of having been exposed to the coronavirus. PLEASE stay inside and self-quarantine for 14 days to prevent further spread of the coronavirus. . . . . . . . . . . . . . . . . . . . . . . . . . . . . . . . . . . . . . . . . . . . . . . . . . . . . . . . . . . . . . . . . . . . . . . . Lamar Ramey FOLLOW-UP CARE RECOMMENDATIONS:  NEW MEDICATIONS:   - Potassium 20 mEq tablet. Take one tablet daily for 3 days.   - Atorvastatin 20 mg tablet. Take one tablet at bedtime every day. Changes: None    Stop: None     Appointments  Follow-up Information     Follow up With Specialties Details Why Contact Asia Chamberlain DO Family Medicine On 10/25/2021 Your appointment time is 10:30am.  [facility] [facility] [address]  [phone]      Augustine Rashid MD Neurology Schedule an appointment as soon as possible for a visit in 1 week You can follow up as outpatient regarding to your most recent hospitalization.  [facility]  [address]  [phone]          Things to follow with PCP:  - Hypokalemia  - Elevated Creatinine  - Hypertension   - Hyperlipidemia    Thing to follow with Neurology:  - Persistent Paresthesias.   - May consider to do an MRI of the Cervical Spine as outpatient. Follow-up tests needed: Complete Cell Blood Count. Basic Metabolic Panel to check for your Potassium Level. Pending test results: Syphilis Test.   At the time of your discharge the following test results are still pending:   Please make sure you review these results with your outpatient follow-up provider(s). DIET/what to eat:  Regular Diet    ACTIVITY:  Activity as tolerated    Wound care: N/A    Equipment needed:  N/A    Specific symptoms to watch for: chest pain, shortness of breath, fever, chills, nausea, vomiting, diarrhea, change in mentation, falling, weakness, bleeding. What to do if new or unexpected symptoms occur? If you experience any of the above symptoms (or should other concerns or questions arise after discharge) please call your primary care physician. Return to the emergency room if you cannot get hold of your doctor. · It is very important that you keep your follow-up appointment(s). · Please bring discharge papers, medication list (and/or medication bottles) to your follow-up appointments for review by your outpatient provider(s). · Please check the list of medications and be sure it includes every medication (even non-prescription medications) that your provider wants you to take. · It is important that you take the medication exactly as they are prescribed. · Keep your medication in the bottles provided by the pharmacist and keep a list of the medication names, dosages, and times to be taken in your wallet. · Do not take other medications without consulting your doctor.    · If you have any questions about your medications or other instructions, please talk to your nurse or care provider before you leave the hospital.     Information obtained by:     I understand that if any problems occur once I am at home I am to contact my physician. These instructions were explained to me and I had the opportunity to ask questions. I understand and acknowledge receipt of the instructions indicated above.                                                                                                                                                Physician's or R.N.'s Signature                                                                  Date/Time                                                                                                                                              Patient or Representative Signature                                                          Date/Time

## 2021-10-19 NOTE — PROGRESS NOTES
[facility]   Senior Resident Admission Note    CC: L sided numbness    HPI:  Mia Nguyen is a 54 y.o. male with PMHx of HTN, who presents to the ER complaining of L sided paresthesias x2 weeks. He has had constant L sided facial, upper extremity, lower extremity, and flank/back numbness for the past 2 weeks. There is no weakness, slurred speech, changes in vision, gait abnormalities, chest pain, SOB. In the ED:   Vitals:   Visit Vitals  BP (!) 170/103 (BP 1 Location: Right upper arm, BP Patient Position: At rest;Sitting)   Pulse 72   Temp 97.1 °F (36.2 °C)   Resp 16   Ht 6' 1\" (1.854 m)   Wt 238 lb (108 kg)   SpO2 99%   BMI 31.40 kg/m²     Chart reviewed. Patient seen, examined, and discussed with Dr. Bianca Castellon (PGY-1). See Resident H&P note for more details. Pertinent PE Findings:   Physical Exam  Constitutional:       Appearance: Normal appearance. Cardiovascular:      Rate and Rhythm: Regular rhythm. Bradycardia present. Neurological:      General: No focal deficit present. Mental Status: He is alert and oriented to person, place, and time. Cranial Nerves: No cranial nerve deficit. Sensory: Sensory deficit present. Motor: No weakness. Coordination: Coordination normal.      Gait: Gait normal.          A/P: 52yo male with PMHx of HTN admitted to observation for L sided paresthesias and elevated trop.     L sided paresthesias: Unclear etiology as entire L side has been numb for 2 weeks, differential is broad including MS, other central nervous system lesion, infection, unlikely vitamin deficiency, etc.   - TSH, Vit B12, folate, RPR, HIV, UDS  - Brain MRI  - Neuro consult    Elevated trop: Likely 2/2 uncontrolled severely elevated hypertension, hstrop 376 on admission, EKG sinus kt with 1st degree AV block, LVH, and t wave inversions in I, II, avL, V3-6 unchanged from previous, no new ST changes.  - Repeat trop now, will consult cards if trop rises significantly   - Cardiac monitoring   - Lipid panel, HgA1C       Pt discussed with Dr. Randy Ramsay (on-call attending physician)    Patric Macdonald DO  Family Medicine Resident PGY-3

## 2021-10-19 NOTE — PROGRESS NOTES
[address] Medicine Residency Attending Addendum:  I saw and evaluated the patient on the day of the encounter with Dr. Clement Pathak, performing the raman elements of the service. I discussed the findings, assessment and plan with the resident and agree with the resident's findings and plan as documented in the resident's note. Symptoms have improved. Labs thus far negative for reason for patient's symptoms. MRI negative. Appreciate neurology's assistance and recommendations. Moira Sutherland MD, FAAFP, Dennie Hammed, RMSK        Progress Note     10/19/2021  PCP: Irving Victor MD     Assessment/Plan:     Steff Lozano is a 54 y.o. male with a known history of HTN who is admitted for further work-up and evaluation of L-sided Paresthesias. Overnight Events: NAEO. Passed bedside swallow. L-sided Paresthesia's: Patient w/ L-sided \"numbness\" from his face down to LLE without focal motor weakness x 2 weeks. CT head w/o contrast negative for acute infarct or ICH. Etiology unclear. DDx includes old lacunar infarct, multiple sclerosis, transverse myelitis, atypical migraine. However, patient does not meet criteria for most of these disorders. Will work-up broadly and consult neurology for recommendations. Passed bedside swallow. TSH WNL.   - Neurology consulted; appreciate rec's  - Cardiac Monitoring  - Obtain MRI brain w and w/o contrast  - Diet: Regular, low sodium  - Labs pending: HIV, RPR, B12/Folate, UDS, fasting lipid panel, A1C    - Last lipid panel from 12/6/2018: , HDL 38, LDL 81, VLDL 53, .   - Daily CBC/CMP/Mg     Elevated troponin: (Improved) POA Trop 376 > repeat trop 371. EKG w/ known T wave inversions, unchanged from prior w/o ischemic changes. Denies CP or associated ACS symptoms. Likely supply/demand, Type II, in s/o uncontrolled HTN.   - Cardiac monitoring  - Labs pending as above     Uncontrolled Hypertension: /103.  Only on one home medication, recently discontinued Yue per Dr. Asim Greenfield 3 months ago. - Restarted home amlodipine 10 mg daily   - Hydral 10 mg q6h PRN for SBP > 180 and/or DBP > 100. Leukopenia: POA WBC 3.3 (previously 2.9 on 2018, 2.6 on 2019). Appears to be chronic. No obvious etiology. DDx is broad an includes malignancy, autoimmune conditions, B12/folate deficiency, infectious (e.g., rickettsia, lyme, HIV). - Peripheral smear pending  - Work-up outpatient     Acute Hypokalemia: POA K 3.1. Unclear etiology, not currently on K-excreting medications. S/p 40 mEq PO KCl.  - Continue to monitor  - Replete PRN     Elevated Cr in s/o Stage II CKD: POA Cr 1.36 (b/l ~ 1 to 1.15), GFR > 60. Estimated Creatinine Clearance: 84.7 mL/min (A) (based on SCr of 1.36 mg/dL (H)). Slight increase in Cr may be 2/2 IVVD. - Avoid nephrotoxic agents. Obesity: Body mass index is 31.4 kg/m². - Encourage lifestyle modification and further follow up with PCP outpatient. FEN/GI: Regular/low Na  Activity: Up ad aleksandr  DVT prophylaxis: Lovenox  GI prophylaxis:  Not indicated  Disposition: Plan to d/c to TBD. Point of Contact: [place] (Spouse): [phone]    Kamini Tello discussed with Dr. Dorsie Paget. Subjective:   Pt was seen and examined at bedside. Resting comfortably, no complaints at this time. Continues to report left-sided numbness, unchanged in the interval since admission. Otherwise, continues to deny any somatic pain including CP, abdominal pain. No diaphoresis, n/v, SOB. Objective:   Physical examination  Patient Vitals for the past 24 hrs:   Temp Pulse Resp BP SpO2   10/19/21 0132 -- 65 14 (!) 152/80 99 %   10/18/21 1819 97.1 °F (36.2 °C) 72 16 (!) 170/103 99 %      Temp (24hrs), Av.1 °F (36.2 °C), Min:97.1 °F (36.2 °C), Max:97.1 °F (36.2 °C)         O2 Device: None (Room air)       General: No acute distress. Alert. Cooperative. Head: Normocephalic. Atraumatic. Eyes:              Conjunctiva pink. Sclera white. PERRL. Ears:              Hearing grossly intact. Nose:             Septum midline. Mucosa pink. No drainage. Throat: Mucosa pink. Moist mucous membranes. No tonsillar exudates or erythema. Palate movement equal bilaterally. Neck: Supple. Normal ROM. No stiffness. Respiratory: CTAB. No w/r/r/c.   Cardiovascular: RRR. Normal S1,S2. No m/r/g. Pulses 2+ throughout. GI: + bowel sounds. Nontender. No rebound tenderness or guarding. Nondistended. Extremities: No BLE edema. Distal pulses intact. Musculoskeletal: Full ROM in all extremities. Skin: Warm, dry. No rashes. Neuro: Alert and oriented x 3. Decreased sensation to light touch in all 3 distrubution's of CN V on the LEFT side. Decreased sensation to light touch to LEFT upper AND lower extremities. Decreased vibratory sensation to LEFT upper AND lower extremities along with LEFT CN V (all distributions). Patellar, achilles, brachioradialis reflexes symmetric and 2+ bilaterally. Otherwise, remaining Cranial Nerves intact. Strength 5/5 in all extremities. No pronator drift. Normal FNF test. Gait normal. No dysarthria. Data Review:     CBC:  Recent Labs     10/19/21  0110 10/18/21  1939   WBC 3.2* 3.3*   HGB 13.6 13.4   HCT 37.4 38.5    466     Metabolic Panel:  Recent Labs     10/19/21  0110 10/18/21  1939    142   K 3.0* 3.1*    106   CO2 29 31   BUN 14 17   CREA 1.14 1.36*   * 102*   CA 8.4* 8.9   MG 2.1 2.0   ALB 3.8 4.0   TBILI 0.4 0.4   ALT 32 33   INR  --  1.0     Micro:  No results found for: CULT  Imaging:  XR CHEST PA LAT    Result Date: 10/18/2021  INDICATION: Left sided numbness COMPARISON: None FINDINGS: Frontal and lateral views of the chest demonstrate a normal cardiomediastinal silhouette. The lungs are adequately expanded. There is no edema, effusion, consolidation, or pneumothorax. The osseous structures are unremarkable. No acute process.      CT HEAD WO CONT    Result Date: 10/18/2021  EXAM: CT HEAD WO CONT INDICATION: HTN, paresthesia to left face, arm, leg x 1 week. COMPARISON: None. CONTRAST: None. TECHNIQUE: Unenhanced CT of the head was performed using 5 mm images. Brain and bone windows were generated. Coronal and sagittal reformats. CT dose reduction was achieved through use of a standardized protocol tailored for this examination and automatic exposure control for dose modulation. FINDINGS: The ventricles and sulci are normal in size, shape and configuration. . There is no significant white matter disease. There is no intracranial hemorrhage, extra-axial collection, or mass effect. The basilar cisterns are open. No CT evidence of acute infarct. The bone windows demonstrate no abnormalities. The visualized portions of the paranasal sinuses and mastoid air cells are clear. Normal study     Medications reviewed  Current Facility-Administered Medications   Medication Dose Route Frequency    amLODIPine (NORVASC) tablet 10 mg  10 mg Oral DAILY    sodium chloride (NS) flush 5-40 mL  5-40 mL IntraVENous Q8H    sodium chloride (NS) flush 5-40 mL  5-40 mL IntraVENous PRN    acetaminophen (TYLENOL) tablet 650 mg  650 mg Oral Q6H PRN    Or    acetaminophen (TYLENOL) suppository 650 mg  650 mg Rectal Q6H PRN    polyethylene glycol (MIRALAX) packet 17 g  17 g Oral DAILY PRN    ondansetron (ZOFRAN ODT) tablet 4 mg  4 mg Oral Q8H PRN    Or    ondansetron (ZOFRAN) injection 4 mg  4 mg IntraVENous Q6H PRN    enoxaparin (LOVENOX) injection 40 mg  40 mg SubCUTAneous DAILY    hydrALAZINE (APRESOLINE) 20 mg/mL injection 10 mg  10 mg IntraVENous Q6H PRN     Current Outpatient Medications   Medication Sig    amLODIPine (NORVASC) 10 mg tablet Take 1 Tab by mouth daily. Blood Pressure Test Kit-Large (QUICK RESPONSE BP MONITOR) kit 1 Kit by Does Not Apply route daily.     fluticasone (FLONASE) 50 mcg/actuation nasal spray 2 puffs each nostril at bedtime         Signed:   Arben Cope MD   Resident, Family Medicine      Attending note: Attending note to follow. ..

## 2021-10-19 NOTE — ED PROVIDER NOTES
This is a 72-year-old male with past medical history of LVH and hypertension who presents the ER today for evaluation of left-sided numbness along his left face, left arm, and left leg for about 1 week in duration. He denies any chest pain, difficulty breathing, diaphoresis, motor weakness. Past Medical History:   Diagnosis Date    Hypertension        Past Surgical History:   Procedure Laterality Date    HX APPENDECTOMY      HX HEENT      tonsillectomy         Family History:   Problem Relation Age of Onset    Hypertension Mother     Diabetes Mother     Hypertension Father        Social History     Socioeconomic History    Marital status: SINGLE     Spouse name: Not on file    Number of children: Not on file    Years of education: Not on file    Highest education level: Not on file   Occupational History    Not on file   Tobacco Use    Smoking status: Never Smoker    Smokeless tobacco: Never Used   Substance and Sexual Activity    Alcohol use: Yes    Drug use: No    Sexual activity: Yes     Partners: Female     Birth control/protection: Condom   Other Topics Concern     Service Not Asked    Blood Transfusions Not Asked    Caffeine Concern Not Asked    Occupational Exposure Not Asked    Hobby Hazards Not Asked    Sleep Concern Not Asked    Stress Concern Not Asked    Weight Concern Not Asked    Special Diet Not Asked    Back Care Not Asked    Exercise Not Asked    Bike Helmet Not Asked   [address] [facility] Not Asked    Self-Exams Not Asked   Social History Narrative    Not on file     Social Determinants of Health     Financial Resource Strain:     Difficulty of Paying Living Expenses:    Food Insecurity:     Worried About Running Out of Food in the Last Year:     [address] in the Last Year:    Transportation Needs:     Lack of Transportation (Medical):      Lack of Transportation (Non-Medical):    Physical Activity:     Days of Exercise per Week:     Minutes of Exercise per Session:    Stress:     Feeling of Stress :    Social Connections:     Frequency of Communication with Friends and Family:     Frequency of Social Gatherings with Friends and Family:     Attends Jewish Services:     Active Member of Clubs or Organizations:     Attends Club or Organization Meetings:     Marital Status:    Intimate Partner Violence:     Fear of Current or Ex-Partner:     Emotionally Abused:     Physically Abused:     Sexually Abused: ALLERGIES: Patient has no known allergies. Review of Systems   Constitutional: Negative for fever. HENT: Negative for sore throat. Eyes: Negative for visual disturbance. Respiratory: Negative for shortness of breath. Cardiovascular: Negative for palpitations. Gastrointestinal: Negative for vomiting. Genitourinary: Negative for dysuria. Musculoskeletal: Negative for myalgias. Skin: Negative for rash. Neurological: Positive for numbness and headaches. Negative for dizziness. Psychiatric/Behavioral: Negative for dysphoric mood. Vitals:    10/18/21 1819   BP: (!) 170/103   Pulse: 72   Resp: 16   Temp: 97.1 °F (36.2 °C)   SpO2: 99%   Weight: 108 kg (238 lb)   Height: 6' 1\" (1.854 m)            Physical Exam  Vitals and nursing note reviewed. Constitutional:       General: He is not in acute distress. Appearance: Normal appearance. He is not ill-appearing. HENT:      Head: Normocephalic and atraumatic. Nose: Nose normal.      Mouth/Throat:      Mouth: Mucous membranes are moist.      Pharynx: Oropharynx is clear. Eyes:      Extraocular Movements: Extraocular movements intact. Cardiovascular:      Rate and Rhythm: Normal rate and regular rhythm. Pulses: Normal pulses. Heart sounds: Normal heart sounds. Pulmonary:      Effort: Pulmonary effort is normal.      Breath sounds: Normal breath sounds. Abdominal:      General: Bowel sounds are normal.      Palpations: Abdomen is soft. Musculoskeletal:         General: Normal range of motion. Cervical back: Normal range of motion and neck supple. Skin:     General: Skin is warm and dry. Neurological:      Mental Status: He is alert and oriented to person, place, and time. Mental status is at baseline. Psychiatric:         Mood and Affect: Mood normal.         Behavior: Behavior normal.          MDM     VITAL SIGNS:  Patient Vitals for the past 4 hrs:   Temp Pulse Resp BP SpO2   10/18/21 1819 97.1 °F (36.2 °C) 72 16 (!) 170/103 99 %         LABS:  Recent Results (from the past 6 hour(s))   CBC WITH AUTOMATED DIFF    Collection Time: 10/18/21  7:39 PM   Result Value Ref Range    WBC 3.3 (L) 4.1 - 11.1 K/uL    RBC 4.95 4.10 - 5.70 M/uL    HGB 13.4 12.1 - 17.0 g/dL    HCT 38.5 36.6 - 50.3 %    MCV 77.8 (L) 80.0 - 99.0 FL    MCH 27.1 26.0 - 34.0 PG    MCHC 34.8 30.0 - 36.5 g/dL    RDW 12.6 11.5 - 14.5 %    PLATELET 813 431 - 207 K/uL    MPV 9.2 8.9 - 12.9 FL    NRBC 0.0 0  WBC    ABSOLUTE NRBC 0.00 0.00 - 0.01 K/uL    NEUTROPHILS 37 32 - 75 %    LYMPHOCYTES 47 12 - 49 %    MONOCYTES 10 5 - 13 %    EOSINOPHILS 5 0 - 7 %    BASOPHILS 1 0 - 1 %    IMMATURE GRANULOCYTES 0 0.0 - 0.5 %    ABS. NEUTROPHILS 1.2 (L) 1.8 - 8.0 K/UL    ABS. LYMPHOCYTES 1.6 0.8 - 3.5 K/UL    ABS. MONOCYTES 0.3 0.0 - 1.0 K/UL    ABS. EOSINOPHILS 0.2 0.0 - 0.4 K/UL    ABS. BASOPHILS 0.0 0.0 - 0.1 K/UL    ABS. IMM.  GRANS. 0.0 0.00 - 0.04 K/UL    DF AUTOMATED     METABOLIC PANEL, COMPREHENSIVE    Collection Time: 10/18/21  7:39 PM   Result Value Ref Range    Sodium 142 136 - 145 mmol/L    Potassium 3.1 (L) 3.5 - 5.1 mmol/L    Chloride 106 97 - 108 mmol/L    CO2 31 21 - 32 mmol/L    Anion gap 5 5 - 15 mmol/L    Glucose 102 (H) 65 - 100 mg/dL    BUN 17 6 - 20 MG/DL    Creatinine 1.36 (H) 0.70 - 1.30 MG/DL    BUN/Creatinine ratio 13 12 - 20      GFR est AA >60 >60 ml/min/1.73m2    GFR est non-AA 56 (L) >60 ml/min/1.73m2    Calcium 8.9 8.5 - 10.1 MG/DL    Bilirubin, total 0.4 0.2 - 1.0 MG/DL    ALT (SGPT) 33 12 - 78 U/L    AST (SGOT) 28 15 - 37 U/L    Alk. phosphatase 77 45 - 117 U/L    Protein, total 7.2 6.4 - 8.2 g/dL    Albumin 4.0 3.5 - 5.0 g/dL    Globulin 3.2 2.0 - 4.0 g/dL    A-G Ratio 1.3 1.1 - 2.2     MAGNESIUM    Collection Time: 10/18/21  7:39 PM   Result Value Ref Range    Magnesium 2.0 1.6 - 2.4 mg/dL   TROPONIN-HIGH SENSITIVITY    Collection Time: 10/18/21  7:39 PM   Result Value Ref Range    Troponin-High Sensitivity 376 (HH) 0 - 76 ng/L   PROTHROMBIN TIME + INR    Collection Time: 10/18/21  7:39 PM   Result Value Ref Range    INR 1.0 0.9 - 1.1      Prothrombin time 10.3 9.0 - 11.1 sec   PTT    Collection Time: 10/18/21  7:39 PM   Result Value Ref Range    aPTT 24.9 22.1 - 31.0 sec    aPTT, therapeutic range     58.0 - 77.0 SECS   URINALYSIS W/MICROSCOPIC    Collection Time: 10/18/21  7:39 PM   Result Value Ref Range    Color YELLOW/STRAW      Appearance CLEAR CLEAR      Specific gravity 1.026 1.003 - 1.030      pH (UA) 6.0 5.0 - 8.0      Protein 30 (A) NEG mg/dL    Glucose Negative NEG mg/dL    Ketone TRACE (A) NEG mg/dL    Bilirubin Negative NEG      Blood Negative NEG      Urobilinogen 0.2 0.2 - 1.0 EU/dL    Nitrites Negative NEG      Leukocyte Esterase Negative NEG      WBC 5-10 0 - 4 /hpf    RBC 0-5 0 - 5 /hpf    Epithelial cells FEW FEW /lpf    Bacteria Negative NEG /hpf   URINE CULTURE HOLD SAMPLE    Collection Time: 10/18/21  7:39 PM    Specimen: Serum; Urine   Result Value Ref Range    Urine culture hold        Urine on hold in Microbiology dept for 2 days. If unpreserved urine is submitted, it cannot be used for addtional testing after 24 hours, recollection will be required.    EKG, 12 LEAD, INITIAL    Collection Time: 10/18/21  7:46 PM   Result Value Ref Range    Ventricular Rate 58 BPM    Atrial Rate 58 BPM    P-R Interval 246 ms    QRS Duration 92 ms    Q-T Interval 432 ms    QTC Calculation (Bezet) 424 ms    Calculated P Axis 55 degrees Calculated R Axis -16 degrees    Calculated T Axis -139 degrees    Diagnosis       Sinus bradycardia with 1st degree AV block  Left ventricular hypertrophy with repolarization abnormality  Abnormal ECG  When compared with ECG of 30-JUL-2018 20:01,  No significant change was found          IMAGING:  XR CHEST PA LAT   Final Result   No acute process. CT HEAD WO CONT   Final Result   Normal study                  Medications During Visit:  Medications   acetaminophen (TYLENOL) tablet 1,000 mg (has no administration in time range)   aspirin chewable tablet 324 mg (has no administration in time range)   nitroglycerin (NITROBID) 2 % ointment 1 Inch (has no administration in time range)         DECISION MAKING:  Mia Nguyen is a 54 y.o. male who comes in as above. EKG: nonspecific ST and T waves changes. Work-up remarkable for elevated troponin. Patient given aspirin and Nitropaste for hypertension and will admit to medicine for further management. Perfect Serve Consult for Admission  9:38 PM    ED Room Number: CW/CW  Patient Name and age:  Mia Nguyen 54 y.o.  male  Working Diagnosis:   1. Hypertension, unspecified type    2. Elevated troponin        COVID-19 Suspicion:  no  Sepsis present:  no  Reassessment needed: no  Code Status:  Full Code  Readmission: no  Isolation Requirements:  no  Recommended Level of Care:  telemetry  Department: [facility] ED  Other: Hypertension, elevated troponin, left-sided paresthesia      IMPRESSION:  1. Hypertension, unspecified type    2.  Elevated troponin        DISPOSITION:  Admitted

## 2021-10-19 NOTE — CONSULTS
NEUROLOGY IN-PATIENT NEW CONSULTATION      10/19/2021    RE: Allie Barrera         1970      REFERRED BY:  Mehran Whitehead MD      CHIEF COMPLAINT:  This is Allie Barrera is a 54 y.o. male who had concerns including Numbness. HPI:     Patient comes in with 2 weeks of L sided numbness from face down to his L LE described as loss of sensation. (-) pain  (+) stuttering speech    (-) weakness  (+) stress  (-) neck pain    ROS   (-) fever  (-) rash  All other systems reviewed and are negative    Past Medical Hx  Past Medical History:   Diagnosis Date    Hypertension        Social Hx  Social History     Socioeconomic History    Marital status: SINGLE     Spouse name: Not on file    Number of children: Not on file    Years of education: Not on file    Highest education level: Not on file   Tobacco Use    Smoking status: Never Smoker    Smokeless tobacco: Never Used   Substance and Sexual Activity    Alcohol use: Yes    Drug use: No    Sexual activity: Yes     Partners: Female     Birth control/protection: Condom   Other Topics Concern     Social Determinants of Health     Financial Resource Strain:     Difficulty of Paying Living Expenses:    Food Insecurity:     Worried About Running Out of Food in the Last Year:     [address] in the Last Year:    Transportation Needs:     Lack of Transportation (Medical):      Lack of Transportation (Non-Medical):    Physical Activity:     Days of Exercise per Week:     Minutes of Exercise per Session:    Stress:     Feeling of Stress :    Social Connections:     Frequency of Communication with Friends and Family:     Frequency of Social Gatherings with Friends and Family:     Attends Christian Services:     Active Member of Clubs or Organizations:     Attends Club or Organization Meetings:     Marital Status:        Family Hx  Family History   Problem Relation Age of Onset    Hypertension Mother     Diabetes Mother     Hypertension Father ALLERGIES  No Known Allergies    CURRENT MEDS  Current Facility-Administered Medications   Medication Dose Route Frequency Provider Last Rate Last Admin    amLODIPine (NORVASC) tablet 10 mg  10 mg Oral DAILY Jatin Guajardo MD   10 mg at 10/19/21 1039    sodium chloride (NS) flush 5-40 mL  5-40 mL IntraVENous Q8H Jatin Guajardo MD        sodium chloride (NS) flush 5-40 mL  5-40 mL IntraVENous PRN Jatin Guajardo MD        acetaminophen (TYLENOL) tablet 650 mg  650 mg Oral Q6H PRN Jatin Guajardo MD        Or    acetaminophen (TYLENOL) suppository 650 mg  650 mg Rectal Q6H PRN Jatin Guajardo MD        polyethylene glycol (MIRALAX) packet 17 g  17 g Oral DAILY PRN Jatin Guajardo MD        ondansetron (ZOFRAN ODT) tablet 4 mg  4 mg Oral Q8H PRN Jatin Guajardo MD        Or    ondansetron [facility]) injection 4 mg  4 mg IntraVENous Q6H PRN Jatin Guajardo MD        enoxaparin (LOVENOX) injection 40 mg  40 mg SubCUTAneous DAILY Raydell Hoehn, Mohsin, MD        hydrALAZINE (APRESOLINE) 20 mg/mL injection 10 mg  10 mg IntraVENous Q6H PRN Jatin Guajardo MD         Current Outpatient Medications   Medication Sig Dispense Refill    amLODIPine (NORVASC) 10 mg tablet Take 1 Tab by mouth daily. 60 Tab 1    Blood Pressure Test Kit-Large (QUICK RESPONSE BP MONITOR) kit 1 Kit by Does Not Apply route daily. 1 Kit 0    fluticasone (FLONASE) 50 mcg/actuation nasal spray 2 puffs each nostril at bedtime 1 Bottle 3           PREVIOUS WORKUP: (reviewed)  IMAGING:    CT Results (recent):  Results from Hospital Encounter encounter on 10/18/21    CT HEAD WO CONT    Narrative  EXAM: CT HEAD WO CONT    INDICATION: HTN, paresthesia to left face, arm, leg x 1 week. COMPARISON: None. CONTRAST: None. TECHNIQUE: Unenhanced CT of the head was performed using 5 mm images. Brain and  bone windows were generated. Coronal and sagittal reformats.  CT dose reduction  was achieved through use of a standardized protocol tailored for this  examination and automatic exposure control for dose modulation. FINDINGS:  The ventricles and sulci are normal in size, shape and configuration. . There is  no significant white matter disease. There is no intracranial hemorrhage,  extra-axial collection, or mass effect. The basilar cisterns are open. No CT  evidence of acute infarct. The bone windows demonstrate no abnormalities. The visualized portions of the  paranasal sinuses and mastoid air cells are clear. Impression  Normal study      MRI Results (recent):  Results from Hospital Encounter encounter on 10/18/21    MRI BRAIN W WO CONT    Narrative  INDICATION:  left-sided paresthesias    COMPARISON:  CT head 10/18/2021    TECHNIQUE:  Multiplanar multisequence acquisition without and with IV contrast  of the brain. FINDINGS:    Ventricles:  Midline, no hydrocephalus. Brain Parenchyma/Brainstem:  Normal for age. No acute infarction. Intracranial Hemorrhage:  None. Basal Cisterns:  Normal.  Flow Voids:  Normal.  Post Contrast:  No abnormal parenchymal or meningeal enhancement. Additional Comments:  N/A. Impression  No significant abnormality or acute process. IR Results (recent):  No results found for this or any previous visit. VAS/US Results (recent):  No results found for this or any previous visit. LABS (reviewed)  Results for orders placed or performed during the hospital encounter of 10/18/21   URINE CULTURE HOLD SAMPLE    Specimen: Serum; Urine   Result Value Ref Range    Urine culture hold        Urine on hold in Microbiology dept for 2 days. If unpreserved urine is submitted, it cannot be used for addtional testing after 24 hours, recollection will be required.    CBC WITH AUTOMATED DIFF   Result Value Ref Range    WBC 3.3 (L) 4.1 - 11.1 K/uL    RBC 4.95 4.10 - 5.70 M/uL    HGB 13.4 12.1 - 17.0 g/dL    HCT 38.5 36.6 - 50.3 %    MCV 77.8 (L) 80.0 - 99.0 FL    MCH 27.1 26.0 - 34.0 PG    MCHC 34.8 30.0 - 36.5 g/dL    RDW 12.6 11.5 - 14.5 % PLATELET 915 827 - 358 K/uL    MPV 9.2 8.9 - 12.9 FL    NRBC 0.0 0  WBC    ABSOLUTE NRBC 0.00 0.00 - 0.01 K/uL    NEUTROPHILS 37 32 - 75 %    LYMPHOCYTES 47 12 - 49 %    MONOCYTES 10 5 - 13 %    EOSINOPHILS 5 0 - 7 %    BASOPHILS 1 0 - 1 %    IMMATURE GRANULOCYTES 0 0.0 - 0.5 %    ABS. NEUTROPHILS 1.2 (L) 1.8 - 8.0 K/UL    ABS. LYMPHOCYTES 1.6 0.8 - 3.5 K/UL    ABS. MONOCYTES 0.3 0.0 - 1.0 K/UL    ABS. EOSINOPHILS 0.2 0.0 - 0.4 K/UL    ABS. BASOPHILS 0.0 0.0 - 0.1 K/UL    ABS. IMM. GRANS. 0.0 0.00 - 0.04 K/UL    DF AUTOMATED     METABOLIC PANEL, COMPREHENSIVE   Result Value Ref Range    Sodium 142 136 - 145 mmol/L    Potassium 3.1 (L) 3.5 - 5.1 mmol/L    Chloride 106 97 - 108 mmol/L    CO2 31 21 - 32 mmol/L    Anion gap 5 5 - 15 mmol/L    Glucose 102 (H) 65 - 100 mg/dL    BUN 17 6 - 20 MG/DL    Creatinine 1.36 (H) 0.70 - 1.30 MG/DL    BUN/Creatinine ratio 13 12 - 20      GFR est AA >60 >60 ml/min/1.73m2    GFR est non-AA 56 (L) >60 ml/min/1.73m2    Calcium 8.9 8.5 - 10.1 MG/DL    Bilirubin, total 0.4 0.2 - 1.0 MG/DL    ALT (SGPT) 33 12 - 78 U/L    AST (SGOT) 28 15 - 37 U/L    Alk.  phosphatase 77 45 - 117 U/L    Protein, total 7.2 6.4 - 8.2 g/dL    Albumin 4.0 3.5 - 5.0 g/dL    Globulin 3.2 2.0 - 4.0 g/dL    A-G Ratio 1.3 1.1 - 2.2     MAGNESIUM   Result Value Ref Range    Magnesium 2.0 1.6 - 2.4 mg/dL   TROPONIN-HIGH SENSITIVITY   Result Value Ref Range    Troponin-High Sensitivity 376 (HH) 0 - 76 ng/L   PROTHROMBIN TIME + INR   Result Value Ref Range    INR 1.0 0.9 - 1.1      Prothrombin time 10.3 9.0 - 11.1 sec   PTT   Result Value Ref Range    aPTT 24.9 22.1 - 31.0 sec    aPTT, therapeutic range     58.0 - 77.0 SECS   URINALYSIS W/MICROSCOPIC   Result Value Ref Range    Color YELLOW/STRAW      Appearance CLEAR CLEAR      Specific gravity 1.026 1.003 - 1.030      pH (UA) 6.0 5.0 - 8.0      Protein 30 (A) NEG mg/dL    Glucose Negative NEG mg/dL    Ketone TRACE (A) NEG mg/dL    Bilirubin Negative NEG Blood Negative NEG      Urobilinogen 0.2 0.2 - 1.0 EU/dL    Nitrites Negative NEG      Leukocyte Esterase Negative NEG      WBC 5-10 0 - 4 /hpf    RBC 0-5 0 - 5 /hpf    Epithelial cells FEW FEW /lpf    Bacteria Negative NEG /hpf   TROPONIN-HIGH SENSITIVITY   Result Value Ref Range    Troponin-High Sensitivity 371 (HH) 0 - 76 ng/L   VITAMIN B12   Result Value Ref Range    Vitamin B12 414 193 - 986 pg/mL   FOLATE   Result Value Ref Range    Folate 19.6 5.0 - 21.0 ng/mL   HEMOGLOBIN A1C WITH EAG   Result Value Ref Range    Hemoglobin A1c 5.4 4.0 - 5.6 %    Est. average glucose 797 mg/dL   METABOLIC PANEL, COMPREHENSIVE   Result Value Ref Range    Sodium 142 136 - 145 mmol/L    Potassium 3.0 (L) 3.5 - 5.1 mmol/L    Chloride 106 97 - 108 mmol/L    CO2 29 21 - 32 mmol/L    Anion gap 7 5 - 15 mmol/L    Glucose 104 (H) 65 - 100 mg/dL    BUN 14 6 - 20 MG/DL    Creatinine 1.14 0.70 - 1.30 MG/DL    BUN/Creatinine ratio 12 12 - 20      GFR est AA >60 >60 ml/min/1.73m2    GFR est non-AA >60 >60 ml/min/1.73m2    Calcium 8.4 (L) 8.5 - 10.1 MG/DL    Bilirubin, total 0.4 0.2 - 1.0 MG/DL    ALT (SGPT) 32 12 - 78 U/L    AST (SGOT) 28 15 - 37 U/L    Alk. phosphatase 72 45 - 117 U/L    Protein, total 6.8 6.4 - 8.2 g/dL    Albumin 3.8 3.5 - 5.0 g/dL    Globulin 3.0 2.0 - 4.0 g/dL    A-G Ratio 1.3 1.1 - 2.2     CBC WITH AUTOMATED DIFF   Result Value Ref Range    WBC 3.2 (L) 4.1 - 11.1 K/uL    RBC 4.80 4. 10 - 5.70 M/uL    HGB 13.6 12.1 - 17.0 g/dL    HCT 37.4 36.6 - 50.3 %    MCV 77.9 (L) 80.0 - 99.0 FL    MCH 28.3 26.0 - 34.0 PG    MCHC 36.4 30.0 - 36.5 g/dL    RDW 12.3 11.5 - 14.5 %    PLATELET 243 071 - 640 K/uL    MPV 9.5 8.9 - 12.9 FL    NRBC 0.0 0  WBC    ABSOLUTE NRBC 0.00 0.00 - 0.01 K/uL    NEUTROPHILS 36 32 - 75 %    LYMPHOCYTES 48 12 - 49 %    MONOCYTES 11 5 - 13 %    EOSINOPHILS 4 0 - 7 %    BASOPHILS 1 0 - 1 %    IMMATURE GRANULOCYTES 0 0.0 - 0.5 %    ABS. NEUTROPHILS 1.2 (L) 1.8 - 8.0 K/UL    ABS.  LYMPHOCYTES 1.5 0.8 - 3.5 K/UL    ABS. MONOCYTES 0.3 0.0 - 1.0 K/UL    ABS. EOSINOPHILS 0.1 0.0 - 0.4 K/UL    ABS. BASOPHILS 0.0 0.0 - 0.1 K/UL    ABS. IMM. GRANS. 0.0 0.00 - 0.04 K/UL    DF AUTOMATED     MAGNESIUM   Result Value Ref Range    Magnesium 2.1 1.6 - 2.4 mg/dL   PERIPHERAL SMEAR   Result Value Ref Range    PERIPHERAL SMEAR        Pathologic examination results can be viewed in [facility] Chart Review under the Pathology tab. HIV 1/2 AG/AB, 4TH GENERATION,W RFLX CONFIRM   Result Value Ref Range    HIV 1/2 Interpretation NONREACTIVE NR      HIV 1/2 result comment SEE NOTE     TSH 3RD GENERATION   Result Value Ref Range    TSH 3.42 0.36 - 3.74 uIU/mL   LIPID PANEL   Result Value Ref Range    Cholesterol, total 184 <200 MG/DL    Triglyceride 178 (H) <150 MG/DL    HDL Cholesterol 48 MG/DL    LDL, calculated 100.4 (H) 0 - 100 MG/DL    VLDL, calculated 35.6 MG/DL    CHOL/HDL Ratio 3.8 0.0 - 5.0     SAMPLES BEING HELD   Result Value Ref Range    SAMPLES BEING HELD 1RED     COMMENT        Add-on orders for these samples will be processed based on acceptable specimen integrity and analyte stability, which may vary by analyte.    METABOLIC PANEL, BASIC   Result Value Ref Range    Sodium 141 136 - 145 mmol/L    Potassium 3.0 (L) 3.5 - 5.1 mmol/L    Chloride 106 97 - 108 mmol/L    CO2 30 21 - 32 mmol/L    Anion gap 5 5 - 15 mmol/L    Glucose 130 (H) 65 - 100 mg/dL    BUN 11 6 - 20 MG/DL    Creatinine 1.23 0.70 - 1.30 MG/DL    BUN/Creatinine ratio 9 (L) 12 - 20      GFR est AA >60 >60 ml/min/1.73m2    GFR est non-AA >60 >60 ml/min/1.73m2    Calcium 8.4 (L) 8.5 - 10.1 MG/DL   EKG, 12 LEAD, INITIAL   Result Value Ref Range    Ventricular Rate 58 BPM    Atrial Rate 58 BPM    P-R Interval 246 ms    QRS Duration 92 ms    Q-T Interval 432 ms    QTC Calculation (Bezet) 424 ms    Calculated P Axis 55 degrees    Calculated R Axis -16 degrees    Calculated T Axis -139 degrees    Diagnosis       Sinus bradycardia with 1st degree AV block  Left ventricular hypertrophy with repolarization abnormality  Abnormal ECG  When compared with ECG of 30-JUL-2018 20:01,  No significant change was found         Physical Exam:     Visit Vitals  BP (!) 161/78   Pulse 65   Temp 97.1 °F (36.2 °C)   Resp 14   Ht 6' 1\" (1.854 m)   Wt 108 kg (238 lb)   SpO2 99%   BMI 31.40 kg/m²     General:  Alert, cooperative, no distress. Head:  Normocephalic, without obvious abnormality, atraumatic. Eyes:  Conjunctivae/corneas clear. Lungs:  Heart:   Non labored breathing  Regular rate and rhythm, no carotid bruits   Abdomen:   Soft, non-distended   Extremities: Extremities normal, atraumatic, no cyanosis or edema. Pulses: 2+ and symmetric all extremities. Skin: Skin color, texture, turgor normal. No rashes or lesions. Neurologic Exam     Gen: Attention normal             Language: naming, repetition, fluency normal             Memory: intact recent and remote memory  Cranial Nerves:  I: smell Not tested   II: visual fields Full to confrontation   II: pupils Equal, round, reactive to light   II: optic disc No papilledema   III,VII: ptosis none   III,IV,VI: extraocular muscles  Full ROM   V: mastication normal   V: facial light touch sensation  normal   VII: facial muscle function   symmetric   VIII: hearing symmetric   IX: soft palate elevation  normal   XI: trapezius strength  5/5   XI: sternocleidomastoid strength 5/5   XI: neck flexion strength  5/5   XII: tongue  midline     Motor: normal bulk and tone, no tremor              Strength: 5/5 all four extremities  Sensory: Feels L face, L UE and L LE is only 80% feeling compared to the R  Reflexes: 2+ throughout; Down going toes  Coordination: Good FTN and HTS  Gait: deferred           Impression:     Carolina Schneider is a 54 y.o. male who  has a past medical history of Hypertension. who comes in with 2 weeks of L sided numbness from face down to his L LE described as loss of sensation. (-) pain (+) stuttering speech. Considerations include paresthesia, non-specific etiology including non-organic etiology (I.e stress - has special needs child)    MRI brain normal with no evidence of stroke or abnormal lesions    RECOMMENDATIONS  1. I had a long discussion with patient and wife. Discussed diagnosis, prognosis, pathophysiology and available treatment. Reviewed test results. All questions were answered. 2. Discussed MRI brain is normal  3. Advise to observe symptoms for now  4.  Consider MRI cervical spine if symptoms persist despite above (although patient denies neck pain and will not explain stuttering speech)      Please call for questions        Thank you for the consultation      Nikia Ott MD  Diplomate, American Board of Psychiatry and Neurology  Diplomate, Neuromuscular Medicine  Diplomate, American Board of Electrodiagnostic Medicine    Greater than 50% of time spent counseling patient        CC: Derek Esposito MD  Fax: [phone]

## 2021-10-25 ENCOUNTER — VIRTUAL VISIT (OUTPATIENT)
Dept: FAMILY MEDICINE CLINIC | Age: 51
End: 2021-10-25
Payer: MEDICAID

## 2021-10-25 DIAGNOSIS — Z09 HOSPITAL DISCHARGE FOLLOW-UP: Primary | ICD-10-CM

## 2021-10-25 DIAGNOSIS — N18.2 CKD (CHRONIC KIDNEY DISEASE) STAGE 2, GFR 60-89 ML/MIN: ICD-10-CM

## 2021-10-25 DIAGNOSIS — D72.819 LEUKOPENIA, UNSPECIFIED TYPE: ICD-10-CM

## 2021-10-25 DIAGNOSIS — E87.6 HYPOKALEMIA: ICD-10-CM

## 2021-10-25 DIAGNOSIS — R20.2 PARESTHESIA OF LEFT UPPER AND LOWER EXTREMITY: ICD-10-CM

## 2021-10-25 DIAGNOSIS — I10 ESSENTIAL HYPERTENSION: ICD-10-CM

## 2021-10-25 PROCEDURE — 1111F DSCHRG MED/CURRENT MED MERGE: CPT | Performed by: STUDENT IN AN ORGANIZED HEALTH CARE EDUCATION/TRAINING PROGRAM

## 2021-10-25 PROCEDURE — 99441 PR PHYS/QHP TELEPHONE EVALUATION 5-10 MIN: CPT | Performed by: STUDENT IN AN ORGANIZED HEALTH CARE EDUCATION/TRAINING PROGRAM

## 2021-10-25 NOTE — PROGRESS NOTES
[address] Medicine Residency Attending Addendum:  Dr. Ingris Vergara, DO,  the patient and I were not physically present during this encounter. The resident and I are concurrently monitoring the patient care through appropriate telecommunication technology. I discussed the findings, assessment and plan with the resident and agree with the resident's findings and plan as documented in the resident's note.       Paul Roca MD

## 2021-10-25 NOTE — PROGRESS NOTES
Laura Mirza  54 y.o. male  1970  [address]  [address] [facility]  [id number]    [phone] (home)      [address]:    Telephone Encounter  Rodriguez Grissom       Encounter Date: 10/25/2021 at 10:31 AM    Consent: Laura Mirza, who was seen by synchronous (real-time) audio only technology, and/or his healthcare decision maker, is aware that this patient-initiated, Telehealth encounter on 10/25/2021 is a billable service, with coverage as determined by his insurance carrier. He is aware that he may receive a bill and has provided verbal consent to proceed: Yes. Chief Complaint   Patient presents with   [facility] Follow Up       History of Present Illness   Laura Mirza is a 54 y.o. male was evaluated by telephone. I communicated with the patient and/or health care decision maker about his hospital follow up. Patient was admitted to [facility] from 10/18-10/19 for CVA/TIA work up. He initially presented with L sided numbness. MRI brain was normal and patient was discharged home with the instructions to observe his symptoms. Today he reports improvement of his numbness. It is still there however is not has severe as to when he first was admitted. He has finished his course of potassium. He has not been checking his BP at home but intends to do so later today. He also is planning to follow up with neurology. Patient was not contacted by the nursing navigator     Review of Systems   Review of Systems   Neurological: Positive for sensory change. Vitals/Objective:   General: Patient speaking in complete sentences without effort. Normal speech and cooperative. Due to this being a Virtual Check-in/Telephone evaluation, many elements of the physical examination are unable to be assessed. Assessment and Plan:   54 y.o. M presenting for hospital follow up. Total Time: minutes: 5-10 minutes    1.  Hospital discharge follow-up  - Patient reports improvement of symptoms. Intends to follow up with neurology if symptoms fail to improve. Can consider MRI of spine at that time  - MA DISCHARGE MEDS RECONCILED W/ CURRENT OUTPATIENT MED LIST    2. CKD (chronic kidney disease) stage 2, GFR 60-89 ml/min  - D/c Cr 1.23. Will check for improvement  - METABOLIC PANEL, BASIC; Future    3. Hypokalemia  - S/p treatment with KCL 20meq x 3 days. Will repeat K level  - METABOLIC PANEL, BASIC; Future    4. Essential hypertension  - Was elevated during hospital stay. Has not been checking at home. Patient encouraged to check BP once daily and keep a log  - Will need an in person follow up to address BP regimen    5. Leukopenia, unspecified type  - Peripheral smear notable for neutropenia. HIV was also negative. Chronic in nature. Will repeat CBC and check for improvement and work up pending findings  - CBC WITH AUTOMATED DIFF; Future      Patient informed to follow up: for lab work    I affirm this is a Patient Initiated Episode with an Established Patient who has not had a related appointment within my department in the past 7 days or scheduled within the next 24 hours. Note: not billable if this call serves to triage the patient into an appointment for the relevant concern      Electronically Signed: Lynn Oden DO  Providers location when delivering service: Office    CPT:  94472 (5-10 minutes)  [phone] (11-20 minutes)  21  (21-30 minutes)    Medicare:  [address]      ICD-10-CM ICD-9-CM    1. Hospital discharge follow-up  Z09 V67.59 MA DISCHARGE MEDS RECONCILED W/ CURRENT OUTPATIENT MED LIST   2. Paresthesia of left upper and lower extremity  R20.2 782.0    3. CKD (chronic kidney disease) stage 2, GFR 60-89 ml/min  C74.0 100.8 METABOLIC PANEL, COMPREHENSIVE      CANCELED: METABOLIC PANEL, BASIC   4. Essential hypertension  I10 401.9    5. Hypokalemia  S28.9 137.0 METABOLIC PANEL, COMPREHENSIVE      CANCELED: METABOLIC PANEL, BASIC   6. Leukopenia, unspecified type  D72.819 288.50 CBC WITH AUTOMATED DIFF       Pursuant to the emergency declaration under the [facility], [facility] waiver authority and the [employer] Act, this Virtual  Visit was conducted, with patient's consent, to reduce the patient's risk of exposure to COVID-19 and provide continuity of care for an established patient. History   Patients past medical, surgical and family histories were personally reviewed and updated. Past Medical History:   Diagnosis Date    Hypertension      Past Surgical History:   Procedure Laterality Date    HX APPENDECTOMY      HX HEENT      tonsillectomy     Family History   Problem Relation Age of Onset    Hypertension Mother     Diabetes Mother     Hypertension Father      Social History     Tobacco Use    Smoking status: Never Smoker    Smokeless tobacco: Never Used   Substance Use Topics    Alcohol use: Yes    Drug use: No              Current Medications/Allergies   Medications and Allergies reviewed:    Current Outpatient Medications   Medication Sig Dispense Refill    atorvastatin (LIPITOR) 20 mg tablet Take 1 Tablet by mouth nightly for 30 days. 30 Tablet 0    amLODIPine (NORVASC) 10 mg tablet Take 1 Tab by mouth daily. 60 Tab 1    Blood Pressure Test Kit-Large (QUICK RESPONSE BP MONITOR) kit 1 Kit by Does Not Apply route daily.  1 Kit 0    fluticasone (FLONASE) 50 mcg/actuation nasal spray 2 puffs each nostril at bedtime 1 Bottle 3     No Known Allergies

## 2021-10-26 ENCOUNTER — TELEPHONE (OUTPATIENT)
Dept: FAMILY MEDICINE CLINIC | Age: 51
End: 2021-10-26

## 2021-10-26 NOTE — TELEPHONE ENCOUNTER
----- Message from Kevyn Hand DO sent at 10/25/2021 10:38 AM EDT -----  Regarding: Lab appt  Hello    This patient will need an appt for lab work.  Thank you    TIFFANY

## 2022-02-04 ENCOUNTER — OFFICE VISIT (OUTPATIENT)
Dept: FAMILY MEDICINE CLINIC | Age: 52
End: 2022-02-04
Payer: MEDICAID

## 2022-02-04 ENCOUNTER — LAB ONLY (OUTPATIENT)
Dept: FAMILY MEDICINE CLINIC | Age: 52
End: 2022-02-04

## 2022-02-04 VITALS
HEIGHT: 73 IN | RESPIRATION RATE: 17 BRPM | DIASTOLIC BLOOD PRESSURE: 85 MMHG | HEART RATE: 68 BPM | WEIGHT: 240.2 LBS | BODY MASS INDEX: 31.83 KG/M2 | OXYGEN SATURATION: 96 % | SYSTOLIC BLOOD PRESSURE: 175 MMHG

## 2022-02-04 DIAGNOSIS — N18.2 CKD (CHRONIC KIDNEY DISEASE) STAGE 2, GFR 60-89 ML/MIN: ICD-10-CM

## 2022-02-04 DIAGNOSIS — E87.6 HYPOKALEMIA: ICD-10-CM

## 2022-02-04 DIAGNOSIS — D72.819 LEUKOPENIA, UNSPECIFIED TYPE: ICD-10-CM

## 2022-02-04 DIAGNOSIS — I51.7 LEFT VENTRICULAR HYPERTROPHY: ICD-10-CM

## 2022-02-04 DIAGNOSIS — I10 ESSENTIAL HYPERTENSION: Primary | ICD-10-CM

## 2022-02-04 PROCEDURE — 99214 OFFICE O/P EST MOD 30 MIN: CPT | Performed by: STUDENT IN AN ORGANIZED HEALTH CARE EDUCATION/TRAINING PROGRAM

## 2022-02-04 RX ORDER — AMLODIPINE BESYLATE 10 MG/1
10 TABLET ORAL DAILY
Qty: 90 TABLET | Refills: 1 | Status: SHIPPED | OUTPATIENT
Start: 2022-02-04 | End: 2022-07-01 | Stop reason: SDUPTHER

## 2022-02-04 RX ORDER — LISINOPRIL 20 MG/1
20 TABLET ORAL DAILY
Qty: 30 TABLET | Refills: 0 | Status: SHIPPED | OUTPATIENT
Start: 2022-02-04 | End: 2022-03-03

## 2022-02-04 NOTE — PATIENT INSTRUCTIONS
Dr. Jean Claude Saldana (Cardiology): [phone]     DASH Diet: Care Instructions  Your Care Instructions     The DASH diet is an eating plan that can help lower your blood pressure. DASH stands for Dietary Approaches to Stop Hypertension. Hypertension is high blood pressure. The DASH diet focuses on eating foods that are high in calcium, potassium, and magnesium. These nutrients can lower blood pressure. The foods that are highest in these nutrients are fruits, vegetables, low-fat dairy products, nuts, seeds, and legumes. But taking calcium, potassium, and magnesium supplements instead of eating foods that are high in those nutrients does not have the same effect. The DASH diet also includes whole grains, fish, and poultry. The DASH diet is one of several lifestyle changes your doctor may recommend to lower your high blood pressure. Your doctor may also want you to decrease the amount of sodium in your diet. Lowering sodium while following the DASH diet can lower blood pressure even further than just the DASH diet alone. Follow-up care is a key part of your treatment and safety. Be sure to make and go to all appointments, and call your doctor if you are having problems. It's also a good idea to know your test results and keep a list of the medicines you take. How can you care for yourself at home? Following the DASH diet  · Eat 4 to 5 servings of fruit each day. A serving is 1 medium-sized piece of fruit, ½ cup chopped or canned fruit, 1/4 cup dried fruit, or 4 ounces (½ cup) of fruit juice. Choose fruit more often than fruit juice. · Eat 4 to 5 servings of vegetables each day. A serving is 1 cup of lettuce or raw leafy vegetables, ½ cup of chopped or cooked vegetables, or 4 ounces (½ cup) of vegetable juice. Choose vegetables more often than vegetable juice. · Get 2 to 3 servings of low-fat and fat-free dairy each day. A serving is 8 ounces of milk, 1 cup of yogurt, or 1 ½ ounces of cheese.   · Eat 6 to 8 servings of grains each day. A serving is 1 slice of bread, 1 ounce of dry cereal, or ½ cup of cooked rice, pasta, or cooked cereal. Try to choose whole-grain products as much as possible. · Limit lean meat, poultry, and fish to 2 servings each day. A serving is 3 ounces, about the size of a deck of cards. · Eat 4 to 5 servings of nuts, seeds, and legumes (cooked dried beans, lentils, and split peas) each week. A serving is 1/3 cup of nuts, 2 tablespoons of seeds, or ½ cup of cooked beans or peas. · Limit fats and oils to 2 to 3 servings each day. A serving is 1 teaspoon of vegetable oil or 2 tablespoons of salad dressing. · Limit sweets and added sugars to 5 servings or less a week. A serving is 1 tablespoon jelly or jam, ½ cup sorbet, or 1 cup of lemonade. · Eat less than 2,300 milligrams (mg) of sodium a day. If you limit your sodium to 1,500 mg a day, you can lower your blood pressure even more. · Be aware that all of these are the suggested number of servings for people who eat 1,800 to 2,000 calories a day. Your recommended number of servings may be different if you need more or fewer calories. Tips for success  · Start small. Do not try to make dramatic changes to your diet all at once. You might feel that you are missing out on your favorite foods and then be more likely to not follow the plan. Make small changes, and stick with them. Once those changes become habit, add a few more changes. · Try some of the following:  ? Make it a goal to eat a fruit or vegetable at every meal and at snacks. This will make it easy to get the recommended amount of fruits and vegetables each day. ? Try yogurt topped with fruit and nuts for a snack or healthy dessert. ? Add lettuce, tomato, cucumber, and onion to sandwiches. ? Combine a ready-made pizza crust with low-fat mozzarella cheese and lots of vegetable toppings. Try using tomatoes, squash, spinach, broccoli, carrots, cauliflower, and onions. ?  Have a variety of cut-up vegetables with a low-fat dip as an appetizer instead of chips and dip. ? Sprinkle sunflower seeds or chopped almonds over salads. Or try adding chopped walnuts or almonds to cooked vegetables. ? Try some vegetarian meals using beans and peas. Add garbanzo or kidney beans to salads. Make burritos and tacos with mashed [place] beans or black beans. Where can you learn more? Go to http://www.whyte.com/  Enter H967 in the search box to learn more about \"DASH Diet: Care Instructions. \"  Current as of: April 29, 2021               Content Version: 13.0  © 2006-2021 Healthwise, [employer]. Care instructions adapted under license by [employer] (which disclaims liability or warranty for this information). If you have questions about a medical condition or this instruction, always ask your healthcare professional. [facility] [address] any warranty or liability for your use of this information.

## 2022-02-04 NOTE — PROGRESS NOTES
Leela Cooney is a 54 y.o. male    Chief Complaint   Patient presents with   [facility] Follow-up       1. Have you been to the ER, urgent care clinic since your last visit? Hospitalized since your last visit? No  2. Have you seen or consulted any other health care providers outside of the [address] [place] [address] since your last visit? Include any pap smears or colon screening.  No    Visit Vitals  BP (!) 175/85 (BP 1 Location: Right arm, BP Patient Position: Sitting)   Pulse 68   Resp 17   Ht 6' 1\" (1.854 m)   Wt 240 lb 3.2 oz (109 kg)   SpO2 96%   BMI 31.69 kg/m²       Health Maintenance Due   Topic Date Due    Hepatitis C Screening  Never done    COVID-19 Vaccine (1) Never done    Colorectal Cancer Screening Combo  Never done    Depression Screen  04/21/2021    Flu Vaccine (1) Never done

## 2022-02-04 NOTE — PROGRESS NOTES
[address] [facility] [address]   Office [phone], Fax [phone]    Subjective:     Chief Complaint   Patient presents with    ED Follow-up     History provided by patient     HPI:  Lelia Pradhan is a 54 y.o. BLACK/ male with medical history of HTN, HLD, and CKD stage II presents for   Chief Complaint   Patient presents with   [place] ED Follow-up       Mr. Obed Connor presents to clinic for evaluation of his Hypertension. He was seen in the ED in October 2021 and was asked to follow up with his PCP. He was unable to make it in until now. He says that he does not check his BP at home. He has not been exercising and says that he has not been doing well with his diet. He is currently taking Norvasc 10mg daily but he says that he sometimes does not remember to take his medication. Social History     Socioeconomic History    Marital status: SINGLE     Spouse name: Not on file    Number of children: Not on file    Years of education: Not on file    Highest education level: Not on file   Occupational History    Not on file   Tobacco Use    Smoking status: Never Smoker    Smokeless tobacco: Never Used   Substance and Sexual Activity    Alcohol use:  Yes    Drug use: No    Sexual activity: Yes     Partners: Female     Birth control/protection: Condom   Other Topics Concern     Service Not Asked    Blood Transfusions Not Asked    Caffeine Concern Not Asked    Occupational Exposure Not Asked    Hobby Hazards Not Asked    Sleep Concern Not Asked    Stress Concern Not Asked    Weight Concern Not Asked    Special Diet Not Asked    Back Care Not Asked    Exercise Not Asked    Bike Helmet Not Asked   [address] Not Asked    Self-Exams Not Asked   Social History Narrative    Not on file     Social Determinants of Health     Financial Resource Strain:     Difficulty of Paying Living Expenses: Not on file   Food Insecurity:     Worried About Running Out of Food in the Last Year: Not on file    Ran Out of Food in the Last Year: Not on file   Transportation Needs:     Lack of Transportation (Medical): Not on file    Lack of Transportation (Non-Medical): Not on file   Physical Activity:     Days of Exercise per Week: Not on file    Minutes of Exercise per Session: Not on file   Stress:     Feeling of Stress : Not on file   Social Connections:     Frequency of Communication with Friends and Family: Not on file    Frequency of Social Gatherings with Friends and Family: Not on file    Attends Spiritism Services: Not on file    Active Member of [address] [place] [address] or Organizations: Not on file    Attends Club or Organization Meetings: Not on file    Marital Status: Not on file   Intimate Partner Violence:     Fear of Current or Ex-Partner: Not on file    Emotionally Abused: Not on file    Physically Abused: Not on file    Sexually Abused: Not on file   Housing Stability:     Unable to Pay for Housing in the Last Year: Not on file    Number of [place] in the Last Year: Not on file    Unstable Housing in the Last Year: Not on file     Review of Systems   Constitutional: Negative for chills and fever. Respiratory: Negative for cough and shortness of breath. Cardiovascular: Negative for chest pain and leg swelling. Gastrointestinal: Negative for abdominal pain, constipation, diarrhea, nausea and vomiting. Genitourinary: Negative for dysuria, frequency and urgency. Skin: Negative for itching and rash. Neurological: Negative for dizziness and headaches. Objective:     Visit Vitals  BP (!) 175/85 (BP 1 Location: Right arm, BP Patient Position: Sitting)   Pulse 68   Resp 17   Ht 6' 1\" (1.854 m)   Wt 240 lb 3.2 oz (109 kg)   SpO2 96%   BMI 31.69 kg/m²      Physical Exam  Constitutional:       Appearance: Normal appearance. HENT:      Head: Normocephalic and atraumatic. Cardiovascular:      Rate and Rhythm: Normal rate and regular rhythm.       Pulses: Normal pulses. Heart sounds: Normal heart sounds. Pulmonary:      Effort: Pulmonary effort is normal.      Breath sounds: Normal breath sounds. Abdominal:      General: Abdomen is flat. Bowel sounds are normal.      Palpations: Abdomen is soft. Skin:     General: Skin is warm and dry. Neurological:      General: No focal deficit present. Mental Status: He is alert and oriented to person, place, and time. Pertinent Tests: EKG (10/2021) with Sinus Bradycardia with 1st degree AV block and with LVH  Assessment and orders:       ICD-10-CM ICD-9-CM    1. Essential hypertension  I10 401.9 lisinopriL (PRINIVIL, ZESTRIL) 20 mg tablet      amLODIPine (NORVASC) 10 mg tablet   2. Left ventricular hypertrophy  I51.7 429.3 REFERRAL TO CARDIOLOGY     1. Hypertension: BP today 175/85 on presentation, downtrended to 155/89 when we rechecked during my interview. He is currently on Amlodipine 10mg daily. - Refilled Amlodipine 10mg daily   - Start on Lisinopril 20mg daily  - Discussed importance of checking BP at home  - Discussed DASH diet  - Discussed importance of exercise and goal of 30 minutes 5 times a week    2. LVH: seen on previous EKG from October 2021  - Referral to Cardiology for further workup if warranted     Labs, imaging and immunization ordered as above    Follow Up: in 2 weeks    Pt was discussed with Dr. Zhang Peter (attending physician). I have reviewed patient medical and social history and medications. I have reviewed pertinent labs results and other data. I have discussed the diagnosis with the patient and the intended plan as seen in the above orders. The patient has received an after-visit summary and questions were answered concerning future plans. I have discussed medication side effects and warnings with the patient as well.     Carolin Lock MD  [facility]  02/09/22

## 2022-02-05 LAB
ALBUMIN SERPL-MCNC: 4.7 G/DL (ref 4–5)
ALBUMIN/GLOB SERPL: 1.9 {RATIO} (ref 1.2–2.2)
ALP SERPL-CCNC: 66 IU/L (ref 44–121)
ALT SERPL-CCNC: 25 IU/L (ref 0–44)
AST SERPL-CCNC: 34 IU/L (ref 0–40)
BASOPHILS # BLD AUTO: 0 X10E3/UL (ref 0–0.2)
BASOPHILS NFR BLD AUTO: 1 %
BILIRUB SERPL-MCNC: 0.5 MG/DL (ref 0–1.2)
BUN SERPL-MCNC: 11 MG/DL (ref 6–24)
BUN/CREAT SERPL: 9 (ref 9–20)
CALCIUM SERPL-MCNC: 8.9 MG/DL (ref 8.7–10.2)
CHLORIDE SERPL-SCNC: 99 MMOL/L (ref 96–106)
CO2 SERPL-SCNC: 30 MMOL/L (ref 20–29)
CREAT SERPL-MCNC: 1.29 MG/DL (ref 0.76–1.27)
EOSINOPHIL # BLD AUTO: 0.1 X10E3/UL (ref 0–0.4)
EOSINOPHIL NFR BLD AUTO: 4 %
ERYTHROCYTE [DISTWIDTH] IN BLOOD BY AUTOMATED COUNT: 13.9 % (ref 11.6–15.4)
GLOBULIN SER CALC-MCNC: 2.5 G/DL (ref 1.5–4.5)
GLUCOSE SERPL-MCNC: 93 MG/DL (ref 65–99)
HCT VFR BLD AUTO: 39.2 % (ref 37.5–51)
HGB BLD-MCNC: 13.3 G/DL (ref 13–17.7)
IMM GRANULOCYTES # BLD AUTO: 0 X10E3/UL (ref 0–0.1)
IMM GRANULOCYTES NFR BLD AUTO: 0 %
LYMPHOCYTES # BLD AUTO: 1.5 X10E3/UL (ref 0.7–3.1)
LYMPHOCYTES NFR BLD AUTO: 53 %
MCH RBC QN AUTO: 26.1 PG (ref 26.6–33)
MCHC RBC AUTO-ENTMCNC: 33.9 G/DL (ref 31.5–35.7)
MCV RBC AUTO: 77 FL (ref 79–97)
MONOCYTES # BLD AUTO: 0.3 X10E3/UL (ref 0.1–0.9)
MONOCYTES NFR BLD AUTO: 10 %
MORPHOLOGY BLD-IMP: ABNORMAL
NEUTROPHILS # BLD AUTO: 0.9 X10E3/UL (ref 1.4–7)
NEUTROPHILS NFR BLD AUTO: 32 %
PLATELET # BLD AUTO: 306 X10E3/UL (ref 150–450)
POTASSIUM SERPL-SCNC: 3 MMOL/L (ref 3.5–5.2)
PROT SERPL-MCNC: 7.2 G/DL (ref 6–8.5)
RBC # BLD AUTO: 5.09 X10E6/UL (ref 4.14–5.8)
SODIUM SERPL-SCNC: 144 MMOL/L (ref 134–144)
WBC # BLD AUTO: 2.8 X10E3/UL (ref 3.4–10.8)

## 2022-03-03 DIAGNOSIS — I10 ESSENTIAL HYPERTENSION: ICD-10-CM

## 2022-03-03 RX ORDER — LISINOPRIL 20 MG/1
TABLET ORAL
Qty: 30 TABLET | Refills: 0 | Status: SHIPPED | OUTPATIENT
Start: 2022-03-03 | End: 2022-06-02 | Stop reason: ALTCHOICE

## 2022-03-18 PROBLEM — R79.89 ELEVATED SERUM CREATININE: Status: ACTIVE | Noted: 2021-10-19

## 2022-03-18 PROBLEM — N18.2 CKD (CHRONIC KIDNEY DISEASE) STAGE 2, GFR 60-89 ML/MIN: Status: ACTIVE | Noted: 2021-10-19

## 2022-03-19 PROBLEM — R77.8 ELEVATED TROPONIN: Status: ACTIVE | Noted: 2021-10-18

## 2022-03-19 PROBLEM — R20.2 PARESTHESIA OF LEFT UPPER AND LOWER EXTREMITY: Status: ACTIVE | Noted: 2021-10-18

## 2022-03-19 PROBLEM — I10 ESSENTIAL HYPERTENSION: Status: ACTIVE | Noted: 2018-12-09

## 2022-03-19 PROBLEM — R73.09 ELEVATED GLUCOSE: Status: ACTIVE | Noted: 2018-12-09

## 2022-03-19 PROBLEM — R79.89 ELEVATED TROPONIN: Status: ACTIVE | Noted: 2021-10-18

## 2022-03-20 PROBLEM — E78.9 LIPID DISORDER: Status: ACTIVE | Noted: 2018-12-09

## 2022-03-20 PROBLEM — D72.819 LEUKOPENIA: Status: ACTIVE | Noted: 2021-10-19

## 2022-04-22 ENCOUNTER — OFFICE VISIT (OUTPATIENT)
Dept: CARDIOLOGY CLINIC | Age: 52
End: 2022-04-22
Payer: MEDICAID

## 2022-04-22 VITALS
SYSTOLIC BLOOD PRESSURE: 170 MMHG | HEIGHT: 73 IN | DIASTOLIC BLOOD PRESSURE: 100 MMHG | BODY MASS INDEX: 32.2 KG/M2 | HEART RATE: 67 BPM | OXYGEN SATURATION: 97 % | WEIGHT: 243 LBS

## 2022-04-22 DIAGNOSIS — Z76.89 ESTABLISHING CARE WITH NEW DOCTOR, ENCOUNTER FOR: Primary | ICD-10-CM

## 2022-04-22 DIAGNOSIS — R06.83 SNORING: ICD-10-CM

## 2022-04-22 DIAGNOSIS — I51.7 LVH (LEFT VENTRICULAR HYPERTROPHY): ICD-10-CM

## 2022-04-22 DIAGNOSIS — N18.2 CKD (CHRONIC KIDNEY DISEASE) STAGE 2, GFR 60-89 ML/MIN: ICD-10-CM

## 2022-04-22 DIAGNOSIS — I10 ESSENTIAL HYPERTENSION: ICD-10-CM

## 2022-04-22 PROCEDURE — 93000 ELECTROCARDIOGRAM COMPLETE: CPT | Performed by: INTERNAL MEDICINE

## 2022-04-22 PROCEDURE — 99203 OFFICE O/P NEW LOW 30 MIN: CPT | Performed by: INTERNAL MEDICINE

## 2022-04-22 RX ORDER — SPIRONOLACTONE 25 MG/1
25 TABLET ORAL DAILY
Qty: 90 TABLET | Refills: 4 | Status: SHIPPED | OUTPATIENT
Start: 2022-04-22 | End: 2022-06-02 | Stop reason: SDUPTHER

## 2022-04-22 NOTE — PROGRESS NOTES
Start Aldactone 25mg po daily     Echo for HTN and LVH. Refer to Dr. Gillian Carl for sleep evaluation. Referral given to the pt  See Dr. Carmella Tolentino in 2-3 weeks.

## 2022-04-22 NOTE — PROGRESS NOTES
Reason to see patient: HTN    Referring: Luis Angel Ty MD    HPI: Florencia Dancer is a 54 y.o. male with past medical history significant for hypertension is here for evaluation. I had last seen him about 3 years ago. He was lost to follow-up. He is being referred again because of abnormal EKG with LVH strain pattern. His blood pressure is still poorly controlled. From symptom standpoint he does not have any symptoms of chest pain, lightheadedness, palpitations, dizziness, presyncope or syncope. He will occasionally feel shortness of breath on exertion particularly when he is dancing. He does not smoke tobacco.    EKG in my office today demonstrated normal sinus rhythm with LVH pattern with strain pattern in the inferolateral leads. Echocardiogram in 2018 demonstrated mild concentric left ventricular hypertrophy with global strain of 12%. Plan:    1. Uncontrolled hypertension: Currently he is on lisinopril 20 mg p.o. daily and amlodipine 10 mg p.o. daily. I will add Aldactone 25 p.o. daily. Continue to monitor salt intake. Monitor blood pressure at home. Check echocardiogram to compare previous noted LVH. GFR is 75 with Cr oif 1.29. K is 3.0. Monitor. Check home BP readings and bring log next time. 2.  History of snoring. We will refer him to Dr. Christiana Fields. 3.  See Dr. Anibal Mendes in 2-3 weeks. He is self employed and works cleaning offices. ATTENTION:   This medical record was transcribed using an electronic medical records/speech recognition system. Although proofread, it may and can contain electronic, spelling and other errors. Corrections may be executed at a later time. Please feel free to contact us for any clarifications as needed.       Past Medical History:   Diagnosis Date    Hypertension             Past Surgical History:   Procedure Laterality Date    HX APPENDECTOMY      HX HEENT      tonsillectomy             Family History   Problem Relation Age of Onset    Hypertension Mother     Diabetes Mother     Hypertension Father            Social History     Socioeconomic History    Marital status: SINGLE     Spouse name: Not on file    Number of children: Not on file    Years of education: Not on file    Highest education level: Not on file   Occupational History    Not on file   Tobacco Use    Smoking status: Never Smoker    Smokeless tobacco: Never Used   Substance and Sexual Activity    Alcohol use: Yes    Drug use: No    Sexual activity: Yes     Partners: Female     Birth control/protection: Condom   Other Topics Concern     Service Not Asked    Blood Transfusions Not Asked    Caffeine Concern Not Asked    Occupational Exposure Not Asked    Hobby Hazards Not Asked    Sleep Concern Not Asked    Stress Concern Not Asked    Weight Concern Not Asked    Special Diet Not Asked    Back Care Not Asked    Exercise Not Asked    Bike Helmet Not Asked   [address] Not Asked    Self-Exams Not Asked   Social History Narrative    Not on file     Social Determinants of Health     Financial Resource Strain:     Difficulty of Paying Living Expenses: Not on file   Food Insecurity:     Worried About Running Out of Food in the Last Year: Not on file    Marlo of Food in the Last Year: Not on file   Transportation Needs:     Lack of Transportation (Medical): Not on file    Lack of Transportation (Non-Medical):  Not on file   Physical Activity:     Days of Exercise per Week: Not on file    Minutes of Exercise per Session: Not on file   Stress:     Feeling of Stress : Not on file   Social Connections:     Frequency of Communication with Friends and Family: Not on file    Frequency of Social Gatherings with Friends and Family: Not on file    Attends Orthodox Services: Not on file    Active Member of Clubs or Organizations: Not on file    Attends Club or Organization Meetings: Not on file    Marital Status: Not on file   Intimate Partner Violence:  Fear of Current or Ex-Partner: Not on file    Emotionally Abused: Not on file    Physically Abused: Not on file    Sexually Abused: Not on file   Housing Stability:     Unable to Pay for Housing in the Last Year: Not on file    Number of Places Lived in the Last Year: Not on file    Unstable Housing in the Last Year: Not on file         No Known Allergies         Current Outpatient Medications   Medication Sig Dispense Refill    lisinopriL (PRINIVIL, ZESTRIL) 20 mg tablet TAKE 1 TABLET BY MOUTH EVERY DAY 30 Tablet 0    amLODIPine (NORVASC) 10 mg tablet Take 1 Tablet by mouth daily. 90 Tablet 1    Blood Pressure Test Kit-Large (QUICK RESPONSE BP MONITOR) kit 1 Kit by Does Not Apply route daily. 1 Kit 0    fluticasone (FLONASE) 50 mcg/actuation nasal spray 2 puffs each nostril at bedtime (Patient taking differently: as needed. 2 puffs each nostril at bedtime) 1 Bottle 3        ROS:  12 point review of systems was performed.  All negative except for HPI     Physical Exam:  Visit Vitals  BP (!) 170/100 (BP 1 Location: Left upper arm, BP Patient Position: Sitting, BP Cuff Size: Adult)   Pulse 67   Ht 6' 1\" (1.854 m)   Wt 243 lb (110.2 kg)   SpO2 97%   BMI 32.06 kg/m²       Gen:  Well-developed, well-nourished, in no acute distress  HEENT:  Pink conjunctivae, PERRL, hearing intact to voice, moist mucous membranes  Neck:  Supple, without masses, thyroid non-tender  Resp:  No accessory muscle use, clear breath sounds without wheezes rales or rhonchi  Card:  No murmurs, normal S1, S2 without thrills, bruits or peripheral edema  Abd:  Soft, non-tender, non-distended, normoactive bowel sounds are present, no palpable organomegaly and no detectable hernias  Lymph:  No cervical or inguinal adenopathy  Musc:  No cyanosis or clubbing  Skin:  No rashes or ulcers, skin turgor is good  Neuro:  Cranial nerves are grossly intact, no focal motor weakness, follows commands appropriately  Psych:  Good insight, oriented to person, place and time, alert     Labs:     Lab Results   Component Value Date/Time    WBC 2.8 (L) 02/04/2022 12:00 AM    HGB 13.3 02/04/2022 12:00 AM    HCT 39.2 02/04/2022 12:00 AM    PLATELET 491 35/46/0299 12:00 AM    MCV 77 (L) 02/04/2022 12:00 AM     Lab Results   Component Value Date/Time    Hemoglobin A1c 5.4 10/18/2021 07:39 PM    Hemoglobin A1c 5.6 12/06/2018 02:22 PM    Glucose 93 02/04/2022 12:00 AM    LDL, calculated 100.4 (H) 10/19/2021 01:10 AM    Creatinine 1.29 (H) 02/04/2022 12:00 AM      Lab Results   Component Value Date/Time    Cholesterol, total 184 10/19/2021 01:10 AM    HDL Cholesterol 48 10/19/2021 01:10 AM    LDL, calculated 100.4 (H) 10/19/2021 01:10 AM    Triglyceride 178 (H) 10/19/2021 01:10 AM    CHOL/HDL Ratio 3.8 10/19/2021 01:10 AM     Lab Results   Component Value Date/Time    ALT (SGPT) 25 02/04/2022 12:00 AM    Alk.  phosphatase 66 02/04/2022 12:00 AM    Bilirubin, total 0.5 02/04/2022 12:00 AM    Albumin 4.7 02/04/2022 12:00 AM    Protein, total 7.2 02/04/2022 12:00 AM    INR 1.0 10/18/2021 07:39 PM    Prothrombin time 10.3 10/18/2021 07:39 PM    PLATELET 200 30/52/4005 12:00 AM     Lab Results   Component Value Date/Time    INR 1.0 10/18/2021 07:39 PM    Prothrombin time 10.3 10/18/2021 07:39 PM      Lab Results   Component Value Date/Time    GFR est non-AA 65 02/04/2022 12:00 AM    GFR est AA 75 02/04/2022 12:00 AM    Creatinine 1.29 (H) 02/04/2022 12:00 AM    BUN 11 02/04/2022 12:00 AM    Sodium 144 02/04/2022 12:00 AM    Potassium 3.0 (L) 02/04/2022 12:00 AM    Chloride 99 02/04/2022 12:00 AM    CO2 30 (H) 02/04/2022 12:00 AM    Magnesium 2.1 10/19/2021 01:10 AM     No results found for: PSA, Natali Riff, [id number], [id number]  Lab Results   Component Value Date/Time    TSH 3.42 10/19/2021 01:10 AM      Lab Results   Component Value Date/Time    Glucose 93 02/04/2022 12:00 AM      No results found for: CPK, RCK1, RCK2, RCK3, RCK4, CKMB, CKNDX, CKND1, TROPT, TROIQ, BNPP, BNP   No results found for: BNP, BNPP, BNPPPOC, XBNPT, BNPNT   Lab Results   Component Value Date/Time    Sodium 144 02/04/2022 12:00 AM    Potassium 3.0 (L) 02/04/2022 12:00 AM    Chloride 99 02/04/2022 12:00 AM    CO2 30 (H) 02/04/2022 12:00 AM    Anion gap 5 10/19/2021 10:48 AM    Glucose 93 02/04/2022 12:00 AM    BUN 11 02/04/2022 12:00 AM    Creatinine 1.29 (H) 02/04/2022 12:00 AM    BUN/Creatinine ratio 9 02/04/2022 12:00 AM    GFR est AA 75 02/04/2022 12:00 AM    GFR est non-AA 65 02/04/2022 12:00 AM    Calcium 8.9 02/04/2022 12:00 AM      Lab Results   Component Value Date/Time    Sodium 144 02/04/2022 12:00 AM    Potassium 3.0 (L) 02/04/2022 12:00 AM    Chloride 99 02/04/2022 12:00 AM    CO2 30 (H) 02/04/2022 12:00 AM    Anion gap 5 10/19/2021 10:48 AM    Glucose 93 02/04/2022 12:00 AM    BUN 11 02/04/2022 12:00 AM    Creatinine 1.29 (H) 02/04/2022 12:00 AM    BUN/Creatinine ratio 9 02/04/2022 12:00 AM    GFR est AA 75 02/04/2022 12:00 AM    GFR est non-AA 65 02/04/2022 12:00 AM    Calcium 8.9 02/04/2022 12:00 AM    Bilirubin, total 0.5 02/04/2022 12:00 AM    ALT (SGPT) 25 02/04/2022 12:00 AM    Alk. phosphatase 66 02/04/2022 12:00 AM    Protein, total 7.2 02/04/2022 12:00 AM    Albumin 4.7 02/04/2022 12:00 AM    Globulin 3.0 10/19/2021 01:10 AM    A-G Ratio 1.9 02/04/2022 12:00 AM      Lab Results   Component Value Date/Time    Hemoglobin A1c 5.4 10/18/2021 07:39 PM         No results for input(s): CPK, CKMB, TROIQ in the last 72 hours.     No lab exists for component: CKQMB, CPKMB        Problem List:     Problem List  Date Reviewed: 10/25/2021          Codes Class Noted    Leukopenia ICD-10-CM: D72.819  ICD-9-CM: 288.50  10/19/2021        CKD (chronic kidney disease) stage 2, GFR 60-89 ml/min ICD-10-CM: N18.2  ICD-9-CM: 585.2  10/19/2021        Elevated serum creatinine ICD-10-CM: R79.89  ICD-9-CM: 790.99  10/19/2021        Elevated troponin ICD-10-CM: R77.8  ICD-9-CM: 790.6 10/18/2021        Paresthesia of left upper and lower extremity ICD-10-CM: R20.2  ICD-9-CM: 782.0  10/18/2021        Lipid disorder ICD-10-CM: E78.9  ICD-9-CM: 272.9  12/9/2018    Overview Signed 12/9/2018 10:19 AM by Isi David V     12/2018:  a1c 5.6 /LDL 81/ MAB negative  Triglycerides up             Essential hypertension ICD-10-CM: I10  ICD-9-CM: 401.9  12/9/2018    Overview Signed 12/9/2018 10:21 AM by Isi David V     12/2018:  a1c 5.6 /LDL 81/ MAB negative/ Cr 1.12/ GFR 78             Elevated glucose ICD-10-CM: R73.09  ICD-9-CM: 790.29  12/9/2018    Overview Signed 12/9/2018 10:22 AM by Ivan Sanches GTT                     Eloy Street MD, [facility] [place]

## 2022-04-22 NOTE — PROGRESS NOTES
Sreedhar Carmichael is a 54 y.o. male    Visit Vitals  BP (!) 170/100 (BP 1 Location: Left upper arm, BP Patient Position: Sitting, BP Cuff Size: Adult)   Pulse 77   Ht 6' 1\" (1.854 m)   Wt 243 lb (110.2 kg)   SpO2 97%   BMI 32.06 kg/m²       Chief Complaint   Patient presents with    Hypertension    Other     LVH       Chest pain NO  SOB NO  Dizziness NO  Swelling NO  Recent hospital visit NO  Refills NO  COVID VACCINE STATUS NO  HAD COVID?  YES

## 2022-04-22 NOTE — PATIENT INSTRUCTIONS
Start Aldactone 25mg po daily    Echo for HTN and LVH. Refer to Dr. Darrell Montilla for sleep evaluation. See Dr. Wille Gaucher in 2-3 weeks. Instructions on Home BP monitoring:    Purchase a blood pressure cuff that goes around your upper arm. Write down your numbers for review. Check blood pressure in the morning and evening. Remain still:  Avoid smoking, caffeinated beverages, or exercise within 30 min before BP measurements. Ensure ? 5 min of quiet rest before BP measurements. Sit correctly:  Sit with back straight and supported (on a straight-backed dining chair, for example, rather than a sofa). Sit with feet flat on the floor and legs uncrossed. Keep arm supported on a flat surface (such as a table), with the upper arm at heart level. Bottom of the cuff should be placed directly above the antecubital fossa (bend of the elbow). Take multiple readings:  Take at least 2 readings 1 min apart in morning before taking medications and in evening before supper. Optimally, measure and record BP daily. Ideally, obtain weekly BP readings beginning 2 weeks after a change in the treatment regimen and during the week before a clinic visit. Record all readings accurately:  Monitors with built-in memory should be brought to all clinic appointments. BP should be based on an average of readings on ?2 occasions for clinical decision making. Other recommendations are as follows:    -Exercise 30-60 minutes most days of the week, preferably with a mix of cardiovascular and strength training. Exercise can improve blood pressure, even if you do not lose weight!  -Limit alcohol intake to less than 2 drinks daily   -Avoid tobacco products  -Avoid caffeine, energy drinks, stimulants  -DASH diet - includes fruits, vegetables, fiber, and less than 2000 mg sodium (salt) daily. Try to eat less than 3397-3190 calories daily.  Limit fat intake.   -Avoid non-steroidal inflammatory medications (NSAIDS) such as ibuprofen, advil, motrin, aleve, and naproxyn as these may increase blood pressure if used long-term  -Avoid OTC decongestants such as pseudopherine, phenylephrine, Afrin

## 2022-04-28 NOTE — PROGRESS NOTES
[address] [facility] [place] [place], [address]   Tel.  [phone]   Fax. [address] [facility]   [place], [address] [facility]   Tel.  [phone]   Fax. [phone] [address]   Tel.  [phone]   Fax. [phone] (: 1970) is a 54 y.o. male, established patient, seen for positive airway pressure follow-up and evaluation. He was last seen by Dr. Victor M Singh on 2020, prior notes reviewed in detail. Home sleep study ordered however never completed. He is seen today for follow up. ASSESSMENT/PLAN:    ICD-10-CM ICD-9-CM    1. DAYAN (obstructive sleep apnea)  G47.33 327.23 SLEEP STUDY UNATTENDED, 4 CHANNEL   2. Primary hypertension  I10 401.9    3. BMI 31.0-31.9,adult  Z68.31 V85.31      Sleep Apnea - Home sleep study ordered for evaluation. He reports he continues to snore and have frequent awakenings at night. He feels fatigued during the day but is very active. He notes he typically goes to sleep very quickly. He denies dreaming. He denies waking up gasping/choking. He has reservations about completing a home sleep study and would like to complete and in lab sleep study if possible. He is willing to try an HSAT first. He notes he has been seen by Cardiology to have his left ventricular function evaluated. He has also been having trouble managing his hypertension. Orders Placed This Encounter    SLEEP STUDY UNATTENDED, 4 CHANNEL     Order Specific Question:   Reason for Exam     Answer:   DAYAN     * Counseling was provided regarding the importance of ensuring sufficient total sleep time, proper sleep hygiene, and safe driving. 2. Hypertension -  continue on his current regimen, he will continue to monitor his BP and follow up with his PMD for reevaluation/adjustment of medications if warranted. I have reviewed the relationship between hypertension as it relates to sleep-disordered breathing.     3. Recommended a dedicated weight loss program through appropriate diet and exercise regimen as significant weight reduction has been shown to reduce severity of obstructive sleep apnea. SUBJECTIVE/OBJECTIVE:    [place] Sleepiness Score: 13 and Modified F.O.S.Q. Score Total / 2: 15 which reflects improved sleep quality over therapy time. Sleep Review of Systems: notable for Negative difficulty falling asleep; Positive awakenings at night; Negative early morning headaches; Negative memory problems; Negative concentration issues; Negative chest pain; Negative shortness of breath; Negative significant joint pain at night; Negative significant muscle pain at night; Negative rashes or itching; Negative heartburn; Negative significant mood issues    Visit Vitals  BP (!) 159/99   Pulse 74   Ht 6' 1\" (1.854 m)   Wt 240 lb (108.9 kg)   SpO2 97%   BMI 31.66 kg/m²          General:   Alert, oriented, not in acute distress   Eyes:  Anicteric Sclerae; no obvious strabismus   Nose:  No obvious nasal septum deviation    Neck:   Midline trachea   Chest/Lungs:  Symmetrical lung expansion, clear lung fields on auscultation    CVS:  Normal rate, regular rhythm,  no JVD   Extremities:  No obvious rashes, no edema    Neuro:  No focal deficits; No obvious tremor    Psych:  Normal affect,  normal countenance     Patient's phone number [phone] (home)  was reviewed and confirmed for accuracy. He gives permission for messages regarding results and appointments to be left at that number. On this date 04/29/2022 I have spent 20 minutes reviewing previous notes, test results and face to face with the patient discussing the diagnosis and importance of compliance with the treatment plan as well as documenting on the day of the visit. An electronic signature was used to authenticate this note.     -- Jayme Rosenbaum NP, [facility]  04/29/22

## 2022-04-29 ENCOUNTER — OFFICE VISIT (OUTPATIENT)
Dept: SLEEP MEDICINE | Age: 52
End: 2022-04-29
Payer: MEDICAID

## 2022-04-29 VITALS
DIASTOLIC BLOOD PRESSURE: 99 MMHG | BODY MASS INDEX: 31.81 KG/M2 | OXYGEN SATURATION: 97 % | SYSTOLIC BLOOD PRESSURE: 159 MMHG | HEART RATE: 74 BPM | WEIGHT: 240 LBS | HEIGHT: 73 IN

## 2022-04-29 DIAGNOSIS — G47.33 OSA (OBSTRUCTIVE SLEEP APNEA): Primary | ICD-10-CM

## 2022-04-29 DIAGNOSIS — I10 PRIMARY HYPERTENSION: ICD-10-CM

## 2022-04-29 PROCEDURE — 99213 OFFICE O/P EST LOW 20 MIN: CPT | Performed by: NURSE PRACTITIONER

## 2022-05-03 ENCOUNTER — HOSPITAL ENCOUNTER (OUTPATIENT)
Dept: SLEEP MEDICINE | Age: 52
Discharge: HOME | End: 2022-05-03
Payer: MEDICAID

## 2022-05-03 PROCEDURE — 95806 SLEEP STUDY UNATT&RESP EFFT: CPT | Performed by: INTERNAL MEDICINE

## 2022-05-16 NOTE — PROGRESS NOTES
[address] [facility] [place]. [place], [address]   Tel.  [phone]   Fax. [address] [facility] [address]   [place], [address] [facility]   Tel.  [phone]   Fax. [phone] [address] [facility] [address]   Tel.  [phone]   Fax. [phone]ros[address] (: 1970) is a 54 y.o. male, established patient, seen for positive airway pressure follow-up and evaluation. He was last seen by me on 2022, previously seen by Dr. Denny Virk on 2020, prior notes reviewed in detail. Home sleep study 2022 showed an overall AHI of 9/hr without a lowest oxygen saturation of 87%. He is seen today for follow up. ASSESSMENT/PLAN:    ICD-10-CM ICD-9-CM    1. DAYAN (obstructive sleep apnea)  G47.33 327.23 REFERRAL TO DENTISTRY   2. Primary hypertension  I10 401.9    3. BMI 31.0-31.9,adult  Z68.31 V85.31      AHI = 9 (). 1. Sleep Apnea -  Sleep study results reviewed with patient. We have reviewed different treatment options. He would like to pursue oral appliance therapy. Referral placed to Dr. Amadeo Barrios. Patient to be seen once fit for OAT to repeat HSAT. Orders Placed This Encounter    REFERRAL TO DENTISTRY     Referral Priority:   Routine     Referral Type:   Consultation     Referral Reason:   Specialty Services Required     Referred to Provider:   Torres Tompkins DDS     Number of Visits Requested:   1     *  Counseling was provided regarding the importance of ensuring sufficient total sleep time, proper sleep hygiene, and safe driving. 2. Hypertension -  continue on his current regimen, he will continue to monitor his BP and follow up with his PMD for reevaluation/adjustment of medications if warranted. I have reviewed the relationship between hypertension as it relates to sleep-disordered breathing.     3. Recommended a dedicated weight loss program through appropriate diet and exercise regimen as significant weight reduction has been shown to reduce severity of obstructive sleep apnea. SUBJECTIVE/OBJECTIVE:    [place] Sleepiness Score: 11 and Modified F.O.S.Q. Score Total / 2: 14 which reflects improved sleep quality over therapy time. Sleep Review of Systems: notable for Negative difficulty falling asleep; Positive awakenings at night; Negative early morning headaches; Negative memory problems; Negative concentration issues; Negative chest pain; Negative shortness of breath; Negative significant joint pain at night; Negative significant muscle pain at night; Negative rashes or itching; Negative heartburn; Negative significant mood issues    Vitals reported by patient   Patient-Reported Vitals 5/17/2022   Patient-Reported Weight 234lb   Patient-Reported Pulse 76   Patient-Reported Temperature 99.4   Patient-Reported SpO2 99   Patient-Reported Systolic  244   Patient-Reported Diastolic 958      Calculated BMI 31    Physical Exam completed by visual and auditory observation of patient with verbal input from patient. General:   Alert, oriented, not in acute distress   Eyes:  Anicteric Sclerae; no obvious strabismus   Nose:  No obvious nasal septum deviation    Neck:   Midline trachea, no visible mass   Chest/Lungs:  Respiratory effort normal, no visualized signs of difficulty breathing or respiratory distress   CVS:  No JVD   Extremities:  No obvious rashes noted on face, neck, or hands   Neuro:  No facial asymmetry, no focal deficits; no obvious tremor    Psych:  Normal affect,  normal countenance     David Cervantes is being evaluated by a Virtual Visit (video visit) encounter to address concerns as mentioned above. A caregiver was present when appropriate. Due to this being a TeleHealth encounter (During [facility]-93 public health emergency), evaluation of the following organ systems was limited: Vitals/Constitutional/EENT/Resp/CV/GI//MS/Neuro/Skin/Heme-Lymph-Imm.   Pursuant to the emergency declaration under the [address] Emergencies Act, 1135 waiver authority and the Coronavirus Preparedness and Response Supplemental Appropriations Act, this Virtual Visit was conducted with patient's (and/or legal guardian's) consent, to reduce the patient's risk of exposure to COVID-19 and provide necessary medical care. The patient (or guardian if applicable) is aware that this is a billable service, which includes applicable copays. Patient identification was verified at the start of the visit: YES using name and date of birth. Patient's phone number [phone] (home)  was confirmed for accuracy. He gives permission for messages regarding results and appointments to be left at that number. Services were provided through a video synchronous discussion virtually to substitute for in-person clinic visit. I was in the office while conducting this encounter, patient located at their home or alternate location of their choice. On this date 05/17/2022 I have spent 20 minutes reviewing previous notes, test results and face to face with the patient discussing the diagnosis and importance of compliance with the treatment plan as well as documenting on the day of the visit. An electronic signature was used to authenticate this note.     -- An Azevedo NP, [facility]  05/17/22

## 2022-05-17 ENCOUNTER — DOCUMENTATION ONLY (OUTPATIENT)
Dept: SLEEP MEDICINE | Age: 52
End: 2022-05-17

## 2022-05-17 ENCOUNTER — VIRTUAL VISIT (OUTPATIENT)
Dept: SLEEP MEDICINE | Age: 52
End: 2022-05-17
Payer: MEDICAID

## 2022-05-17 DIAGNOSIS — I10 PRIMARY HYPERTENSION: ICD-10-CM

## 2022-05-17 DIAGNOSIS — G47.33 OSA (OBSTRUCTIVE SLEEP APNEA): Primary | ICD-10-CM

## 2022-05-17 PROCEDURE — 99213 OFFICE O/P EST LOW 20 MIN: CPT | Performed by: NURSE PRACTITIONER

## 2022-06-02 ENCOUNTER — OFFICE VISIT (OUTPATIENT)
Dept: CARDIOLOGY CLINIC | Age: 52
End: 2022-06-02
Payer: MEDICAID

## 2022-06-02 VITALS
DIASTOLIC BLOOD PRESSURE: 99 MMHG | OXYGEN SATURATION: 98 % | WEIGHT: 240 LBS | SYSTOLIC BLOOD PRESSURE: 155 MMHG | BODY MASS INDEX: 31.81 KG/M2 | HEIGHT: 73 IN | HEART RATE: 66 BPM

## 2022-06-02 DIAGNOSIS — I10 ESSENTIAL HYPERTENSION: Primary | ICD-10-CM

## 2022-06-02 DIAGNOSIS — N18.2 CKD (CHRONIC KIDNEY DISEASE) STAGE 2, GFR 60-89 ML/MIN: ICD-10-CM

## 2022-06-02 DIAGNOSIS — R06.83 SNORING: ICD-10-CM

## 2022-06-02 PROCEDURE — 99214 OFFICE O/P EST MOD 30 MIN: CPT | Performed by: INTERNAL MEDICINE

## 2022-06-02 RX ORDER — SPIRONOLACTONE 50 MG/1
50 TABLET, FILM COATED ORAL DAILY
Qty: 90 TABLET | Refills: 4 | Status: SHIPPED | OUTPATIENT
Start: 2022-06-02 | End: 2022-07-01 | Stop reason: SDUPTHER

## 2022-06-02 RX ORDER — LOSARTAN POTASSIUM 100 MG/1
100 TABLET ORAL DAILY
Qty: 90 TABLET | Refills: 4 | Status: SHIPPED | OUTPATIENT
Start: 2022-06-02 | End: 2022-07-01 | Stop reason: SDUPTHER

## 2022-06-02 NOTE — PROGRESS NOTES
All orders entered per verbal orders of Dr. Kellie Murray. MD [place]      Stop Lisinopril. Start Losartan 100mg p o daily. Increase Aldactone to 50mg po daily. See Dr. Marisela Carlos in 1 month. Refill per VO of Dr. No Ann:    Last appt: 4/22/2022    Future Appointments   Date Time Provider Kris Casi   6/16/2022  8:00 AM BRYN AGUILA BS AMB   7/1/2022 10:00 AM Kellie Talbot MD [facility] BS AMB       Requested Prescriptions     Pending Prescriptions Disp Refills    losartan (COZAAR) 100 mg tablet 90 Tablet 4     Sig: Take 1 Tablet by mouth daily.  spironolactone (ALDACTONE) 50 mg tablet 90 Tablet 4     Sig: Take 1 Tablet by mouth daily.

## 2022-06-02 NOTE — PROGRESS NOTES
Office Follow-up    NAME: Bala Galicia   :  1970  MRM:  [id number]    Date:  2022         Assessment and Plan:     1. Uncontrolled hypertension: BP better but still high. Has cough with Lisinopril. Will change to Losartan 100mg p o daily. Increase Aldactone to 50mg po daily. Continue amlodipine 10 mg p.o. daily. Continue to monitor salt intake. Monitor blood pressure at home. Check echocardiogram to compare previous noted LVH. GFR is 75 with Cr oif 1.29. K is 3.0. Monitor. Check home BP readings and bring log next time.      2. History of snoring. Mild DAYAN. Will be getting mouthpiece.      3. See Dr. Savanna Bethea in 1 month. He is self employed and works cleaning offices. ATTENTION:   This medical record was transcribed using an electronic medical records/speech recognition system. Although proofread, it may and can contain electronic, spelling and other errors. Corrections may be executed at a later time. Please feel free to contact us for any clarifications as needed. Subjective:     Bala Galicia, a 50y.o. year-old who presents for followup of HTN. No chest pain. No SOB.      Exam:     Physical Exam:  Visit Vitals  BP (!) 155/99 (BP 1 Location: Right upper arm, BP Patient Position: Sitting)   Pulse 66   Ht 6' 1\" (1.854 m)   Wt 240 lb (108.9 kg)   SpO2 98%   BMI 31.66 kg/m²     General appearance - alert, well appearing, and in no distress  Mental status - affect appropriate to mood  Eyes - sclera anicteric, moist mucous membranes  Neck - supple, no significant adenopathy  Chest - clear to auscultation, no wheezes, rales or rhonchi  Heart - normal rate, regular rhythm, normal S1, S2, no murmurs, rubs, clicks or gallops  Abdomen - soft, nontender, nondistended, no masses or organomegaly  Extremities - peripheral pulses normal, no pedal edema  Skin - normal coloration  no rashes    Medications:     Current Outpatient Medications   Medication Sig    spironolactone (ALDACTONE) 25 mg tablet Take 1 Tablet by mouth daily.  amLODIPine (NORVASC) 10 mg tablet Take 1 Tablet by mouth daily.  Blood Pressure Test Kit-Large (QUICK RESPONSE BP MONITOR) kit 1 Kit by Does Not Apply route daily.  fluticasone (FLONASE) 50 mcg/actuation nasal spray 2 puffs each nostril at bedtime (Patient taking differently: as needed. 2 puffs each nostril at bedtime)    lisinopriL (PRINIVIL, ZESTRIL) 20 mg tablet TAKE 1 TABLET BY MOUTH EVERY DAY (Patient not taking: Reported on 6/2/2022)     No current facility-administered medications for this visit. Diagnostic Data Review:       No specialty comments available. Lab Review:     Lab Results   Component Value Date/Time    Cholesterol, total 184 10/19/2021 01:10 AM    HDL Cholesterol 48 10/19/2021 01:10 AM    LDL, calculated 100.4 (H) 10/19/2021 01:10 AM    Triglyceride 178 (H) 10/19/2021 01:10 AM    CHOL/HDL Ratio 3.8 10/19/2021 01:10 AM     Lab Results   Component Value Date/Time    Creatinine 1.29 (H) 02/04/2022 12:00 AM     Lab Results   Component Value Date/Time    BUN 11 02/04/2022 12:00 AM     Lab Results   Component Value Date/Time    Potassium 3.0 (L) 02/04/2022 12:00 AM     Lab Results   Component Value Date/Time    Hemoglobin A1c 5.4 10/18/2021 07:39 PM     Lab Results   Component Value Date/Time    HGB 13.3 02/04/2022 12:00 AM     Lab Results   Component Value Date/Time    PLATELET 031 50/73/8444 12:00 AM     No results for input(s): CPK, CKMB, TROIQ in the last 72 hours. No lab exists for component: CKQMB, CPKMB             ___________________________________________________    Carey Day.  Char Woods MD, [facility]

## 2022-06-02 NOTE — PATIENT INSTRUCTIONS
Stop Lisinopril. Start Losartan 100mg p o daily. Increase Aldactone to 50mg po daily. See Dr. Marcelino Spurling in 1 month.

## 2022-06-02 NOTE — PROGRESS NOTES
Chief Complaint   Patient presents with    Hypertension    Cholesterol Problem    Other     PPositive triponin     Visit Vitals  BP (!) 155/99 (BP 1 Location: Right upper arm, BP Patient Position: Sitting)   Pulse 66   Ht 6' 1\" (1.854 m)   Wt 240 lb (108.9 kg)   SpO2 98%   BMI 31.66 kg/m²     Chest pain denied     Palpations denied    SOB denied    Dizziness denied    Swelling in hands/feet denied     Recent hospital stays denied     Pt reports his B/Ps run 160s/90s    Vitals:    06/02/22 0911 06/02/22 0916   BP: (!) 160/101 (!) 155/99   BP 1 Location: Left upper arm Right upper arm   BP Patient Position: Sitting Sitting   Pulse: 66    Height: 6' 1\" (1.854 m)    Weight: 240 lb (108.9 kg)    SpO2: 98%

## 2022-07-01 ENCOUNTER — OFFICE VISIT (OUTPATIENT)
Dept: CARDIOLOGY CLINIC | Age: 52
End: 2022-07-01
Payer: MEDICAID

## 2022-07-01 VITALS
OXYGEN SATURATION: 96 % | SYSTOLIC BLOOD PRESSURE: 170 MMHG | HEART RATE: 66 BPM | WEIGHT: 243.3 LBS | HEIGHT: 73 IN | BODY MASS INDEX: 32.25 KG/M2 | DIASTOLIC BLOOD PRESSURE: 98 MMHG | RESPIRATION RATE: 16 BRPM

## 2022-07-01 DIAGNOSIS — N18.2 CKD (CHRONIC KIDNEY DISEASE) STAGE 2, GFR 60-89 ML/MIN: ICD-10-CM

## 2022-07-01 DIAGNOSIS — I51.7 LVH (LEFT VENTRICULAR HYPERTROPHY): ICD-10-CM

## 2022-07-01 DIAGNOSIS — R06.83 SNORING: ICD-10-CM

## 2022-07-01 DIAGNOSIS — I10 ESSENTIAL HYPERTENSION: Primary | ICD-10-CM

## 2022-07-01 PROCEDURE — 99214 OFFICE O/P EST MOD 30 MIN: CPT | Performed by: INTERNAL MEDICINE

## 2022-07-01 RX ORDER — AMLODIPINE BESYLATE 10 MG/1
10 TABLET ORAL DAILY
Qty: 90 TABLET | Refills: 2 | Status: SHIPPED | OUTPATIENT
Start: 2022-07-01

## 2022-07-01 RX ORDER — LOSARTAN POTASSIUM 100 MG/1
100 TABLET ORAL DAILY
Qty: 90 TABLET | Refills: 2 | Status: SHIPPED | OUTPATIENT
Start: 2022-07-01

## 2022-07-01 RX ORDER — SPIRONOLACTONE 50 MG/1
50 TABLET, FILM COATED ORAL DAILY
Qty: 90 TABLET | Refills: 2 | Status: SHIPPED | OUTPATIENT
Start: 2022-07-01

## 2022-07-01 RX ORDER — CLONIDINE 0.1 MG/24H
1 PATCH, EXTENDED RELEASE TRANSDERMAL
Qty: 4 PATCH | Refills: 3 | Status: SHIPPED | OUTPATIENT
Start: 2022-07-01

## 2022-07-01 NOTE — PROGRESS NOTES
Office Follow-up    NAME: Min Best   :  1970  MRM:  [id number]    Date:  2022         Assessment and Plan:     1. Uncontrolled hypertension: Home blood pressure readings were reviewed. Home blood pressure readings continues to be elevated although slightly better from the past.  His home blood pressure readings on the diastolic side has been as high as 440 mmHg and systolic side has gone up to 190 mmHg. These are mostly before the medications and after medications his blood pressure does not go below 170. Continue losartan 100 mg p.o. daily, Aldactone 50 mg p.o. daily, amlodipine 10 mg p.o. daily. I will add clonidine 0.1 mg TTS patch every seventh day. Need to rule out secondary causes of hypertension given chronic hypokalemia also ruled out primary aldosteronism along with renal artery stenosis, pheochromocytoma or adrenal adenoma. Will refer to nephrology for further evaluation. To initiate some of the work-up I will get a noncontrast CT abdomen for adrenal adenoma. Check echocardiogram to compare previous noted LVH. GFR is 75 with Cr oif 1.29. K is 3.0. Monitor. Check home BP readings and bring log next time.      2. History of snoring. Mild DAYAN. Will be getting mouthpiece.      3. See  Tabitha Mendiola in 1 month. He is self employed and works cleaning offices. ATTENTION:   This medical record was transcribed using an electronic medical records/speech recognition system. Although proofread, it may and can contain electronic, spelling and other errors. Corrections may be executed at a later time. Please feel free to contact us for any clarifications as needed. Subjective:     Min Best, a 50y.o. year-old who presents for followup of HTN. No chest pain. No SOB.      Exam:     Physical Exam:  Visit Vitals  BP (!) 170/98 (BP 1 Location: Right upper arm, BP Patient Position: Sitting, BP Cuff Size: Large adult)   Pulse 66   Resp 16   Ht 6' 1\" (1.854 m)   Wt 243 lb 4.8 oz (110.4 kg)   SpO2 96%   BMI 32.10 kg/m²     General appearance - alert, well appearing, and in no distress  Mental status - affect appropriate to mood  Eyes - sclera anicteric, moist mucous membranes  Neck - supple, no significant adenopathy  Chest - clear to auscultation, no wheezes, rales or rhonchi  Heart - normal rate, regular rhythm, normal S1, S2, no murmurs, rubs, clicks or gallops  Abdomen - soft, nontender, nondistended, no masses or organomegaly  Extremities - peripheral pulses normal, no pedal edema  Skin - normal coloration  no rashes    Medications:     Current Outpatient Medications   Medication Sig    losartan (COZAAR) 100 mg tablet Take 1 Tablet by mouth daily.  spironolactone (ALDACTONE) 50 mg tablet Take 1 Tablet by mouth daily.  amLODIPine (NORVASC) 10 mg tablet Take 1 Tablet by mouth daily.  Blood Pressure Test Kit-Large (QUICK RESPONSE BP MONITOR) kit 1 Kit by Does Not Apply route daily.  fluticasone (FLONASE) 50 mcg/actuation nasal spray 2 puffs each nostril at bedtime (Patient taking differently: as needed. 2 puffs each nostril at bedtime)     No current facility-administered medications for this visit. Diagnostic Data Review:       No specialty comments available.       Lab Review:     Lab Results   Component Value Date/Time    Cholesterol, total 184 10/19/2021 01:10 AM    HDL Cholesterol 48 10/19/2021 01:10 AM    LDL, calculated 100.4 (H) 10/19/2021 01:10 AM    Triglyceride 178 (H) 10/19/2021 01:10 AM    CHOL/HDL Ratio 3.8 10/19/2021 01:10 AM     Lab Results   Component Value Date/Time    Creatinine 1.29 (H) 02/04/2022 12:00 AM     Lab Results   Component Value Date/Time    BUN 11 02/04/2022 12:00 AM     Lab Results   Component Value Date/Time    Potassium 3.0 (L) 02/04/2022 12:00 AM     Lab Results   Component Value Date/Time    Hemoglobin A1c 5.4 10/18/2021 07:39 PM     Lab Results   Component Value Date/Time    HGB 13.3 02/04/2022 12:00 AM     Lab Results   Component Value Date/Time    PLATELET 785 78/40/0824 12:00 AM     No results for input(s): CPK, CKMB, TROIQ in the last 72 hours. No lab exists for component: BEENA, LIUB             ___________________________________________________    Lizbeth Oviedo.  Catarina Daley MD, [facility]

## 2022-07-01 NOTE — PROGRESS NOTES
Room #: B4    Has not taken his BP medications this morning. States his BP has been 150's-160's after taking his meds at home. Will need refills on all of his medications. Chief Complaint   Patient presents with    Hypertension    Cholesterol Problem    Follow-up     1 month       Visit Vitals  BP (!) 170/98 (BP 1 Location: Right upper arm, BP Patient Position: Sitting, BP Cuff Size: Large adult)   Pulse 66   Resp 16   Ht 6' 1\" (1.854 m)   Wt 243 lb 4.8 oz (110.4 kg)   SpO2 96%   BMI 32.10 kg/m²         Chest pain:  NO  Shortness of breath:  NO  Edema: NO  Palpitations, skipped beats, rapid heartbeat:  NO  Dizziness:  NO    1. Have you been to the ER, urgent care clinic since your last visit? Hospitalized since your last visit? No    2. Have you seen or consulted any other health care providers outside of the [address] [place] [address] since your last visit? Include any pap smears or colon screening.  No      Refills:  YES

## 2022-07-01 NOTE — PROGRESS NOTES
Chief Complaint   Patient presents with    Hypertension    Cholesterol Problem    Follow-up     1 month       Vitals:    07/01/22 0955   Height: 6' 1\" (1.854 m)         Chest pain:     SOB:     Palpitations:     Dizziness:     Swelling:     Refills:       1. Have you been to the ER, urgent care clinic since your last visit? Hospitalized since your last visit? 2. Have you sen or consulted other health care providers outside of the [facility] since your last visit?  (Include any pap smears or colon screening.)

## 2022-07-01 NOTE — PATIENT INSTRUCTIONS
Refer to Dr. Chema Gramajo for evaluation of Secondary Hypertension. Check CT scan of the adrenal gland with and without contrast.     Start Clonidine 0.1mg/ 24 hr TTS patch every 7th day. Echo for HTN and LVH. See [facility] [place] in 1 month.

## 2022-07-07 ENCOUNTER — DOCUMENTATION ONLY (OUTPATIENT)
Dept: CARDIOLOGY CLINIC | Age: 52
End: 2022-07-07

## 2022-07-11 ENCOUNTER — DOCUMENTATION ONLY (OUTPATIENT)
Dept: CARDIOLOGY CLINIC | Age: 52
End: 2022-07-11

## 2022-07-12 ENCOUNTER — ANCILLARY PROCEDURE (OUTPATIENT)
Dept: CARDIOLOGY CLINIC | Age: 52
End: 2022-07-12
Payer: MEDICAID

## 2022-07-12 VITALS
HEIGHT: 73 IN | SYSTOLIC BLOOD PRESSURE: 136 MMHG | BODY MASS INDEX: 32.2 KG/M2 | DIASTOLIC BLOOD PRESSURE: 96 MMHG | WEIGHT: 243 LBS

## 2022-07-12 DIAGNOSIS — I51.7 LVH (LEFT VENTRICULAR HYPERTROPHY): ICD-10-CM

## 2022-07-12 DIAGNOSIS — N18.2 CKD (CHRONIC KIDNEY DISEASE) STAGE 2, GFR 60-89 ML/MIN: ICD-10-CM

## 2022-07-12 DIAGNOSIS — I10 ESSENTIAL HYPERTENSION: ICD-10-CM

## 2022-07-12 DIAGNOSIS — R06.83 SNORING: ICD-10-CM

## 2022-07-12 LAB
ECHO AO ASC DIAM: 3.8 CM
ECHO AO ASCENDING AORTA INDEX: 1.62 CM/M2
ECHO AO ROOT DIAM: 4.2 CM
ECHO AO ROOT INDEX: 1.79 CM/M2
ECHO AV AREA PEAK VELOCITY: 3.2 CM2
ECHO AV AREA VTI: 3 CM2
ECHO AV AREA/BSA PEAK VELOCITY: 1.4 CM2/M2
ECHO AV AREA/BSA VTI: 1.3 CM2/M2
ECHO AV MEAN GRADIENT: 4 MMHG
ECHO AV MEAN VELOCITY: 0.9 M/S
ECHO AV PEAK GRADIENT: 7 MMHG
ECHO AV PEAK VELOCITY: 1.4 M/S
ECHO AV VELOCITY RATIO: 0.79
ECHO AV VTI: 24.1 CM
ECHO EST RA PRESSURE: 3 MMHG
ECHO LA DIAMETER INDEX: 1.75 CM/M2
ECHO LA DIAMETER: 4.1 CM
ECHO LA TO AORTIC ROOT RATIO: 0.98
ECHO LA VOL 2C: 79 ML (ref 18–58)
ECHO LA VOL 4C: 82 ML (ref 18–58)
ECHO LA VOL BP: 83 ML (ref 18–58)
ECHO LA VOL/BSA BIPLANE: 35 ML/M2 (ref 16–34)
ECHO LA VOLUME AREA LENGTH: 82 ML
ECHO LA VOLUME INDEX A2C: 34 ML/M2 (ref 16–34)
ECHO LA VOLUME INDEX A4C: 35 ML/M2 (ref 16–34)
ECHO LA VOLUME INDEX AREA LENGTH: 35 ML/M2 (ref 16–34)
ECHO LV E' LATERAL VELOCITY: 5 CM/S
ECHO LV E' SEPTAL VELOCITY: 4 CM/S
ECHO LV EDV A2C: 108 ML
ECHO LV EDV A4C: 113 ML
ECHO LV EDV BP: 115 ML (ref 67–155)
ECHO LV EDV INDEX A4C: 48 ML/M2
ECHO LV EDV INDEX BP: 49 ML/M2
ECHO LV EDV NDEX A2C: 46 ML/M2
ECHO LV EJECTION FRACTION A2C: 55 %
ECHO LV EJECTION FRACTION A4C: 55 %
ECHO LV EJECTION FRACTION BIPLANE: 56 % (ref 55–100)
ECHO LV ESV A2C: 49 ML
ECHO LV ESV A4C: 51 ML
ECHO LV ESV BP: 50 ML (ref 22–58)
ECHO LV ESV INDEX A2C: 21 ML/M2
ECHO LV ESV INDEX A4C: 22 ML/M2
ECHO LV ESV INDEX BP: 21 ML/M2
ECHO LV FRACTIONAL SHORTENING: 31 % (ref 28–44)
ECHO LV GLOBAL LONGITUDINAL STRAIN (GLS): -11.4 %
ECHO LV INTERNAL DIMENSION DIASTOLE INDEX: 2.31 CM/M2
ECHO LV INTERNAL DIMENSION DIASTOLIC: 5.4 CM (ref 4.2–5.9)
ECHO LV INTERNAL DIMENSION SYSTOLIC INDEX: 1.58 CM/M2
ECHO LV INTERNAL DIMENSION SYSTOLIC: 3.7 CM
ECHO LV IVSD: 1.5 CM (ref 0.6–1)
ECHO LV MASS 2D: 362.7 G (ref 88–224)
ECHO LV MASS INDEX 2D: 155 G/M2 (ref 49–115)
ECHO LV POSTERIOR WALL DIASTOLIC: 1.5 CM (ref 0.6–1)
ECHO LV RELATIVE WALL THICKNESS RATIO: 0.56
ECHO LVOT AREA: 4.2 CM2
ECHO LVOT AV VTI INDEX: 0.76
ECHO LVOT DIAM: 2.3 CM
ECHO LVOT MEAN GRADIENT: 2 MMHG
ECHO LVOT PEAK GRADIENT: 5 MMHG
ECHO LVOT PEAK VELOCITY: 1.1 M/S
ECHO LVOT STROKE VOLUME INDEX: 32.3 ML/M2
ECHO LVOT SV: 75.6 ML
ECHO LVOT VTI: 18.2 CM
ECHO MV A VELOCITY: 0.76 M/S
ECHO MV AREA PHT: 3.8 CM2
ECHO MV E DECELERATION TIME (DT): 197.4 MS
ECHO MV E VELOCITY: 0.63 M/S
ECHO MV E/A RATIO: 0.83
ECHO MV E/E' LATERAL: 12.6
ECHO MV E/E' RATIO (AVERAGED): 14.18
ECHO MV E/E' SEPTAL: 15.75
ECHO MV PRESSURE HALF TIME (PHT): 57.3 MS
ECHO RV FREE WALL PEAK S': 10 CM/S
ECHO RV INTERNAL DIMENSION: 3.5 CM
ECHO RV TAPSE: 1.9 CM (ref 1.7–?)

## 2022-07-12 PROCEDURE — 93306 TTE W/DOPPLER COMPLETE: CPT | Performed by: INTERNAL MEDICINE

## 2022-07-12 PROCEDURE — 93356 MYOCRD STRAIN IMG SPCKL TRCK: CPT | Performed by: INTERNAL MEDICINE

## 2022-07-22 ENCOUNTER — HOSPITAL ENCOUNTER (OUTPATIENT)
Dept: CT IMAGING | Age: 52
Discharge: HOME | End: 2022-07-22
Attending: INTERNAL MEDICINE
Payer: MEDICAID

## 2022-07-22 DIAGNOSIS — I10 ESSENTIAL HYPERTENSION: ICD-10-CM

## 2022-07-22 DIAGNOSIS — R06.83 SNORING: ICD-10-CM

## 2022-07-22 DIAGNOSIS — I51.7 LVH (LEFT VENTRICULAR HYPERTROPHY): ICD-10-CM

## 2022-07-22 DIAGNOSIS — N18.2 CKD (CHRONIC KIDNEY DISEASE) STAGE 2, GFR 60-89 ML/MIN: ICD-10-CM

## 2022-07-22 PROCEDURE — 74150 CT ABDOMEN W/O CONTRAST: CPT

## 2022-07-28 ENCOUNTER — TELEPHONE (OUTPATIENT)
Dept: CARDIOLOGY CLINIC | Age: 52
End: 2022-07-28

## 2022-09-08 DIAGNOSIS — I10 ESSENTIAL HYPERTENSION: ICD-10-CM

## 2022-09-08 RX ORDER — LOSARTAN POTASSIUM 100 MG/1
100 TABLET ORAL DAILY
Qty: 90 TABLET | Refills: 2 | OUTPATIENT
Start: 2022-09-08

## 2022-09-08 RX ORDER — AMLODIPINE BESYLATE 10 MG/1
10 TABLET ORAL DAILY
Qty: 90 TABLET | Refills: 2 | OUTPATIENT
Start: 2022-09-08

## 2022-09-08 RX ORDER — SPIRONOLACTONE 50 MG/1
50 TABLET, FILM COATED ORAL DAILY
Qty: 90 TABLET | Refills: 2 | OUTPATIENT
Start: 2022-09-08

## 2022-09-08 NOTE — TELEPHONE ENCOUNTER
Refill per VO of Dr. Ron Myers:    Last appt: 8/5/2022    Future Appointments   Date Time Provider Kris Lance   10/27/2022  8:40 AM Chetan Talbot MD [facility] BS AMB       Requested Prescriptions     Pending Prescriptions Disp Refills    losartan (COZAAR) 100 mg tablet 90 Tablet 2     Sig: Take 1 Tablet by mouth daily.          Refill was denied because,  REFILLS STILL AVAILABLE AT THE PHARMACY

## 2022-09-08 NOTE — TELEPHONE ENCOUNTER
Patient is calling because he needs a refill on his amlodipine 10 mg, Losartan 100 mg, and spironolactone 50 mg. Pharmacy is confirmed. Patient is out of his medication.     [phone]

## 2022-12-27 ENCOUNTER — NURSE TRIAGE (OUTPATIENT)
Dept: OTHER | Facility: CLINIC | Age: 52
End: 2022-12-27

## 2022-12-27 ENCOUNTER — OFFICE VISIT (OUTPATIENT)
Dept: FAMILY MEDICINE CLINIC | Age: 52
End: 2022-12-27
Payer: MEDICAID

## 2022-12-27 VITALS
HEIGHT: 73 IN | RESPIRATION RATE: 14 BRPM | BODY MASS INDEX: 32.76 KG/M2 | OXYGEN SATURATION: 99 % | DIASTOLIC BLOOD PRESSURE: 126 MMHG | HEART RATE: 67 BPM | SYSTOLIC BLOOD PRESSURE: 181 MMHG | WEIGHT: 247.2 LBS | TEMPERATURE: 98 F

## 2022-12-27 DIAGNOSIS — M77.02 GOLFERS ELBOW OF LEFT UPPER EXTREMITY: Primary | ICD-10-CM

## 2022-12-27 DIAGNOSIS — I10 ESSENTIAL HYPERTENSION: ICD-10-CM

## 2022-12-27 PROCEDURE — 3078F DIAST BP <80 MM HG: CPT | Performed by: FAMILY MEDICINE

## 2022-12-27 PROCEDURE — 3074F SYST BP LT 130 MM HG: CPT | Performed by: FAMILY MEDICINE

## 2022-12-27 PROCEDURE — 99214 OFFICE O/P EST MOD 30 MIN: CPT | Performed by: FAMILY MEDICINE

## 2022-12-27 RX ORDER — CLONIDINE 0.1 MG/24H
1 PATCH, EXTENDED RELEASE TRANSDERMAL
Qty: 4 PATCH | Refills: 3 | Status: SHIPPED | OUTPATIENT
Start: 2022-12-27

## 2022-12-27 RX ORDER — LOSARTAN POTASSIUM 50 MG/1
50 TABLET ORAL 2 TIMES DAILY
Qty: 180 TABLET | Refills: 2 | Status: SHIPPED | OUTPATIENT
Start: 2022-12-27 | End: 2023-09-23

## 2022-12-27 NOTE — PROGRESS NOTES
[address] [place] [address]   Office [phone], Fax [phone]    Subjective:     No chief complaint on file. HPI:  Ya Donato is a 50 y.o. male with a history of HTN that presents for:    Left Arm Pain and Weakness:   Feeling weaker for the last month and a half. No inciting even. No truama, no gym, no sports. Also notes a mild pain on the medial side. Pain comes when lifting. Pain is a 6/10, described as a soreness. Works with his hands, owns a cleaning service. No hx of surgeries or prior trauma to the arm . HTN:   History of uncontrolled BP, has been seen by Cardiology and Nephrology. 6 months ago when he was last seen it was recommended that he start clonidine, however patient never picked up this medicine and was lost to follow-up for a time due to a trauma in the family. Current medications include losartan 100 mg daily and spironolactone 50 mg daily. He has taken amlodipine in the past, but took himself off due to side effects of \"depressed mood \". Denies drug use. Occasionally drinks EtOh. Takes his home blood pressure occasionally, notes pressures usually 160s over 100s. Hx of Left Ventricular Hypertrophy, follows cardiologist, last seen 6 months. Health Maintenance:  Health Maintenance Due   Topic Date Due    Hepatitis C Screening  Never done    Colorectal Cancer Screening Combo  Never done    Shingles Vaccine (1 of 2) Never done    COVID-19 Vaccine (2 - Pfizer series) 06/18/2022    Flu Vaccine (1) Never done    A1C test (Diabetic or Prediabetic)  10/18/2022          Past Medical Hx  I personally reviewed. Past Medical History:   Diagnosis Date    Hypertension         SocHx   I personally reviewed.   Social History     Socioeconomic History    Marital status: SINGLE     Spouse name: Not on file    Number of children: Not on file    Years of education: Not on file    Highest education level: Not on file   Occupational History    Not on file Tobacco Use    Smoking status: Never    Smokeless tobacco: Never   Substance and Sexual Activity    Alcohol use: Yes    Drug use: No    Sexual activity: Yes     Partners: Female     Birth control/protection: Condom   Other Topics Concern     Service Not Asked    Blood Transfusions Not Asked    Caffeine Concern Not Asked    Occupational Exposure Not Asked    Hobby Hazards Not Asked    Sleep Concern Not Asked    Stress Concern Not Asked    Weight Concern Not Asked    Special Diet Not Asked    Back Care Not Asked    Exercise Not Asked    Bike Helmet Not Asked    Seat Belt Not Asked    Self-Exams Not Asked   Social History Narrative    Not on file     Social Determinants of Health     Financial Resource Strain: Not on file   Food Insecurity: Not on file   Transportation Needs: Not on file   Physical Activity: Not on file   Stress: Not on file   Social Connections: Not on file   Intimate Partner Violence: Not on file   Housing Stability: Not on file        Allergies  I personally reviewed. No Known Allergies     Medications  I personally reviewed. Current Outpatient Medications on File Prior to Visit   Medication Sig Dispense Refill    spironolactone (ALDACTONE) 50 mg tablet Take 1 Tablet by mouth daily. 90 Tablet 2    Blood Pressure Test Kit-Large (QUICK RESPONSE BP MONITOR) kit 1 Kit by Does Not Apply route daily. 1 Kit 0    fluticasone (FLONASE) 50 mcg/actuation nasal spray 2 puffs each nostril at bedtime (Patient taking differently: as needed. 2 puffs each nostril at bedtime) 1 Bottle 3     No current facility-administered medications on file prior to visit. ROS:   Review of Systems   Eyes:  Negative for blurred vision. Respiratory:  Negative for cough and shortness of breath. Cardiovascular:  Negative for chest pain, palpitations and leg swelling. Gastrointestinal:  Negative for abdominal pain. Musculoskeletal:  Positive for myalgias. Negative for back pain, falls and neck pain. Neurological:  Positive for tingling and focal weakness. Negative for dizziness, sensory change, speech change, seizures and headaches. Objective:   Vitals  I personally reviewed. Visit Vitals  BP (!) 181/126   Pulse 67   Temp 98 °F (36.7 °C)   Resp 14   Ht 6' 1\" (1.854 m)   Wt 247 lb 3.2 oz (112.1 kg)   SpO2 99%   BMI 32.61 kg/m²        Physical Exam:   GEN: Well appearing. No apparent distress. Responds to all questions appropriately. Lungs: No labored respirations. Talking in complete sentences without difficulty. Elbow: Left  Deformity: None  Effusion: None    ROM:  Flexion: 150 degrees  Extension: 0 degrees  Supination: 75 degrees  Pronation: 75 degrees  Ulna deviation: normal  Radial deviation: normal    Palpation:   Medial Epicondyle: Tenderness  Lateral Epicondyle: No tenderness  Olecranon: No tenderness  Radial head: No tenderness    Strength:  Flexion:   Left: 5/5 Right: 5/5  Extension:  Left: 5/5 Right: 5/5     Special Tests:  Resisted wrist flexion: Negative  Resisted wrist extension: Positive  Resisted Long Finger Extension: Negative  Cubital Tunnel Tinels: Negative      Neuro/Vascular:  Pulses intact, no edema, and neurologically intact    Skin: No obvious rash or skin breakdown. Notable Labs and Imaging:   - none    Assessment/Plan:   Assessment:   16-year-old male with history of uncontrolled hypertension presents today for left forearm pain and weakness, with history and exam consistent with golHuayue Digitals elbow. Blood pressure also noted to be elevated. Patient started on Clonidine as recommended by cardiology. 1. 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 Left medial forearm pain and weakness, left elbow pain and weakness, exam consistent with  left medial tendonopathy.   -Provided information, encouraged stretching and exercises. NSAIDs for exacerbations, however careful given uncontrolled HTN.  Referred for PT.   - PT Referral placed     2. Essential hypertension  Consistently elevated pressures in clinic as well as at home, approximately 180s over 110s as average. Work up so far has been normal.   Encouraged follow up with Cardiology and Nephrology   Start Catapres 0.1mg/24hrs, every 7 days, as discussed at previous cardiology visit. Losartan 100mg daily, switch to 50mg BID for better control. Patient unable to take Amlodipine, caused depressed mood. Follow up early next week (unable to return this week). Will need BMP at that time. Pt was discussed with Dr Eduard Becker (attending physician). I have reviewed patient medical and social history and medications. I have reviewed pertinent labs results and other data. I have discussed the diagnosis with the patient and the intended plan as seen in the above orders. The patient has received an after-visit summary and questions were answered concerning future plans. I have discussed medication side effects and warnings with the patient as well.     Curtis Alejandre MD  Resident [place] [address]

## 2022-12-27 NOTE — TELEPHONE ENCOUNTER
Received call from Estefanía Mari at [facility] with Red Flag Complaint. Subjective: Caller states \"I am having left arm pain. \"     Current Symptoms: Left arm pain with weakness    Onset: a few months ago; worsening    Associated Symptoms: weakness- intermittent, patient reports elevated BP- 168/100- unsure if it due to pain with arm. Pain Severity: 6/10; aching; constant    Temperature: Denies    What has been tried: Rest, patient takes BP medication everyday- has not missed any doses. Recommended disposition: See in Office Today or Tomorrow    Care advice provided, patient verbalizes understanding; denies any other questions or concerns; instructed to call back for any new or worsening symptoms. Patient/Caller agrees with recommended disposition; writer provided warm transfer to Paola Cha at [facility] for appointment scheduling    Attention Provider: Thank you for allowing me to participate in the care of your patient. The patient was connected to triage in response to information provided to the [facility]. Please do not respond through this encounter as the response is not directed to a shared pool.     Reason for Disposition   Patient wants to be seen    Protocols used: Arm Injury-ADULT-[place]

## 2023-01-01 NOTE — PATIENT INSTRUCTIONS
Call office of Cardiologist Dr. Jonathon Mckeon at [phone] to set up an appointment.      Call office of Dr. Levander Boas to schedule appointment for sleep study at [phone]
Statement Selected

## 2023-01-03 ENCOUNTER — OFFICE VISIT (OUTPATIENT)
Dept: FAMILY MEDICINE CLINIC | Age: 53
End: 2023-01-03
Payer: MEDICAID

## 2023-01-03 VITALS — RESPIRATION RATE: 17 BRPM | HEIGHT: 73 IN | WEIGHT: 244 LBS | OXYGEN SATURATION: 98 % | BODY MASS INDEX: 32.34 KG/M2

## 2023-01-03 DIAGNOSIS — I10 ESSENTIAL HYPERTENSION: Primary | ICD-10-CM

## 2023-01-03 NOTE — PROGRESS NOTES
Chief Complaint   Patient presents with    Hypertension     1. Have you been to the ER, urgent care clinic since your last visit? Hospitalized since your last visit? No    2. Have you seen or consulted any other health care providers outside of the [address] [place] [address] since your last visit? Include any pap smears or colon screening.  No

## 2023-01-03 NOTE — PROGRESS NOTES
[address] [facility] [address] [place] [address]   Office [phone], Fax [phone]    Subjective:     Chief Complaint   Patient presents with    Hypertension       HPI:  Anel Abraham is a 50 y.o. male with a history of resistant HTN that presents for: HTN Follow Up    Hypertension:  Uncontrolled, hx of LVH, follows with Cardiology and Nephrology. At visit last week noted 180s/110s. Instructed to start on Clonidine 0.1mg/24hrs q7days at that time. In addition to home Losartan 50mg BID and Spironolactone 50mg daily. Clonidine was originally recommended by cardiology over the summer however patient never started due to family emergency. Patient states that he has not yet taken his clonidine and plans to start tomorrow. He elected not to take it due to a family  over the weekend. He states that he did take his losartan and spironolactone today. He states that he has been checking his pressures occasionally and they usually run 160s over 100s at home for the last week. Health Maintenance:  Health Maintenance Due   Topic Date Due    Hepatitis C Screening  Never done    Colorectal Cancer Screening Combo  Never done    Shingles Vaccine (1 of 2) Never done    COVID-19 Vaccine (2 - Pfizer series) 2022    Flu Vaccine (1) Never done    A1C test (Diabetic or Prediabetic)  10/18/2022          Past Medical Hx  I personally reviewed. Past Medical History:   Diagnosis Date    Hypertension         SocHx   I personally reviewed.   Social History     Socioeconomic History    Marital status: SINGLE     Spouse name: Not on file    Number of children: Not on file    Years of education: Not on file    Highest education level: Not on file   Occupational History    Not on file   Tobacco Use    Smoking status: Never    Smokeless tobacco: Never   Substance and Sexual Activity    Alcohol use: Yes    Drug use: No    Sexual activity: Yes     Partners: Female     Birth control/protection: Condom   Other Topics Concern     Service Not Asked    Blood Transfusions Not Asked    Caffeine Concern Not Asked    Occupational Exposure Not Asked    Hobby Hazards Not Asked    Sleep Concern Not Asked    Stress Concern Not Asked    Weight Concern Not Asked    Special Diet Not Asked    Back Care Not Asked    Exercise Not Asked    Bike Helmet Not Asked    Seat Belt Not Asked    Self-Exams Not Asked   Social History Narrative    Not on file     Social Determinants of Health     Financial Resource Strain: Not on file   Food Insecurity: Not on file   Transportation Needs: Not on file   Physical Activity: Not on file   Stress: Not on file   Social Connections: Not on file   Intimate Partner Violence: Not on file   Housing Stability: Not on file        Allergies  I personally reviewed. No Known Allergies     Medications  I personally reviewed. Current Outpatient Medications on File Prior to Visit   Medication Sig Dispense Refill    cloNIDine (CATAPRES) 0.1 mg/24 hr ptwk 1 Patch by TransDERmal route every seven (7) days. 4 Patch 3    losartan (COZAAR) 50 mg tablet Take 1 Tablet by mouth two (2) times a day for 270 days. 180 Tablet 2    spironolactone (ALDACTONE) 50 mg tablet Take 1 Tablet by mouth daily. 90 Tablet 2    Blood Pressure Test Kit-Large (QUICK RESPONSE BP MONITOR) kit 1 Kit by Does Not Apply route daily. 1 Kit 0    fluticasone (FLONASE) 50 mcg/actuation nasal spray 2 puffs each nostril at bedtime (Patient taking differently: as needed. 2 puffs each nostril at bedtime) 1 Bottle 3     No current facility-administered medications on file prior to visit. ROS:   Review of Systems   Eyes:  Negative for blurred vision. Respiratory:  Negative for cough and shortness of breath. Cardiovascular:  Negative for chest pain, palpitations and leg swelling. Gastrointestinal:  Negative for abdominal pain. Neurological:  Negative for dizziness and headaches. Objective:   Vitals  I personally reviewed.   Visit Vitals  BP Mary Aleman ) (P) 165/104   Pulse (P) 69   Temp (P) 97.3 °F (36.3 °C) (Temporal)   Resp 17   Ht 6' 1\" (1.854 m)   Wt 244 lb (110.7 kg)   SpO2 98%   BMI 32.19 kg/m²        Physical Exam:   Physical Exam  Constitutional:       Appearance: Normal appearance. Cardiovascular:      Rate and Rhythm: Normal rate and regular rhythm. Pulses: Normal pulses. Heart sounds: Normal heart sounds. Pulmonary:      Effort: Pulmonary effort is normal. No respiratory distress. Breath sounds: Normal breath sounds. No wheezing. Neurological:      General: No focal deficit present. Mental Status: He is alert and oriented to person, place, and time. Notable Labs and Imaging:   - None    Assessment/Plan:   Assessment:   63-year-old male with history of resistant hypertension and LVH followed by cardiology. Patient has tried multiple medications in the past, was last seen by cardiology in July, and was recommended to add clonidine patch to his regimen of losartan 100 mg and spironolactone 50 mg. Patient following up today after instructions to start clonidine patch last week, however he still has not started the patch due to a  over the weekend. Home pressures remain 160s over 100s. Will check labs today and patient to start clonidine patch. Patient to keep home log and follow-up in a week. 1. Essential hypertension  Home pressures remain 160s /100s on losartan 50 mg twice daily and spironolactone 50 mg daily. Patient has not been able to start clonidine patch at this time. Instructions to start clonidine patch today and follow-up next week with pressure log and cuff. Patient also working to schedule follow-up with cardiology. Of note, patient unable to take Amlodipine due to side effects but patient does not remember trying hydrochlorothiazide in the past.  Uncertain why his current regimen does not include this, no evidence in prior cardiology note.   May consider HCTZ in the future if pressures remain uncontrolled. - METABOLIC PANEL, COMPREHENSIVE; Future  - CBC W/O DIFF; Future  - MICROALBUMIN, UR, RAND W/ MICROALB/CREAT RATIO; Future  - MICROALBUMIN, UR, RAND W/ MICROALB/CREAT RATIO  - CBC W/O DIFF  - METABOLIC PANEL, COMPREHENSIVE    Follow-up and Dispositions    Return in about 1 week (around 1/10/2023) for Follow Up HTN. Pt was discussed with Dr Jac Monet (attending physician). I have reviewed patient medical and social history and medications. I have reviewed pertinent labs results and other data. I have discussed the diagnosis with the patient and the intended plan as seen in the above orders. The patient has received an after-visit summary and questions were answered concerning future plans. I have discussed medication side effects and warnings with the patient as well.     Felix Reyes MD  Resident [place] [address]

## 2023-01-04 LAB
ALBUMIN SERPL-MCNC: 5 G/DL (ref 4–5)
ALBUMIN/CREAT UR: 16 MG/G CREAT (ref 0–29)
ALBUMIN/GLOB SERPL: 2.4 {RATIO} (ref 1.2–2.2)
ALP SERPL-CCNC: 71 IU/L (ref 44–121)
ALT SERPL-CCNC: 23 IU/L (ref 0–44)
AST SERPL-CCNC: 26 IU/L (ref 0–40)
BILIRUB SERPL-MCNC: 0.3 MG/DL (ref 0–1.2)
BUN SERPL-MCNC: 15 MG/DL (ref 6–24)
BUN/CREAT SERPL: 12 (ref 9–20)
CALCIUM SERPL-MCNC: 9.5 MG/DL (ref 8.7–10.2)
CHLORIDE SERPL-SCNC: 101 MMOL/L (ref 96–106)
CO2 SERPL-SCNC: 25 MMOL/L (ref 20–29)
CREAT SERPL-MCNC: 1.22 MG/DL (ref 0.76–1.27)
CREAT UR-MCNC: 206.2 MG/DL
EGFR: 72 ML/MIN/1.73
ERYTHROCYTE [DISTWIDTH] IN BLOOD BY AUTOMATED COUNT: 13.5 % (ref 11.6–15.4)
GLOBULIN SER CALC-MCNC: 2.1 G/DL (ref 1.5–4.5)
GLUCOSE SERPL-MCNC: 108 MG/DL (ref 70–99)
HCT VFR BLD AUTO: 39.2 % (ref 37.5–51)
HGB BLD-MCNC: 13.4 G/DL (ref 13–17.7)
MCH RBC QN AUTO: 27.1 PG (ref 26.6–33)
MCHC RBC AUTO-ENTMCNC: 34.2 G/DL (ref 31.5–35.7)
MCV RBC AUTO: 79 FL (ref 79–97)
MICROALBUMIN UR-MCNC: 32.5 UG/ML
PLATELET # BLD AUTO: 261 X10E3/UL (ref 150–450)
POTASSIUM SERPL-SCNC: 3.9 MMOL/L (ref 3.5–5.2)
PROT SERPL-MCNC: 7.1 G/DL (ref 6–8.5)
RBC # BLD AUTO: 4.95 X10E6/UL (ref 4.14–5.8)
SODIUM SERPL-SCNC: 141 MMOL/L (ref 134–144)
WBC # BLD AUTO: 2.8 X10E3/UL (ref 3.4–10.8)

## 2023-01-18 ENCOUNTER — OFFICE VISIT (OUTPATIENT)
Dept: FAMILY MEDICINE CLINIC | Age: 53
End: 2023-01-18
Payer: MEDICAID

## 2023-01-18 VITALS
HEIGHT: 73 IN | DIASTOLIC BLOOD PRESSURE: 105 MMHG | HEART RATE: 68 BPM | BODY MASS INDEX: 32.07 KG/M2 | TEMPERATURE: 97.5 F | SYSTOLIC BLOOD PRESSURE: 158 MMHG | WEIGHT: 242 LBS | OXYGEN SATURATION: 99 % | RESPIRATION RATE: 17 BRPM

## 2023-01-18 DIAGNOSIS — I10 ESSENTIAL HYPERTENSION: Primary | ICD-10-CM

## 2023-01-18 RX ORDER — HYDROCHLOROTHIAZIDE 12.5 MG/1
12.5 TABLET ORAL DAILY
Qty: 90 TABLET | Refills: 2 | Status: SHIPPED | OUTPATIENT
Start: 2023-01-18

## 2023-01-18 NOTE — PROGRESS NOTES
[facility] [address] [place] [address]   Office [phone], Fax [phone]    Subjective:     Chief Complaint   Patient presents with    Hypertension       HPI:  Shelley Limon is a 50 y.o. male with a history of uncontrolled hypertension that presents for: Hypertension follow-up    Hypertension:   Long history of uncontrolled HTN and LVH, followed by cardiology in the past. Home regimen of Losartan 50mg BID and Spironolactone 50mg daily. Unable to take Amlodipine due to side effects. Started on Clonidine patch at last visit two weeks ago as pressures were still 160s/100s. Labs reviewed from last visit. Kidneys appear stable, Cr at baseline 1.2. Urine Microalb wnl. Home Log - 140s-150s/ 90s. Missed Losartan 50mg last night     Pressures remain elevated, asymptomatic. Health Maintenance:  Health Maintenance Due   Topic Date Due    Hepatitis C Screening  Never done    Colorectal Cancer Screening Combo  Never done    Shingles Vaccine (1 of 2) Never done    COVID-19 Vaccine (2 - Pfizer series) 06/18/2022    Flu Vaccine (1) Never done    A1C test (Diabetic or Prediabetic)  10/18/2022          Past Medical Hx  I personally reviewed. Past Medical History:   Diagnosis Date    Hypertension         SocHx   I personally reviewed.   Social History     Socioeconomic History    Marital status: SINGLE     Spouse name: Not on file    Number of children: Not on file    Years of education: Not on file    Highest education level: Not on file   Occupational History    Not on file   Tobacco Use    Smoking status: Never    Smokeless tobacco: Never   Substance and Sexual Activity    Alcohol use: Yes    Drug use: No    Sexual activity: Yes     Partners: Female     Birth control/protection: Condom   Other Topics Concern     Service Not Asked    Blood Transfusions Not Asked    Caffeine Concern Not Asked    Occupational Exposure Not Asked    Hobby Hazards Not Asked    Sleep Concern Not Asked Stress Concern Not Asked    Weight Concern Not Asked    Special Diet Not Asked    Back Care Not Asked    Exercise Not Asked    Bike Helmet Not Asked    Seat Belt Not Asked    Self-Exams Not Asked   Social History Narrative    Not on file     Social Determinants of Health     Financial Resource Strain: Not on file   Food Insecurity: Not on file   Transportation Needs: Not on file   Physical Activity: Not on file   Stress: Not on file   Social Connections: Not on file   Intimate Partner Violence: Not on file   Housing Stability: Not on file        Allergies  I personally reviewed. No Known Allergies     Medications  I personally reviewed. Current Outpatient Medications on File Prior to Visit   Medication Sig Dispense Refill    cloNIDine (CATAPRES) 0.1 mg/24 hr ptwk 1 Patch by TransDERmal route every seven (7) days. 4 Patch 3    losartan (COZAAR) 50 mg tablet Take 1 Tablet by mouth two (2) times a day for 270 days. 180 Tablet 2    spironolactone (ALDACTONE) 50 mg tablet Take 1 Tablet by mouth daily. 90 Tablet 2    Blood Pressure Test Kit-Large (QUICK RESPONSE BP MONITOR) kit 1 Kit by Does Not Apply route daily. 1 Kit 0    fluticasone (FLONASE) 50 mcg/actuation nasal spray 2 puffs each nostril at bedtime (Patient taking differently: as needed. 2 puffs each nostril at bedtime) 1 Bottle 3     No current facility-administered medications on file prior to visit. ROS:   Review of Systems   Eyes:  Negative for blurred vision. Respiratory:  Negative for cough and shortness of breath. Cardiovascular:  Negative for chest pain, palpitations and leg swelling. Gastrointestinal:  Negative for abdominal pain. Neurological:  Negative for dizziness and headaches. Objective:   Vitals  I personally reviewed.   Visit Vitals  BP (!) 158/105   Pulse 68   Temp 97.5 °F (36.4 °C) (Temporal)   Resp 17   Ht 6' 1\" (1.854 m)   Wt 242 lb (109.8 kg)   SpO2 99%   BMI 31.93 kg/m²        Physical Exam:    Physical Exam  Constitutional:       Appearance: Normal appearance. Cardiovascular:      Rate and Rhythm: Normal rate and regular rhythm. Pulses: Normal pulses. Heart sounds: Normal heart sounds. Pulmonary:      Effort: Pulmonary effort is normal. No respiratory distress. Breath sounds: Normal breath sounds. No wheezing. Neurological:      General: No focal deficit present. Mental Status: He is alert and oriented to person, place, and time. Notable Labs and Imaging:   None     Assessment/Plan:   Assessment:   51year-old male with history of uncontrolled hypertension presents today as follow-up after starting clonidine in addition to losartan and spironolactone. Pressures remain uncontrolled, will start HCTZ. 1. Essential hypertension  History of uncontrolled hypertension, with current medications of losartan 50 mg twice daily and spironolactone 50 mg daily. Patient recently started clonidine patch 0.1 mg per 24 hours. On home pressure log review, pressures have improved from average systolics in the 614Q to average systolics in the 769Y. However pressures still remain above goal both at home and in the office today. Patient is unable to take amlodipine due to history of side effects. We will start patient on hydrochlorothiazide 12.5 mg daily and follow-up in 2 weeks (uncertain as to why patient has not been on this medication in the past, no clear evidence in the chart and patient does not know). - hydroCHLOROthiazide (HYDRODIURIL) 12.5 mg tablet; Take 1 Tablet by mouth daily. Dispense: 90 Tablet; Refill: 2    Follow-up and Dispositions    Return in about 2 weeks (around 2/1/2023) for HTN, after starting HCTZ . Pt was discussed with Dr Nia Jefferson (attending physician). I have reviewed patient medical and social history and medications. I have reviewed pertinent labs results and other data.  I have discussed the diagnosis with the patient and the intended plan as seen in the above orders. The patient has received an after-visit summary and questions were answered concerning future plans. I have discussed medication side effects and warnings with the patient as well.     Malini Bond MD  Resident [place] [address]

## 2023-01-18 NOTE — PROGRESS NOTES
Chief Complaint   Patient presents with    Hypertension     1. Have you been to the ER, urgent care clinic since your last visit? Hospitalized since your last visit? No    2. Have you seen or consulted any other health care providers outside of the [address] [place] [address] since your last visit? Include any pap smears or colon screening.  No

## 2023-01-18 NOTE — PROGRESS NOTES
I reviewed with the resident the medical history and the resident's findings on the physical examination. I discussed with the resident the patient's diagnosis and concur with the plan. Hypertension follow up: not at goal. Adding HCTZ. Follow up in 2 weeks with BP cuff.

## 2023-01-23 ENCOUNTER — HOSPITAL ENCOUNTER (EMERGENCY)
Age: 53
Discharge: HOME | End: 2023-01-23
Attending: EMERGENCY MEDICINE
Payer: MEDICAID

## 2023-01-23 ENCOUNTER — APPOINTMENT (OUTPATIENT)
Dept: VASCULAR SURGERY | Age: 53
End: 2023-01-23
Attending: STUDENT IN AN ORGANIZED HEALTH CARE EDUCATION/TRAINING PROGRAM
Payer: MEDICAID

## 2023-01-23 VITALS
WEIGHT: 240 LBS | OXYGEN SATURATION: 98 % | SYSTOLIC BLOOD PRESSURE: 180 MMHG | RESPIRATION RATE: 16 BRPM | TEMPERATURE: 98 F | HEART RATE: 71 BPM | BODY MASS INDEX: 31.81 KG/M2 | DIASTOLIC BLOOD PRESSURE: 97 MMHG | HEIGHT: 73 IN

## 2023-01-23 DIAGNOSIS — M79.662 PAIN IN LEFT LOWER LEG: Primary | ICD-10-CM

## 2023-01-23 PROCEDURE — 93971 EXTREMITY STUDY: CPT

## 2023-01-23 PROCEDURE — 74011250637 HC RX REV CODE- 250/637: Performed by: STUDENT IN AN ORGANIZED HEALTH CARE EDUCATION/TRAINING PROGRAM

## 2023-01-23 PROCEDURE — 99284 EMERGENCY DEPT VISIT MOD MDM: CPT

## 2023-01-23 RX ORDER — NAPROXEN 500 MG/1
500 TABLET ORAL 2 TIMES DAILY WITH MEALS
Qty: 20 TABLET | Refills: 0 | Status: SHIPPED | OUTPATIENT
Start: 2023-01-23 | End: 2023-02-02

## 2023-01-23 RX ORDER — HYDROCODONE BITARTRATE AND ACETAMINOPHEN 7.5; 325 MG/1; MG/1
1 TABLET ORAL ONCE
Status: COMPLETED | OUTPATIENT
Start: 2023-01-23 | End: 2023-01-23

## 2023-01-23 RX ADMIN — HYDROCODONE BITARTRATE AND ACETAMINOPHEN 1 TABLET: 7.5; 325 TABLET ORAL at 22:11

## 2023-01-23 NOTE — Clinical Note
[address]  [facility] EMERGENCY DEPT  [id number]  [phone]    Work/School Note    Date: 1/23/2023    To Whom It May concern:    Boo Davila was seen and treated today in the emergency room by the following provider(s):  Attending Provider: Siva Colbert MD  Physician Assistant: Asad Jansen, [address]. Boo Davila is excused from work/school on 1/23/2023 through 1/25/2023. He is medically clear to return to work/school on 1/26/2023.          Sincerely,          SHAGUFTA Vaughan

## 2023-01-24 NOTE — ED TRIAGE NOTES
Pt reports left lower leg pain. Reports a knot to back of leg with pain. Denies injury to leg. Denies chest pain and SOB.

## 2023-01-24 NOTE — ED PROVIDER NOTES
63-year-old male with history of HTN presents to ED with left leg pain and swelling. Patient reports that today he was stepping off a ladder when he felt a sharp pain in the back of his left leg. He notes that since then it has felt achy in nature and there is some swelling and warmth to his lower extremity as well. Otherwise denied any known injury, recent surgery or travel. Denies any history of blood clot. Denies any chest pain, shortness of breath, numbness, tingling, fevers or chills. The history is provided by the patient.    Leg Pain        Past Medical History:   Diagnosis Date    Hypertension        Past Surgical History:   Procedure Laterality Date    HX APPENDECTOMY      HX HEENT      tonsillectomy         Family History:   Problem Relation Age of Onset    Hypertension Mother     Diabetes Mother     Hypertension Father        Social History     Socioeconomic History    Marital status: SINGLE     Spouse name: Not on file    Number of children: Not on file    Years of education: Not on file    Highest education level: Not on file   Occupational History    Not on file   Tobacco Use    Smoking status: Never    Smokeless tobacco: Never   Substance and Sexual Activity    Alcohol use: Yes    Drug use: No    Sexual activity: Yes     Partners: Female     Birth control/protection: Condom   Other Topics Concern     Service Not Asked    Blood Transfusions Not Asked    Caffeine Concern Not Asked    Occupational Exposure Not Asked    Hobby Hazards Not Asked    Sleep Concern Not Asked    Stress Concern Not Asked    Weight Concern Not Asked    Special Diet Not Asked    Back Care Not Asked    Exercise Not Asked    Bike Helmet Not Asked    Seat Belt Not Asked    Self-Exams Not Asked   Social History Narrative    Not on file     Social Determinants of Health     Financial Resource Strain: Not on file   Food Insecurity: Not on file   Transportation Needs: Not on file   Physical Activity: Not on file   Stress: Not on file   Social Connections: Not on file   Intimate Partner Violence: Not on file   Housing Stability: Not on file         ALLERGIES: Patient has no known allergies. Review of Systems   Constitutional:  Negative for fever. HENT:  Negative for congestion and sinus pain. Respiratory:  Negative for cough. Cardiovascular:  Positive for leg swelling. Negative for chest pain. Gastrointestinal:  Negative for nausea and vomiting. Genitourinary:  Negative for dysuria. Musculoskeletal:  Negative for myalgias. Neurological:  Negative for headaches. Hematological:  Negative for adenopathy. All other systems reviewed and are negative. Vitals:    01/23/23 2129   BP: (!) 180/97   Pulse: 71   Resp: 16   Temp: 98 °F (36.7 °C)   SpO2: 98%   Weight: 108.9 kg (240 lb)   Height: 6' 1\" (1.854 m)            Physical Exam  Vitals and nursing note reviewed. Constitutional:       Appearance: Normal appearance. Eyes:      Extraocular Movements: Extraocular movements intact. Pupils: Pupils are equal, round, and reactive to light. Cardiovascular:      Rate and Rhythm: Normal rate and regular rhythm. Heart sounds: Normal heart sounds. Pulmonary:      Effort: Pulmonary effort is normal.      Breath sounds: Normal breath sounds. Abdominal:      Palpations: Abdomen is soft. Tenderness: There is no abdominal tenderness. Musculoskeletal:      Left knee: No swelling, deformity or bony tenderness. No tenderness. Right lower leg: Normal.      Left lower leg: Swelling and tenderness (posterior calf) present. 1+ Edema present. Left ankle: No swelling. No tenderness. Normal range of motion. Comments: Noted erythema and warmth to LLE   Lymphadenopathy:      Cervical: No cervical adenopathy. Skin:     General: Skin is warm and dry. Neurological:      General: No focal deficit present. Mental Status: He is alert and oriented to person, place, and time.    Psychiatric: Mood and Affect: Mood normal.         Behavior: Behavior normal.        Medical Decision Making  62-year-old male with history of HTN presents to ED with left leg pain and swelling. Vital signs revealed patient afebrile in triage. Physical exam notable for swelling and tenderness to left posterior calf with erythema and warmth to palpation but no left knee or left ankle swelling, deformity, reduced range of motion or bony tenderness. Imaging included duplex of left lower extremity which showed no acute DVT. I considered ordering x-rays but given no bony tenderness, deformity or reduced range of motion, this is not indicated at this time. No significant socioeconomic barriers to care identified by patient during this visit. Had discussions with supervising physician regarding the care of this patient. Patient received [place] while in ED with improvement of symptoms. Discussed findings and reasoning with patient and family members who verbalized understanding. Patient will be discharged with prescription for naproxen and instructions for follow-up with Ortho. Patient will also be discharged with instructions for conservative care, follow up and return precautions. This has all been discussed at length with patient and family members. Amount and/or Complexity of Data Reviewed  ECG/medicine tests: ordered. Risk  Prescription drug management.            Procedures

## 2023-02-20 ENCOUNTER — OFFICE VISIT (OUTPATIENT)
Dept: FAMILY MEDICINE CLINIC | Age: 53
End: 2023-02-20

## 2023-02-20 VITALS
OXYGEN SATURATION: 97 % | BODY MASS INDEX: 32.46 KG/M2 | HEART RATE: 66 BPM | SYSTOLIC BLOOD PRESSURE: 177 MMHG | DIASTOLIC BLOOD PRESSURE: 115 MMHG | TEMPERATURE: 98 F | WEIGHT: 246 LBS

## 2023-02-20 DIAGNOSIS — I10 ESSENTIAL HYPERTENSION: ICD-10-CM

## 2023-02-20 RX ORDER — HYDROCHLOROTHIAZIDE 25 MG/1
25 TABLET ORAL DAILY
Qty: 90 TABLET | Refills: 2 | Status: SHIPPED | OUTPATIENT
Start: 2023-02-20

## 2023-02-20 RX ORDER — CLONIDINE 0.1 MG/24H
1 PATCH, EXTENDED RELEASE TRANSDERMAL
Qty: 4 PATCH | Refills: 3 | Status: SHIPPED | OUTPATIENT
Start: 2023-02-20

## 2023-02-20 NOTE — PROGRESS NOTES
[address] [address] [place] [address]   Office [phone], Fax [phone]    Subjective:     Chief Complaint   Patient presents with    Hypertension     Follow up       HPI:  Melissa Quiroz is a 50 y.o. male with a history of resistant hypertension that presents for: Hypertension follow-up    Hypertension:   Resistant hypertension, has been seen by both nephrology and cardiology in the past.  Noted to have LVH. Has tried multiple medications. Currently taking losartan 50 mg twice daily, clonidine patch, and recently started on hydrochlorothiazide 12.5 mg daily. Patient is unable to take calcium channel blockers due to side effects previously. Hydrochlorothiazide 12.5 mg daily started at last visit. Patient presents today without log, however reports home average systolics 704E to 767P, and diastolics in the 25G. He does present with his home cuff today which appears to match clinic measurements. Of note, patient has been out of clonidine for the last 3 days and reports elevated pressures today in clinic due to this. Patient remains asymptomatic, no chest pain, no headaches, no dizziness, no palpitations. Patient has had negative renal artery ultrasound and lab evaluation for secondary hypertension with cardiology in the past.      Health Maintenance:  Health Maintenance Due   Topic Date Due    Hepatitis C Screening  Never done    Colorectal Cancer Screening Combo  Never done    Shingles Vaccine (1 of 2) Never done    COVID-19 Vaccine (2 - Pfizer series) 06/18/2022    Flu Vaccine (1) Never done    A1C test (Diabetic or Prediabetic)  10/18/2022          Past Medical Hx  I personally reviewed. Past Medical History:   Diagnosis Date    Hypertension         SocHx   I personally reviewed.   Social History     Socioeconomic History    Marital status: SINGLE     Spouse name: Not on file    Number of children: Not on file    Years of education: Not on file    Highest education level: Not on file Occupational History    Not on file   Tobacco Use    Smoking status: Never    Smokeless tobacco: Never   Substance and Sexual Activity    Alcohol use: Yes    Drug use: No    Sexual activity: Yes     Partners: Female     Birth control/protection: Condom   Other Topics Concern     Service Not Asked    Blood Transfusions Not Asked    Caffeine Concern Not Asked    Occupational Exposure Not Asked    Hobby Hazards Not Asked    Sleep Concern Not Asked    Stress Concern Not Asked    Weight Concern Not Asked    Special Diet Not Asked    Back Care Not Asked    Exercise Not Asked    Bike Helmet Not Asked    Seat Belt Not Asked    Self-Exams Not Asked   Social History Narrative    Not on file     Social Determinants of Health     Financial Resource Strain: Not on file   Food Insecurity: Not on file   Transportation Needs: Not on file   Physical Activity: Not on file   Stress: Not on file   Social Connections: Not on file   Intimate Partner Violence: Not on file   Housing Stability: Not on file        Allergies  I personally reviewed. No Known Allergies     Medications  I personally reviewed. Current Outpatient Medications on File Prior to Visit   Medication Sig Dispense Refill    losartan (COZAAR) 50 mg tablet Take 1 Tablet by mouth two (2) times a day for 270 days. 180 Tablet 2    Blood Pressure Test Kit-Large (QUICK RESPONSE BP MONITOR) kit 1 Kit by Does Not Apply route daily. 1 Kit 0    fluticasone (FLONASE) 50 mcg/actuation nasal spray 2 puffs each nostril at bedtime (Patient taking differently: as needed. 2 puffs each nostril at bedtime) 1 Bottle 3     No current facility-administered medications on file prior to visit. ROS:    Review of Systems   Eyes:  Negative for blurred vision. Respiratory:  Negative for cough and shortness of breath. Cardiovascular:  Negative for chest pain, palpitations and leg swelling. Gastrointestinal:  Negative for abdominal pain.    Neurological:  Negative for dizziness and headaches. Objective:   Vitals  I personally reviewed. Visit Vitals  BP (!) 177/115 Comment: using home bp machine   Pulse 66   Temp 98 °F (36.7 °C) (Oral)   Wt 246 lb (111.6 kg)   SpO2 97%   BMI 32.46 kg/m²        Physical Exam:    Physical Exam  Constitutional:       Appearance: Normal appearance. Cardiovascular:      Rate and Rhythm: Normal rate and regular rhythm. Pulses: Normal pulses. Heart sounds: Normal heart sounds. Pulmonary:      Effort: Pulmonary effort is normal. No respiratory distress. Breath sounds: Normal breath sounds. No wheezing. Neurological:      General: No focal deficit present. Mental Status: He is alert and oriented to person, place, and time. Notable Labs and Imaging:   None     Assessment/Plan:   Assessment:   56-year-old male with resistant hypertension presents today as follow-up for elevated blood pressure after recently starting hydrochlorothiazide 12.5 mg daily. Elevated pressures in clinic likely secondary to patient's being out of clonidine for the last 3 days, however per home report pressures were consistently above goal (160/90) at home even when he was on his clonidine. Will increase hydrochlorothiazide to 25 mg daily. 1. Essential hypertension  Uncontrolled. Refill clonidine patch. Continue losartan 50 mg twice daily. Increase hydrochlorothiazide to 25 mg daily, follow-up in 2 weeks with home log. Recheck BMP at next visit. If pressures remain uncontrolled, may consider spironolactone versus hydralazine for further control. Has seen nephrology and cardiology in the past, continues to work on scheduling follow-up appointment with cardiology. - cloNIDine (CATAPRES) 0.1 mg/24 hr ptwk; 1 Patch by TransDERmal route every seven (7) days. Dispense: 4 Patch; Refill: 3  - hydroCHLOROthiazide (HYDRODIURIL) 25 mg tablet; Take 1 Tablet by mouth daily. Dispense: 90 Tablet;  Refill: 2    Follow-up and Dispositions    Return in about 2 weeks (around 3/6/2023) for HTN Follow Up. Pt was discussed with Dr Gillian Luther (attending physician). I have reviewed patient medical and social history and medications. I have reviewed pertinent labs results and other data. I have discussed the diagnosis with the patient and the intended plan as seen in the above orders. The patient has received an after-visit summary and questions were answered concerning future plans. I have discussed medication side effects and warnings with the patient as well.     Rebeca Garner MD  Resident [place] [address]

## 2023-02-20 NOTE — PROGRESS NOTES
Vitals:    02/20/23 0928 02/20/23 0932   BP: (!) 172/99 (!) 177/115   Pulse: 65 66   Temp: 98 °F (36.7 °C)    TempSrc: Oral    SpO2: 97%    Weight: 246 lb (111.6 kg)

## 2023-03-23 ENCOUNTER — TELEPHONE (OUTPATIENT)
Dept: FAMILY MEDICINE CLINIC | Age: 53
End: 2023-03-23

## 2023-03-23 NOTE — TELEPHONE ENCOUNTER
Sent Doxy. me link twice and called patient twice to attempt to reach him for his virtual visit this morning. He did not answer. I left a voicemail stating I would have someone reach out to him to reschedule him if he would still like to be seen.

## 2024-01-31 ENCOUNTER — APPOINTMENT (OUTPATIENT)
Facility: HOSPITAL | Age: 54
End: 2024-01-31
Payer: MEDICAID

## 2024-01-31 ENCOUNTER — HOSPITAL ENCOUNTER (INPATIENT)
Facility: HOSPITAL | Age: 54
LOS: 2 days | Discharge: HOME | End: 2024-02-02
Attending: EMERGENCY MEDICINE | Admitting: FAMILY MEDICINE
Payer: MEDICAID

## 2024-01-31 DIAGNOSIS — R79.89 ELEVATED TROPONIN: ICD-10-CM

## 2024-01-31 DIAGNOSIS — I21.4 NSTEMI (NON-ST ELEVATED MYOCARDIAL INFARCTION) (HCC): ICD-10-CM

## 2024-01-31 DIAGNOSIS — E87.6 HYPOKALEMIA: ICD-10-CM

## 2024-01-31 DIAGNOSIS — I16.0 HYPERTENSIVE URGENCY: Primary | ICD-10-CM

## 2024-01-31 DIAGNOSIS — I11.9 HYPERTENSIVE HEART DISEASE WITHOUT HEART FAILURE: ICD-10-CM

## 2024-01-31 LAB
ALBUMIN SERPL-MCNC: 3.6 G/DL (ref 3.5–5)
ALBUMIN/GLOB SERPL: 1 (ref 1.1–2.2)
ALP SERPL-CCNC: 65 U/L (ref 45–117)
ALT SERPL-CCNC: 36 U/L (ref 12–78)
AMORPH CRY URNS QL MICRO: ABNORMAL
ANION GAP SERPL CALC-SCNC: 6 MMOL/L (ref 5–15)
APPEARANCE UR: ABNORMAL
AST SERPL-CCNC: 30 U/L (ref 15–37)
BACTERIA URNS QL MICRO: NEGATIVE /HPF
BASOPHILS # BLD: 0 K/UL (ref 0–0.1)
BASOPHILS NFR BLD: 0 % (ref 0–1)
BILIRUB SERPL-MCNC: 0.5 MG/DL (ref 0.2–1)
BILIRUB UR QL: NEGATIVE
BUN SERPL-MCNC: 13 MG/DL (ref 6–20)
BUN/CREAT SERPL: 10 (ref 12–20)
CALCIUM SERPL-MCNC: 8.5 MG/DL (ref 8.5–10.1)
CHLORIDE SERPL-SCNC: 106 MMOL/L (ref 97–108)
CK SERPL-CCNC: 1197 U/L (ref 39–308)
CO2 SERPL-SCNC: 32 MMOL/L (ref 21–32)
COLOR UR: ABNORMAL
COMMENT:: NORMAL
CREAT SERPL-MCNC: 1.31 MG/DL (ref 0.7–1.3)
DIFFERENTIAL METHOD BLD: ABNORMAL
EOSINOPHIL # BLD: 0.1 K/UL (ref 0–0.4)
EOSINOPHIL NFR BLD: 4 % (ref 0–7)
EPITH CASTS URNS QL MICRO: ABNORMAL /LPF
ERYTHROCYTE [DISTWIDTH] IN BLOOD BY AUTOMATED COUNT: 12.8 % (ref 11.5–14.5)
GLOBULIN SER CALC-MCNC: 3.5 G/DL (ref 2–4)
GLUCOSE SERPL-MCNC: 89 MG/DL (ref 65–100)
GLUCOSE UR STRIP.AUTO-MCNC: NEGATIVE MG/DL
HCT VFR BLD AUTO: 34.5 % (ref 36.6–50.3)
HGB BLD-MCNC: 12.6 G/DL (ref 12.1–17)
HGB UR QL STRIP: NEGATIVE
IMM GRANULOCYTES # BLD AUTO: 0 K/UL
IMM GRANULOCYTES NFR BLD AUTO: 0 %
KETONES UR QL STRIP.AUTO: NEGATIVE MG/DL
LEUKOCYTE ESTERASE UR QL STRIP.AUTO: NEGATIVE
LYMPHOCYTES # BLD: 1.6 K/UL (ref 0.8–3.5)
LYMPHOCYTES NFR BLD: 54 % (ref 12–49)
MAGNESIUM SERPL-MCNC: 1.8 MG/DL (ref 1.6–2.4)
MCH RBC QN AUTO: 27.9 PG (ref 26–34)
MCHC RBC AUTO-ENTMCNC: 36.5 G/DL (ref 30–36.5)
MCV RBC AUTO: 76.5 FL (ref 80–99)
MONOCYTES # BLD: 0.2 K/UL (ref 0–1)
MONOCYTES NFR BLD: 6 % (ref 5–13)
NEUTS SEG # BLD: 1.1 K/UL (ref 1.8–8)
NEUTS SEG NFR BLD: 36 % (ref 32–75)
NITRITE UR QL STRIP.AUTO: NEGATIVE
NRBC # BLD: 0 K/UL (ref 0–0.01)
NRBC BLD-RTO: 0 PER 100 WBC
NT PRO BNP: 267 PG/ML (ref 0–125)
PH UR STRIP: 7.5 (ref 5–8)
PHOSPHATE SERPL-MCNC: 3.4 MG/DL (ref 2.6–4.7)
PLATELET # BLD AUTO: 239 K/UL (ref 150–400)
PMV BLD AUTO: 9.8 FL (ref 8.9–12.9)
POTASSIUM SERPL-SCNC: 2.5 MMOL/L (ref 3.5–5.1)
PROT SERPL-MCNC: 7.1 G/DL (ref 6.4–8.2)
PROT UR STRIP-MCNC: NEGATIVE MG/DL
RBC # BLD AUTO: 4.51 M/UL (ref 4.1–5.7)
RBC #/AREA URNS HPF: ABNORMAL /HPF (ref 0–5)
RBC MORPH BLD: ABNORMAL
SODIUM SERPL-SCNC: 144 MMOL/L (ref 136–145)
SP GR UR REFRACTOMETRY: 1.01 (ref 1–1.03)
SPECIMEN HOLD: NORMAL
TROPONIN I SERPL HS-MCNC: 1641 NG/L (ref 0–76)
TSH SERPL DL<=0.05 MIU/L-ACNC: 2.22 UIU/ML (ref 0.36–3.74)
URINE CULTURE IF INDICATED: ABNORMAL
UROBILINOGEN UR QL STRIP.AUTO: 0.2 EU/DL (ref 0.2–1)
WBC # BLD AUTO: 3 K/UL (ref 4.1–11.1)
WBC URNS QL MICRO: ABNORMAL /HPF (ref 0–4)

## 2024-01-31 PROCEDURE — 36415 COLL VENOUS BLD VENIPUNCTURE: CPT

## 2024-01-31 PROCEDURE — 80053 COMPREHEN METABOLIC PANEL: CPT

## 2024-01-31 PROCEDURE — 84443 ASSAY THYROID STIM HORMONE: CPT

## 2024-01-31 PROCEDURE — 81001 URINALYSIS AUTO W/SCOPE: CPT

## 2024-01-31 PROCEDURE — 83880 ASSAY OF NATRIURETIC PEPTIDE: CPT

## 2024-01-31 PROCEDURE — 99285 EMERGENCY DEPT VISIT HI MDM: CPT

## 2024-01-31 PROCEDURE — 6370000000 HC RX 637 (ALT 250 FOR IP): Performed by: EMERGENCY MEDICINE

## 2024-01-31 PROCEDURE — 96365 THER/PROPH/DIAG IV INF INIT: CPT

## 2024-01-31 PROCEDURE — 6360000002 HC RX W HCPCS: Performed by: EMERGENCY MEDICINE

## 2024-01-31 PROCEDURE — 85025 COMPLETE CBC W/AUTO DIFF WBC: CPT

## 2024-01-31 PROCEDURE — 71275 CT ANGIOGRAPHY CHEST: CPT

## 2024-01-31 PROCEDURE — 6360000004 HC RX CONTRAST MEDICATION: Performed by: EMERGENCY MEDICINE

## 2024-01-31 PROCEDURE — 83735 ASSAY OF MAGNESIUM: CPT

## 2024-01-31 PROCEDURE — 82550 ASSAY OF CK (CPK): CPT

## 2024-01-31 PROCEDURE — 96375 TX/PRO/DX INJ NEW DRUG ADDON: CPT

## 2024-01-31 PROCEDURE — 93005 ELECTROCARDIOGRAM TRACING: CPT | Performed by: EMERGENCY MEDICINE

## 2024-01-31 PROCEDURE — 71045 X-RAY EXAM CHEST 1 VIEW: CPT

## 2024-01-31 PROCEDURE — 84100 ASSAY OF PHOSPHORUS: CPT

## 2024-01-31 PROCEDURE — 1100000000 HC RM PRIVATE

## 2024-01-31 PROCEDURE — 84484 ASSAY OF TROPONIN QUANT: CPT

## 2024-01-31 RX ORDER — AMLODIPINE BESYLATE 10 MG/1
10 TABLET ORAL DAILY
COMMUNITY
Start: 2023-10-08

## 2024-01-31 RX ORDER — HYDRALAZINE HYDROCHLORIDE 20 MG/ML
20 INJECTION INTRAMUSCULAR; INTRAVENOUS ONCE
Status: COMPLETED | OUTPATIENT
Start: 2024-01-31 | End: 2024-01-31

## 2024-01-31 RX ORDER — HYDRALAZINE HYDROCHLORIDE 50 MG/1
50 TABLET, FILM COATED ORAL EVERY 8 HOURS
Status: ON HOLD | COMMUNITY
Start: 2023-10-08 | End: 2024-02-02 | Stop reason: HOSPADM

## 2024-01-31 RX ORDER — ASPIRIN 325 MG
325 TABLET ORAL
Status: COMPLETED | OUTPATIENT
Start: 2024-01-31 | End: 2024-01-31

## 2024-01-31 RX ORDER — AMLODIPINE BESYLATE 5 MG/1
5 TABLET ORAL
Status: COMPLETED | OUTPATIENT
Start: 2024-01-31 | End: 2024-01-31

## 2024-01-31 RX ORDER — HYDROCHLOROTHIAZIDE 25 MG/1
25 TABLET ORAL DAILY
Status: DISCONTINUED | OUTPATIENT
Start: 2024-02-01 | End: 2024-01-31

## 2024-01-31 RX ORDER — ASPIRIN 81 MG/1
81 TABLET ORAL DAILY
COMMUNITY
Start: 2023-10-08

## 2024-01-31 RX ORDER — POTASSIUM CHLORIDE 7.45 MG/ML
10 INJECTION INTRAVENOUS ONCE
Status: COMPLETED | OUTPATIENT
Start: 2024-01-31 | End: 2024-01-31

## 2024-01-31 RX ORDER — ATORVASTATIN CALCIUM 40 MG/1
40 TABLET, FILM COATED ORAL DAILY
COMMUNITY
Start: 2023-10-08

## 2024-01-31 RX ORDER — HYDROCHLOROTHIAZIDE 25 MG/1
25 TABLET ORAL DAILY
Status: DISCONTINUED | OUTPATIENT
Start: 2024-01-31 | End: 2024-02-02

## 2024-01-31 RX ORDER — LOSARTAN POTASSIUM 50 MG/1
100 TABLET ORAL DAILY
COMMUNITY

## 2024-01-31 RX ADMIN — POTASSIUM BICARBONATE 40 MEQ: 782 TABLET, EFFERVESCENT ORAL at 22:15

## 2024-01-31 RX ADMIN — HYDROCHLOROTHIAZIDE 25 MG: 25 TABLET ORAL at 22:15

## 2024-01-31 RX ADMIN — IOPAMIDOL 100 ML: 755 INJECTION, SOLUTION INTRAVENOUS at 22:47

## 2024-01-31 RX ADMIN — HYDRALAZINE HYDROCHLORIDE 20 MG: 20 INJECTION INTRAMUSCULAR; INTRAVENOUS at 22:30

## 2024-01-31 RX ADMIN — AMLODIPINE BESYLATE 5 MG: 5 TABLET ORAL at 22:07

## 2024-01-31 RX ADMIN — POTASSIUM CHLORIDE 10 MEQ: 7.46 INJECTION, SOLUTION INTRAVENOUS at 22:19

## 2024-01-31 RX ADMIN — ASPIRIN 325 MG: 325 TABLET ORAL at 23:20

## 2024-01-31 ASSESSMENT — PAIN - FUNCTIONAL ASSESSMENT: PAIN_FUNCTIONAL_ASSESSMENT: NONE - DENIES PAIN

## 2024-02-01 ENCOUNTER — APPOINTMENT (OUTPATIENT)
Facility: HOSPITAL | Age: 54
End: 2024-02-01
Payer: MEDICAID

## 2024-02-01 PROBLEM — R20.2 PARESTHESIA OF LEFT UPPER AND LOWER EXTREMITY: Status: RESOLVED | Noted: 2021-10-18 | Resolved: 2024-02-01

## 2024-02-01 PROBLEM — I10 ESSENTIAL HYPERTENSION: Status: RESOLVED | Noted: 2018-12-09 | Resolved: 2024-02-01

## 2024-02-01 PROBLEM — N18.2 CKD (CHRONIC KIDNEY DISEASE) STAGE 2, GFR 60-89 ML/MIN: Status: RESOLVED | Noted: 2021-10-19 | Resolved: 2024-02-01

## 2024-02-01 PROBLEM — I16.0 HYPERTENSIVE URGENCY: Status: ACTIVE | Noted: 2024-02-01

## 2024-02-01 PROBLEM — R73.09 ELEVATED GLUCOSE: Status: RESOLVED | Noted: 2018-12-09 | Resolved: 2024-02-01

## 2024-02-01 PROBLEM — I16.1 HYPERTENSIVE EMERGENCY: Status: ACTIVE | Noted: 2024-02-01

## 2024-02-01 LAB
ANION GAP SERPL CALC-SCNC: 6 MMOL/L (ref 5–15)
BUN SERPL-MCNC: 11 MG/DL (ref 6–20)
BUN SERPL-MCNC: 12 MG/DL (ref 6–20)
BUN SERPL-MCNC: 14 MG/DL (ref 6–20)
BUN/CREAT SERPL: 10 (ref 12–20)
BUN/CREAT SERPL: 10 (ref 12–20)
BUN/CREAT SERPL: 8 (ref 12–20)
CALCIUM SERPL-MCNC: 8.6 MG/DL (ref 8.5–10.1)
CALCIUM SERPL-MCNC: 8.9 MG/DL (ref 8.5–10.1)
CALCIUM SERPL-MCNC: 9.4 MG/DL (ref 8.5–10.1)
CHLORIDE SERPL-SCNC: 102 MMOL/L (ref 97–108)
CHLORIDE SERPL-SCNC: 103 MMOL/L (ref 97–108)
CHLORIDE SERPL-SCNC: 104 MMOL/L (ref 97–108)
CHOLEST SERPL-MCNC: 172 MG/DL
CK SERPL-CCNC: 999 U/L (ref 39–308)
CO2 SERPL-SCNC: 32 MMOL/L (ref 21–32)
CO2 SERPL-SCNC: 33 MMOL/L (ref 21–32)
CO2 SERPL-SCNC: 34 MMOL/L (ref 21–32)
CREAT SERPL-MCNC: 1.16 MG/DL (ref 0.7–1.3)
CREAT SERPL-MCNC: 1.32 MG/DL (ref 0.7–1.3)
CREAT SERPL-MCNC: 1.45 MG/DL (ref 0.7–1.3)
ECHO AO ARCH DIAM: 2.5 CM
ECHO AO ASC DIAM: 4 CM
ECHO AO ASCENDING AORTA INDEX: 1.75 CM/M2
ECHO AV AREA PEAK VELOCITY: 4.1 CM2
ECHO AV AREA PEAK VELOCITY: 4.8 CM2
ECHO AV AREA VTI: 4.6 CM2
ECHO AV AREA/BSA VTI: 2 CM2/M2
ECHO AV MEAN GRADIENT: 3 MMHG
ECHO AV MEAN VELOCITY: 0.9 M/S
ECHO AV PEAK GRADIENT: 6 MMHG
ECHO AV PEAK GRADIENT: 8 MMHG
ECHO AV PEAK VELOCITY: 1.2 M/S
ECHO AV PEAK VELOCITY: 1.4 M/S
ECHO AV VTI: 22.8 CM
ECHO BSA: 2.32 M2
ECHO LA DIAMETER INDEX: 1.58 CM/M2
ECHO LA DIAMETER: 3.6 CM
ECHO LA VOL A-L A2C: 39 ML (ref 18–58)
ECHO LA VOL A-L A4C: 37 ML (ref 18–58)
ECHO LA VOL BP: 37 ML (ref 18–58)
ECHO LA VOL MOD A2C: 38 ML (ref 18–58)
ECHO LA VOL MOD A4C: 33 ML (ref 18–58)
ECHO LA VOL/BSA BIPLANE: 16 ML/M2 (ref 16–34)
ECHO LA VOLUME AREA LENGTH: 40 ML
ECHO LA VOLUME INDEX A-L A2C: 17 ML/M2 (ref 16–34)
ECHO LA VOLUME INDEX A-L A4C: 16 ML/M2 (ref 16–34)
ECHO LA VOLUME INDEX AREA LENGTH: 18 ML/M2 (ref 16–34)
ECHO LA VOLUME INDEX MOD A2C: 17 ML/M2 (ref 16–34)
ECHO LA VOLUME INDEX MOD A4C: 14 ML/M2 (ref 16–34)
ECHO LV E' LATERAL VELOCITY: 6 CM/S
ECHO LV E' SEPTAL VELOCITY: 6 CM/S
ECHO LV EDV A2C: 153 ML
ECHO LV EDV A4C: 128 ML
ECHO LV EDV BP: 141 ML (ref 67–155)
ECHO LV EDV INDEX A4C: 56 ML/M2
ECHO LV EDV INDEX BP: 62 ML/M2
ECHO LV EDV NDEX A2C: 67 ML/M2
ECHO LV EJECTION FRACTION A2C: 49 %
ECHO LV EJECTION FRACTION A2C: 63 %
ECHO LV EJECTION FRACTION A4C: 44 %
ECHO LV EJECTION FRACTION BIPLANE: 46 % (ref 55–100)
ECHO LV ESV A2C: 78 ML
ECHO LV ESV A4C: 71 ML
ECHO LV ESV BP: 76 ML (ref 22–58)
ECHO LV ESV INDEX A2C: 34 ML/M2
ECHO LV ESV INDEX A4C: 31 ML/M2
ECHO LV ESV INDEX BP: 33 ML/M2
ECHO LV FRACTIONAL SHORTENING: 26 % (ref 28–44)
ECHO LV GLOBAL LONGITUDINAL STRAIN (GLS): -12.4 %
ECHO LV GLOBAL LONGITUDINAL STRAIN (GLS): -12.5 %
ECHO LV GLOBAL LONGITUDINAL STRAIN (GLS): -16.4 %
ECHO LV GLOBAL LONGITUDINAL STRAIN (GLS): -8.5 %
ECHO LV INTERNAL DIMENSION DIASTOLE INDEX: 2.37 CM/M2
ECHO LV INTERNAL DIMENSION DIASTOLIC: 5.4 CM (ref 4.2–5.9)
ECHO LV INTERNAL DIMENSION SYSTOLIC INDEX: 1.75 CM/M2
ECHO LV INTERNAL DIMENSION SYSTOLIC: 4 CM
ECHO LV IVSD: 1.3 CM (ref 0.6–1)
ECHO LV MASS 2D: 264.4 G (ref 88–224)
ECHO LV MASS INDEX 2D: 116 G/M2 (ref 49–115)
ECHO LV POSTERIOR WALL DIASTOLIC: 1.1 CM (ref 0.6–1)
ECHO LV RELATIVE WALL THICKNESS RATIO: 0.41
ECHO LVOT AREA: 4.9 CM2
ECHO LVOT AV VTI INDEX: 0.97
ECHO LVOT DIAM: 2.5 CM
ECHO LVOT MEAN GRADIENT: 4 MMHG
ECHO LVOT PEAK GRADIENT: 6 MMHG
ECHO LVOT PEAK VELOCITY: 1.2 M/S
ECHO LVOT STROKE VOLUME INDEX: 47.6 ML/M2
ECHO LVOT SV: 108.4 ML
ECHO LVOT VTI: 22.1 CM
ECHO MV A VELOCITY: 0.66 M/S
ECHO MV E DECELERATION TIME (DT): 186.7 MS
ECHO MV E VELOCITY: 0.65 M/S
ECHO MV E/A RATIO: 0.98
ECHO MV E/E' LATERAL: 10.83
ECHO MV E/E' RATIO (AVERAGED): 10.83
ECHO MV REGURGITANT PEAK GRADIENT: 104 MMHG
ECHO MV REGURGITANT PEAK VELOCITY: 5.1 M/S
ECHO PULMONARY ARTERY END DIASTOLIC PRESSURE: 3 MMHG
ECHO PV MAX VELOCITY: 1.1 M/S
ECHO PV PEAK GRADIENT: 5 MMHG
ECHO PV REGURGITANT MAX VELOCITY: 0.9 M/S
ECHO RV FREE WALL PEAK S': 17 CM/S
ECHO RV INTERNAL DIMENSION: 3.3 CM
ECHO RV TAPSE: 2.5 CM (ref 1.7–?)
ECHO TV REGURGITANT MAX VELOCITY: 2.06 M/S
ECHO TV REGURGITANT PEAK GRADIENT: 17 MMHG
EKG ATRIAL RATE: 56 BPM
EKG DIAGNOSIS: NORMAL
EKG P AXIS: 46 DEGREES
EKG P-R INTERVAL: 236 MS
EKG Q-T INTERVAL: 462 MS
EKG QRS DURATION: 90 MS
EKG QTC CALCULATION (BAZETT): 445 MS
EKG R AXIS: -24 DEGREES
EKG T AXIS: 175 DEGREES
EKG VENTRICULAR RATE: 56 BPM
GLUCOSE SERPL-MCNC: 108 MG/DL (ref 65–100)
GLUCOSE SERPL-MCNC: 123 MG/DL (ref 65–100)
GLUCOSE SERPL-MCNC: 155 MG/DL (ref 65–100)
HDLC SERPL-MCNC: 43 MG/DL
HDLC SERPL: 4 (ref 0–5)
LDLC SERPL CALC-MCNC: 115.4 MG/DL (ref 0–100)
MAGNESIUM SERPL-MCNC: 2 MG/DL (ref 1.6–2.4)
MAGNESIUM SERPL-MCNC: 2 MG/DL (ref 1.6–2.4)
POTASSIUM SERPL-SCNC: 2.5 MMOL/L (ref 3.5–5.1)
POTASSIUM SERPL-SCNC: 2.6 MMOL/L (ref 3.5–5.1)
POTASSIUM SERPL-SCNC: 3.2 MMOL/L (ref 3.5–5.1)
SODIUM SERPL-SCNC: 142 MMOL/L (ref 136–145)
TRIGL SERPL-MCNC: 68 MG/DL
TROPONIN I SERPL HS-MCNC: 1486 NG/L (ref 0–76)
TROPONIN I SERPL HS-MCNC: 1619 NG/L (ref 0–76)
VLDLC SERPL CALC-MCNC: 13.6 MG/DL

## 2024-02-01 PROCEDURE — 6370000000 HC RX 637 (ALT 250 FOR IP)

## 2024-02-01 PROCEDURE — 93356 MYOCRD STRAIN IMG SPCKL TRCK: CPT | Performed by: INTERNAL MEDICINE

## 2024-02-01 PROCEDURE — 6370000000 HC RX 637 (ALT 250 FOR IP): Performed by: EMERGENCY MEDICINE

## 2024-02-01 PROCEDURE — 80061 LIPID PANEL: CPT

## 2024-02-01 PROCEDURE — APPSS30 APP SPLIT SHARED TIME 16-30 MINUTES: Performed by: NURSE PRACTITIONER

## 2024-02-01 PROCEDURE — 6370000000 HC RX 637 (ALT 250 FOR IP): Performed by: STUDENT IN AN ORGANIZED HEALTH CARE EDUCATION/TRAINING PROGRAM

## 2024-02-01 PROCEDURE — 82088 ASSAY OF ALDOSTERONE: CPT

## 2024-02-01 PROCEDURE — 93010 ELECTROCARDIOGRAM REPORT: CPT | Performed by: SPECIALIST

## 2024-02-01 PROCEDURE — 99223 1ST HOSP IP/OBS HIGH 75: CPT | Performed by: STUDENT IN AN ORGANIZED HEALTH CARE EDUCATION/TRAINING PROGRAM

## 2024-02-01 PROCEDURE — 6360000002 HC RX W HCPCS

## 2024-02-01 PROCEDURE — 36415 COLL VENOUS BLD VENIPUNCTURE: CPT

## 2024-02-01 PROCEDURE — 84244 ASSAY OF RENIN: CPT

## 2024-02-01 PROCEDURE — 2060000000 HC ICU INTERMEDIATE R&B

## 2024-02-01 PROCEDURE — 83735 ASSAY OF MAGNESIUM: CPT

## 2024-02-01 PROCEDURE — 93306 TTE W/DOPPLER COMPLETE: CPT

## 2024-02-01 PROCEDURE — 82550 ASSAY OF CK (CPK): CPT

## 2024-02-01 PROCEDURE — 80048 BASIC METABOLIC PNL TOTAL CA: CPT

## 2024-02-01 PROCEDURE — 2580000003 HC RX 258

## 2024-02-01 PROCEDURE — 93306 TTE W/DOPPLER COMPLETE: CPT | Performed by: INTERNAL MEDICINE

## 2024-02-01 PROCEDURE — 94761 N-INVAS EAR/PLS OXIMETRY MLT: CPT

## 2024-02-01 PROCEDURE — 6370000000 HC RX 637 (ALT 250 FOR IP): Performed by: NURSE PRACTITIONER

## 2024-02-01 RX ORDER — ASPIRIN 81 MG/1
81 TABLET, CHEWABLE ORAL DAILY
Status: DISCONTINUED | OUTPATIENT
Start: 2024-02-02 | End: 2024-02-02 | Stop reason: HOSPADM

## 2024-02-01 RX ORDER — ONDANSETRON 4 MG/1
4 TABLET, ORALLY DISINTEGRATING ORAL EVERY 8 HOURS PRN
Status: DISCONTINUED | OUTPATIENT
Start: 2024-02-01 | End: 2024-02-02 | Stop reason: HOSPADM

## 2024-02-01 RX ORDER — LOSARTAN POTASSIUM 50 MG/1
100 TABLET ORAL DAILY
Status: DISCONTINUED | OUTPATIENT
Start: 2024-02-01 | End: 2024-02-02 | Stop reason: HOSPADM

## 2024-02-01 RX ORDER — MAGNESIUM SULFATE IN WATER 40 MG/ML
2000 INJECTION, SOLUTION INTRAVENOUS PRN
Status: DISCONTINUED | OUTPATIENT
Start: 2024-02-01 | End: 2024-02-02 | Stop reason: HOSPADM

## 2024-02-01 RX ORDER — SPIRONOLACTONE 25 MG/1
50 TABLET ORAL DAILY
Status: DISCONTINUED | OUTPATIENT
Start: 2024-02-01 | End: 2024-02-02 | Stop reason: HOSPADM

## 2024-02-01 RX ORDER — ACETAMINOPHEN 325 MG/1
650 TABLET ORAL EVERY 6 HOURS PRN
Status: DISCONTINUED | OUTPATIENT
Start: 2024-02-01 | End: 2024-02-02 | Stop reason: HOSPADM

## 2024-02-01 RX ORDER — ACETAMINOPHEN 650 MG/1
650 SUPPOSITORY RECTAL EVERY 6 HOURS PRN
Status: DISCONTINUED | OUTPATIENT
Start: 2024-02-01 | End: 2024-02-02 | Stop reason: HOSPADM

## 2024-02-01 RX ORDER — POTASSIUM CHLORIDE 750 MG/1
20 TABLET, FILM COATED, EXTENDED RELEASE ORAL
Status: COMPLETED | OUTPATIENT
Start: 2024-02-01 | End: 2024-02-01

## 2024-02-01 RX ORDER — AMLODIPINE BESYLATE 5 MG/1
10 TABLET ORAL DAILY
Status: DISCONTINUED | OUTPATIENT
Start: 2024-02-01 | End: 2024-02-02 | Stop reason: HOSPADM

## 2024-02-01 RX ORDER — HYDRALAZINE HYDROCHLORIDE 25 MG/1
50 TABLET, FILM COATED ORAL EVERY 8 HOURS
Status: DISCONTINUED | OUTPATIENT
Start: 2024-02-01 | End: 2024-02-02

## 2024-02-01 RX ORDER — LOSARTAN POTASSIUM 50 MG/1
50 TABLET ORAL 2 TIMES DAILY
Status: DISCONTINUED | OUTPATIENT
Start: 2024-02-01 | End: 2024-02-01

## 2024-02-01 RX ORDER — HYDROCHLOROTHIAZIDE 25 MG/1
25 TABLET ORAL DAILY
Status: DISCONTINUED | OUTPATIENT
Start: 2024-02-01 | End: 2024-02-01

## 2024-02-01 RX ORDER — POTASSIUM CHLORIDE 7.45 MG/ML
10 INJECTION INTRAVENOUS ONCE
Status: DISCONTINUED | OUTPATIENT
Start: 2024-02-01 | End: 2024-02-02 | Stop reason: HOSPADM

## 2024-02-01 RX ORDER — SODIUM CHLORIDE 9 MG/ML
INJECTION, SOLUTION INTRAVENOUS PRN
Status: DISCONTINUED | OUTPATIENT
Start: 2024-02-01 | End: 2024-02-02 | Stop reason: HOSPADM

## 2024-02-01 RX ORDER — POTASSIUM CHLORIDE 7.45 MG/ML
10 INJECTION INTRAVENOUS PRN
Status: DISCONTINUED | OUTPATIENT
Start: 2024-02-01 | End: 2024-02-01

## 2024-02-01 RX ORDER — ATORVASTATIN CALCIUM 20 MG/1
40 TABLET, FILM COATED ORAL DAILY
Status: DISCONTINUED | OUTPATIENT
Start: 2024-02-01 | End: 2024-02-02 | Stop reason: HOSPADM

## 2024-02-01 RX ORDER — POLYETHYLENE GLYCOL 3350 17 G/17G
17 POWDER, FOR SOLUTION ORAL DAILY PRN
Status: DISCONTINUED | OUTPATIENT
Start: 2024-02-01 | End: 2024-02-02 | Stop reason: HOSPADM

## 2024-02-01 RX ORDER — SODIUM CHLORIDE 0.9 % (FLUSH) 0.9 %
5-40 SYRINGE (ML) INJECTION EVERY 12 HOURS SCHEDULED
Status: DISCONTINUED | OUTPATIENT
Start: 2024-02-01 | End: 2024-02-02 | Stop reason: HOSPADM

## 2024-02-01 RX ORDER — ONDANSETRON 2 MG/ML
4 INJECTION INTRAMUSCULAR; INTRAVENOUS EVERY 6 HOURS PRN
Status: DISCONTINUED | OUTPATIENT
Start: 2024-02-01 | End: 2024-02-02 | Stop reason: HOSPADM

## 2024-02-01 RX ORDER — HYDRALAZINE HYDROCHLORIDE 20 MG/ML
10 INJECTION INTRAMUSCULAR; INTRAVENOUS ONCE
Status: COMPLETED | OUTPATIENT
Start: 2024-02-01 | End: 2024-02-01

## 2024-02-01 RX ORDER — CLONIDINE 0.1 MG/24H
1 PATCH, EXTENDED RELEASE TRANSDERMAL
Status: DISCONTINUED | OUTPATIENT
Start: 2024-02-01 | End: 2024-02-02 | Stop reason: HOSPADM

## 2024-02-01 RX ORDER — SODIUM CHLORIDE 0.9 % (FLUSH) 0.9 %
5-40 SYRINGE (ML) INJECTION PRN
Status: DISCONTINUED | OUTPATIENT
Start: 2024-02-01 | End: 2024-02-02 | Stop reason: HOSPADM

## 2024-02-01 RX ORDER — ATORVASTATIN CALCIUM 20 MG/1
20 TABLET, FILM COATED ORAL NIGHTLY
Status: DISCONTINUED | OUTPATIENT
Start: 2024-02-01 | End: 2024-02-02 | Stop reason: HOSPADM

## 2024-02-01 RX ORDER — POTASSIUM CHLORIDE 750 MG/1
40 TABLET, FILM COATED, EXTENDED RELEASE ORAL PRN
Status: DISCONTINUED | OUTPATIENT
Start: 2024-02-01 | End: 2024-02-01

## 2024-02-01 RX ORDER — SODIUM CHLORIDE, SODIUM LACTATE, POTASSIUM CHLORIDE, CALCIUM CHLORIDE 600; 310; 30; 20 MG/100ML; MG/100ML; MG/100ML; MG/100ML
INJECTION, SOLUTION INTRAVENOUS CONTINUOUS
Status: DISCONTINUED | OUTPATIENT
Start: 2024-02-01 | End: 2024-02-01

## 2024-02-01 RX ADMIN — POTASSIUM CHLORIDE 10 MEQ: 7.46 INJECTION, SOLUTION INTRAVENOUS at 05:26

## 2024-02-01 RX ADMIN — SPIRONOLACTONE 50 MG: 25 TABLET ORAL at 14:23

## 2024-02-01 RX ADMIN — SODIUM CHLORIDE, PRESERVATIVE FREE 10 ML: 5 INJECTION INTRAVENOUS at 09:15

## 2024-02-01 RX ADMIN — POTASSIUM CHLORIDE 20 MEQ: 750 TABLET, FILM COATED, EXTENDED RELEASE ORAL at 09:50

## 2024-02-01 RX ADMIN — POTASSIUM BICARBONATE 40 MEQ: 782 TABLET, EFFERVESCENT ORAL at 17:37

## 2024-02-01 RX ADMIN — POTASSIUM BICARBONATE 40 MEQ: 782 TABLET, EFFERVESCENT ORAL at 17:38

## 2024-02-01 RX ADMIN — SODIUM CHLORIDE, POTASSIUM CHLORIDE, SODIUM LACTATE AND CALCIUM CHLORIDE: 600; 310; 30; 20 INJECTION, SOLUTION INTRAVENOUS at 02:56

## 2024-02-01 RX ADMIN — ONDANSETRON 4 MG: 2 INJECTION INTRAMUSCULAR; INTRAVENOUS at 02:56

## 2024-02-01 RX ADMIN — SODIUM CHLORIDE, PRESERVATIVE FREE 10 ML: 5 INJECTION INTRAVENOUS at 23:53

## 2024-02-01 RX ADMIN — HYDRALAZINE HYDROCHLORIDE 50 MG: 25 TABLET ORAL at 23:52

## 2024-02-01 RX ADMIN — POTASSIUM CHLORIDE 20 MEQ: 750 TABLET, FILM COATED, EXTENDED RELEASE ORAL at 07:58

## 2024-02-01 RX ADMIN — HYDRALAZINE HYDROCHLORIDE 50 MG: 25 TABLET ORAL at 17:37

## 2024-02-01 RX ADMIN — AMLODIPINE BESYLATE 10 MG: 5 TABLET ORAL at 09:49

## 2024-02-01 RX ADMIN — LOSARTAN POTASSIUM 100 MG: 50 TABLET, FILM COATED ORAL at 14:23

## 2024-02-01 RX ADMIN — HYDRALAZINE HYDROCHLORIDE 10 MG: 20 INJECTION INTRAMUSCULAR; INTRAVENOUS at 01:39

## 2024-02-01 RX ADMIN — HYDROCHLOROTHIAZIDE 25 MG: 25 TABLET ORAL at 07:58

## 2024-02-01 RX ADMIN — ACETAMINOPHEN 650 MG: 325 TABLET ORAL at 04:36

## 2024-02-01 RX ADMIN — HYDRALAZINE HYDROCHLORIDE 50 MG: 25 TABLET ORAL at 09:50

## 2024-02-01 RX ADMIN — ATORVASTATIN CALCIUM 20 MG: 20 TABLET, FILM COATED ORAL at 23:52

## 2024-02-01 RX ADMIN — POTASSIUM CHLORIDE 20 MEQ: 750 TABLET, FILM COATED, EXTENDED RELEASE ORAL at 12:31

## 2024-02-01 RX ADMIN — POTASSIUM BICARBONATE 40 MEQ: 782 TABLET, EFFERVESCENT ORAL at 19:07

## 2024-02-01 RX ADMIN — POTASSIUM CHLORIDE 20 MEQ: 750 TABLET, FILM COATED, EXTENDED RELEASE ORAL at 06:18

## 2024-02-01 ASSESSMENT — ENCOUNTER SYMPTOMS
VOMITING: 0
ABDOMINAL DISTENTION: 0
ABDOMINAL PAIN: 0
DIARRHEA: 0
CHEST TIGHTNESS: 0
EYE PAIN: 0
BACK PAIN: 0
SINUS PAIN: 0
CONSTIPATION: 0
SORE THROAT: 0
COUGH: 0
SHORTNESS OF BREATH: 0
NAUSEA: 0

## 2024-02-01 ASSESSMENT — PAIN DESCRIPTION - LOCATION: LOCATION: HEAD

## 2024-02-01 ASSESSMENT — PAIN SCALES - GENERAL
PAINLEVEL_OUTOF10: 9
PAINLEVEL_OUTOF10: 0

## 2024-02-01 ASSESSMENT — PAIN DESCRIPTION - ORIENTATION: ORIENTATION: MID

## 2024-02-01 ASSESSMENT — PAIN DESCRIPTION - DESCRIPTORS: DESCRIPTORS: THROBBING

## 2024-02-01 NOTE — ED TRIAGE NOTES
Pt complaining of high blood pressure. Pt sts he \"dropped his 2 bp meds\" that he usually takes. Pt sts he has been taking his losartan twice a day be he had those pills. Pt sts he is supposed to be on HCTZ and Norvasc as well. Pt sts he was seen in an ER last month for high blood pressure as well and was given refills of his medications through them. PT axox4, clear speech, ambulatory with steady gait.

## 2024-02-01 NOTE — ED NOTES
Patient states that he is nauseous, provided patient with PRN Zofran. Patient denies any chest pain.

## 2024-02-01 NOTE — PROGRESS NOTES
Notified Dr. De Santiago of the patients potassium 2.6. No new orders received.     Jadyn Davies RN

## 2024-02-01 NOTE — ED NOTES
Bedside and Verbal shift change report given to Jadyn RN (oncoming nurse) by Edmond RN (offgoing nurse). Report included the following information Nurse Handoff Report, ED SBAR, MAR, Recent Results, and Neuro Assessment.

## 2024-02-01 NOTE — ED PROVIDER NOTES
[facility] EMERGENCY DEPT  EMERGENCY DEPARTMENT ENCOUNTER      Pt Name: Joshua Forman  MRN: [id number]  Birthdate 1970  Date of evaluation: 1/31/2024  Provider: George Hendrix MD    CHIEF COMPLAINT       Chief Complaint   Patient presents with    Hypertension         HISTORY OF PRESENT ILLNESS   (Location/Symptom, Timing/Onset, Context/Setting, Quality, Duration, Modifying Factors, Severity)  Note limiting factors.   53-year-old male with a past medical history significant for hypertension who presents to the ER by EMS for evaluation for elevated blood pressure.  The patient states that his blood pressure at home was 220/110.  He is also complaining of intermittent episodes of chest pain and headache that has subsided at this time.  The patient ran out of his blood pressure medication for over a week or so and has not been able to get back to his PCP for refill.  He denies any fever, cough, congestion, sore throat, ear pain, chest pain or shortness of breath with exertion, vomiting, abdominal pain, diarrhea, constipation, dysuria, dizziness, extremity weakness or numbness, sick contact, skin rash or recent travel, prior history of the same.            Review of External Medical Records:     Nursing Notes were reviewed.    REVIEW OF SYSTEMS    (2-9 systems for level 4, 10 or more for level 5)     Review of Systems   All other systems reviewed and are negative.      Except as noted above the remainder of the review of systems was reviewed and negative.       PAST MEDICAL HISTORY     Past Medical History:   Diagnosis Date    Hypertension          SURGICAL HISTORY       Past Surgical History:   Procedure Laterality Date    APPENDECTOMY      HEENT      tonsillectomy         CURRENT MEDICATIONS       Previous Medications    AMLODIPINE (NORVASC) 10 MG TABLET    Take 1 tablet by mouth daily    ASPIRIN 81 MG EC TABLET    Take 1 tablet by mouth daily    ATORVASTATIN (LIPITOR) 40 MG TABLET    Take 1 tablet by mouth daily  hospitalization.            REASSESSMENT     ED Course as of 01/31/24 2306   Wed Jan 31, 2024   2150 EKG 12 Lead  Normal sinus rhythm, 56 bpm, left ventricular hypertrophy with repolarization abnormality, no STEMI, no ectopy [IO]      ED Course User Index  [IO] Sherri Jalloh MD       PROGRESS NOTE:    Pt has been reexamined by George Hendrix MD all available results have been reviewed with pt and any available family. Pt understands sx, dx, and tx in ED. Care plan has been outlined and questions have been answered. Pt and any available family understands and agrees to need for admission to hospital for further tx not available in ED. Pt is ready for admission.  Will consult cardiology and hospitalist team.  Written by George Hendrix MD,  11:06 PM     CONSULT NOTE:  George Hendrix MD spoke with Dr. Becerril of cardiology discussed patient's presentation, history, physical assessment, and available diagnostic results.  Recommends blood pressure management and states that the patient does not need any other anticoagulation besides aspirin at this time.  Wants patient admitted to the hospitalist team and will see patient in consult in the morning..     CONSULT NOTE:  George Hendrix MD spoke with Dr. Reyna of the adult hospitalist team. Discussed patient's presentation, history, physical assessment, and available diagnostic results. He will evaluate, write orders and admit the patient to the hospital. 11:06 PM     Perfect Serve Consult for Admission  11:06 PM    ED Room Number: WER16/16  Patient Name and age:  Joshua Forman 53 y.o.  male  Working Diagnosis:   1. Hypertensive urgency    2. Hypokalemia    3. NSTEMI (non-ST elevated myocardial infarction) (HCC)    4. Hypertensive heart disease without heart failure        COVID-19 Suspicion: No  Sepsis present:  No  Reassessment needed: No  Code Status:  Full Code  Readmission: No  Isolation Requirements: no  Recommended Level of Care: telemetry  Department:

## 2024-02-01 NOTE — H&P
Determinants of Health     Financial Resource Strain: Not on file   Food Insecurity: Not on file   Transportation Needs: Not on file   Physical Activity: Not on file   Stress: Not on file   Social Connections: Not on file   Intimate Partner Violence: Not on file   Housing Stability: Not on file       Family History  Family History   Problem Relation Age of Onset    Hypertension Mother     Hypertension Father     Diabetes Mother           Alcohol history: Socially, 1-2 beers per week  Smoking history: Non-smoker  Illicit drug history: Not at all  Living arrangement: patient lives with their family.  Ambulates: Independently     Code status: Full Code  Code status discussed with the patient/caregivers.      Physical Exam  Vitals:    02/01/24 0139   BP: (!) 203/114   Pulse:    Resp:    Temp:    SpO2:        General: No acute distress. Alert. Cooperative.  Head: Normocephalic. Atraumatic.  Mouth: Mucosa pink. Moist mucous membranes.   Respiratory: CTAB. No w/r/r/c.  Cardiovascular: RRR. No m/r/g. No accessory muscle use.  GI: Nondistended.+ bowel sounds. Nontender. No rebound tenderness or guarding.   Extremities: No LE edema. Moving all extremities spontaneously.  Musculoskeletal: Full ROM in all extremities, no obvious deformities.  Skin: Warm, dry. No rashes.   Neuro: Alert, normal behavior. No focal deficit.         Assessment and Plan     Joshua Forman is a 53 y.o. male with PMHx of HTN, Pre-Diabetes, Allergic Rhinitis, HLD  who is admitted for NSTEMI.    NSTEMI in the setting of Hypertensive Emergency: Presenting with 1 week of elevated home blood pressures; SBP >200 and DBP >110. Labs notable for initial Trop 1641.  Repeat trop 1419 at 23:58 on 01/31. . K 2.5. POA /112, . EKG Sinus Tito with 1st degree AV Block, LVH with repolarization abnormality and nonspecific ST changes that is unchanged from 2021. Follows with outpatient cardiology, Dr. Ramirez. Last Echo 7/2022 EF 56%, Moderate  Complaint   Patient presents with    Hypertension       Principal Problem:    NSTEMI (non-ST elevated myocardial infarction) (HCC)  Active Problems:    Elevated serum creatinine    Essential hypertension    Elevated troponin    Lipid disorder    Leukopenia    Hypertensive emergency  Resolved Problems:    * No resolved hospital problems. *

## 2024-02-01 NOTE — ED NOTES
TRANSFER - OUT REPORT:    Verbal report given to RN [facility] on Joshua Forman  being transferred to [facility] ER for routine progression of patient care       Report consisted of patient's Situation, Background, Assessment and   Recommendations(SBAR).     Information from the following report(s) ED SBAR, Intake/Output, MAR, and Recent Results was reviewed with the receiving nurse.    [place] Fall Assessment:    Presents to emergency department  because of falls (Syncope, seizure, or loss of consciousness): No  Age > 70: No  Altered Mental Status, Intoxication with alcohol or substance confusion (Disorientation, impaired judgment, poor safety awaremess, or inability to follow instructions): No  Impaired Mobility: Ambulates or transfers with assistive devices or assistance; Unable to ambulate or transer.: No  Nursing Judgement: No          Lines:   Peripheral IV 01/31/24 Right Antecubital (Active)        Opportunity for questions and clarification was provided.      Patient transported with:  Monitor, H2H ETA 0000, SECOND TROP drawn prior to transport.

## 2024-02-01 NOTE — DISCHARGE SUMMARY
[id number] [id number]   Office [phone]  Fax [phone]       Discharge / Transfer / Off-Service Note     Name: Joshua Forman MRN: [id number]  Sex: Male   YOB: 1970  Age: 53 y.o.  PCP: Gildardo Flores MD     Date of admission: 1/31/2024  Date of discharge/transfer: 2/2/2024    Attending physician at admission: Perez Encarnacion MD.  Attending physician at discharge/transfer: Raza Broderick MD  Resident physician at discharge/transfer: Jolene Dillon MD     Consultants during hospitalization  IP CONSULT TO CARDIOLOGY     Admission diagnoses   Hypokalemia [E87.6]  Hypertensive heart disease without heart failure [I11.9]  Elevated troponin [R79.89]  NSTEMI (non-ST elevated myocardial infarction) (HCC) [I21.4]  Hypertensive urgency [I16.0]    Recommended follow-up after discharge    1. PCP - Gildardo Flores MD  2. Cardiology     To follow up on with PCP:   - Resistant hypertension  - CBC, BMP (monitor K level)  - Discuss adherence to medications  - Workup for secondary hypertension  ------------------------------------------------------------------------------------------------------------------    History of Present Illness    As per admitting provider, Dr. Kruse:   \"Patient complaining of ongoing headaches with elevated home blood pressures. Symptoms worsening over the past 1 week. Highest recorded pressures were \"200s/110s\". States that he has been compliant with 1 out of 4 of his prescribed blood pressure medications. He has been taking Losartan 50 mg bid. However patient ran out of his other 3 medications and has not taken them in over 2 weeks (Hydralazine 50 mg tid, Hydrochlorothiazide 25 mg daily and Amlodipine 10 mg daily). States he has not been able to make it to the PCP for follow up. He denies ever experiencing chest pain. Shares he has history of left ventricular hypertrophy and follows with cardiology out patient. Denies history of prior heart attack or  stroke. Denies recreational drug use or taking medications that are not prescribed to him. Also denies shortness of breath, visual disturbances, dizziness, eye pain, nausea/vomiting, MSK pain, diarrhea/constipation, numbness/tinging.\"    Hospital course  Joshua Forman was admitted into the Family Medicine Service from 1/31/2024 to 02/02/24 for Hypokalemia [E87.6]  Hypertensive heart disease without heart failure [I11.9]  Elevated troponin [R79.89]  NSTEMI (non-ST elevated myocardial infarction) (HCC) [I21.4]  Hypertensive urgency [I16.0]. Pt was admitted for hypertensive emergency 2/2 medication noncompliance and found to have elevated troponins. Cardiology was consulted during this hospitalization who believe troponin level are likely due to demand ischemia and recommend follow up outpatient.    During the course of this admission, the following conditions were addressed/managed:    Hypertensive Emergency: Presenting with 1 week of elevated home blood pressures; SBP >200 and DBP >110.  . K 2.5. POA /112, . Previous Echo 7/2022 EF 56%, Moderate concentric hypertrophy, Grade I Systolic Dysfunction, Mild dilation of left atrium. Home ASA 81 mg, Lipitor 40 mg, Home Amlodipine 10 mg daily, Hydralazine 50 mg tid, Hydrochlorothiazide 25 mg qd and Losartan 50 mg bid. EF 60-65% (2/1), no wall motion abnormality. Cardiology consulted, who recommend BP control by restarting medications and ASA therapy and outpatient follow up.  - Hydralazine 50mg increased to q6h  - Spironolactone 50mg started on 2/1  - Losartan 100mg qd  - Clonidine 0.1 patch, once blood pressures are better controlled, consider discontinuing this medication  - Amlodipine 10mg qd  - Discontinue HCTZ 25mg qd  - Monitor potassium levels outpatient and consider starting potassium supplements if continuing to have low levels despite aldactone  - Continue secondary HTN workup: CHAYO (sleep study OP), Aldosterone, Renin, renal artery

## 2024-02-01 NOTE — ED NOTES
TRANSFER - OUT REPORT:    Verbal report given to [facility] on Joshua Forman  being transferred to [facility]  for routine progression of patient care       Report consisted of patient's Situation, Background, Assessment and   Recommendations(SBAR).     Information from the following report(s) ED SBAR, MAR, Recent Results, and Cardiac Rhythm eleni with 1 degree block  was reviewed with the receiving nurse.    Hollister Fall Assessment:    Presents to emergency department  because of falls (Syncope, seizure, or loss of consciousness): No  Age > 70: No  Altered Mental Status, Intoxication with alcohol or substance confusion (Disorientation, impaired judgment, poor safety awaremess, or inability to follow instructions): No  Impaired Mobility: Ambulates or transfers with assistive devices or assistance; Unable to ambulate or transer.: No  Nursing Judgement: No          Lines:   Peripheral IV 01/31/24 Right Antecubital (Active)        Opportunity for questions and clarification was provided.      Patient transported with:  Monitor, Patient awake and alert oriented x4, VSS. Patient denies pain. Family updated by patient.          no hallucinations

## 2024-02-01 NOTE — ED NOTES
Critical troponin called from lab, information forwarded to [facility] ER RN TK patient enroute at this time.

## 2024-02-01 NOTE — CONSULTS
[facility] CARDIOLOGY                    Cardiology Care Note     [x]Initial Encounter     []Follow-up    Patient Name: Joshua Forman - :1970 - MRN:[id number]  Primary Cardiologist: Mikey Becerril MD  Consulting Cardiologist: Mikey Becerril MD     Reason for encounter: chest pain     HPI:       Joshua Forman is a 53 y.o. male with PMH of HTN, Pre-Diabetes, Allergic Rhinitis, HLD who presents to the ER with concern over elevated BP and headaches.      Patient complaining of ongoing headaches with elevated home blood pressures. Symptoms worsening over the past 1 week. Highest recorded pressures were \"200s/110s\". States that he has been compliant with 1 out of 4 of his prescribed blood pressure medications. He has been taking Losartan 50 mg bid. However patient ran out of his other 3 medications and has not taken them in over 2 weeks (Hydralazine 50 mg tid, Hydrochlorothiazide 25 mg daily and Amlodipine 10 mg daily). States he has not been able to make it to the PCP for follow up. He denies ever experiencing chest pain.     History of left ventricular hypertrophy and follows with Dr Becerril.    Denies history of prior heart attack or stroke. Denies recreational drug use.  Also denies shortness of breath, visual disturbances, dizziness, eye pain, nausea/vomiting, MSK pain, diarrhea/constipation, numbness/tinging.     Subjective:      Joshua Forman reports  mild headache .     Assessment and Plan     1 Uncontrolled HTN: ran out of 3 of his 4 BP meds 2 weeks ago. Resume home meds. Obtain ECHO.   2 . Elevated troponin: recheck, likely demand ischemia from # 1. No plans for ischemic eval today  3 hypokalemia: replete and recheck.   4 HLD:          ____________________________________________________________    Cardiac testing  22    TTE    Left Ventricle: Normal left ventricular systolic function with a visually estimated EF of 55 - 60%. EF by 2D Simpsons Biplane is 56%. Left ventricle size is  pathology.  BONES: No aggressive bone lesion or fracture.    Impression  Negative for pulmonary embolus. No evidence of acute process.      Lab Results   Component Value Date    WBC 3.0 (L) 01/31/2024    HGB 12.6 01/31/2024    HCT 34.5 (L) 01/31/2024    MCV 76.5 (L) 01/31/2024     01/31/2024       No results for input(s): \"CHOL\", \"HDLC\", \"LDLC\", \"HBA1C\" in the last 72 hours.    Invalid input(s): \"TGL\"    Lab Results   Component Value Date/Time     02/01/2024 04:37 AM    K 2.5 02/01/2024 04:37 AM     02/01/2024 04:37 AM    CO2 32 02/01/2024 04:37 AM    BUN 12 02/01/2024 04:37 AM    CREATININE 1.16 02/01/2024 04:37 AM    GLUCOSE 123 02/01/2024 04:37 AM    CALCIUM 8.6 02/01/2024 04:37 AM    LABGLOM >60 02/01/2024 04:37 AM    LABGLOM 72 01/03/2023 12:00 AM      No results found for: \"BNP\"        Current meds:    Current Facility-Administered Medications:     sodium chloride flush 0.9 % injection 5-40 mL, 5-40 mL, IntraVENous, 2 times per day, Gildardo White MD    sodium chloride flush 0.9 % injection 5-40 mL, 5-40 mL, IntraVENous, PRN, Gildardo White MD    0.9 % sodium chloride infusion, , IntraVENous, PRN, Gildardo White MD    magnesium sulfate 2000 mg in 50 mL IVPB premix, 2,000 mg, IntraVENous, PRN, Gildardo White MD    ondansetron (ZOFRAN-ODT) disintegrating tablet 4 mg, 4 mg, Oral, Q8H PRN **OR** ondansetron (ZOFRAN) injection 4 mg, 4 mg, IntraVENous, Q6H PRN, Gildardo White MD, 4 mg at 02/01/24 0256    acetaminophen (TYLENOL) tablet 650 mg, 650 mg, Oral, Q6H PRN, 650 mg at 02/01/24 0436 **OR** acetaminophen (TYLENOL) suppository 650 mg, 650 mg, Rectal, Q6H PRN, Gildardo White MD    polyethylene glycol (GLYCOLAX) packet 17 g, 17 g, Oral, Daily PRN, Gildardo White MD    [START ON 2/2/2024] aspirin chewable tablet 81 mg, 81 mg, Oral, Daily, Gildardo White MD    potassium chloride 10 mEq/100 mL IVPB (Peripheral Line), 10 mEq, IntraVENous, Once, Gildardo White MD

## 2024-02-02 VITALS
RESPIRATION RATE: 17 BRPM | OXYGEN SATURATION: 97 % | HEART RATE: 67 BPM | SYSTOLIC BLOOD PRESSURE: 151 MMHG | WEIGHT: 230 LBS | DIASTOLIC BLOOD PRESSURE: 96 MMHG | BODY MASS INDEX: 30.48 KG/M2 | HEIGHT: 73 IN | TEMPERATURE: 97.9 F

## 2024-02-02 LAB
ANION GAP SERPL CALC-SCNC: 3 MMOL/L (ref 5–15)
ANION GAP SERPL CALC-SCNC: 4 MMOL/L (ref 5–15)
BUN SERPL-MCNC: 15 MG/DL (ref 6–20)
BUN SERPL-MCNC: 16 MG/DL (ref 6–20)
BUN/CREAT SERPL: 12 (ref 12–20)
BUN/CREAT SERPL: 12 (ref 12–20)
CALCIUM SERPL-MCNC: 8.8 MG/DL (ref 8.5–10.1)
CALCIUM SERPL-MCNC: 9.2 MG/DL (ref 8.5–10.1)
CHLORIDE SERPL-SCNC: 103 MMOL/L (ref 97–108)
CHLORIDE SERPL-SCNC: 105 MMOL/L (ref 97–108)
CO2 SERPL-SCNC: 32 MMOL/L (ref 21–32)
CO2 SERPL-SCNC: 34 MMOL/L (ref 21–32)
CREAT SERPL-MCNC: 1.24 MG/DL (ref 0.7–1.3)
CREAT SERPL-MCNC: 1.32 MG/DL (ref 0.7–1.3)
ERYTHROCYTE [DISTWIDTH] IN BLOOD BY AUTOMATED COUNT: 13 % (ref 11.5–14.5)
GLUCOSE SERPL-MCNC: 151 MG/DL (ref 65–100)
GLUCOSE SERPL-MCNC: 155 MG/DL (ref 65–100)
HCT VFR BLD AUTO: 36.5 % (ref 36.6–50.3)
HGB BLD-MCNC: 13.1 G/DL (ref 12.1–17)
MAGNESIUM SERPL-MCNC: 1.9 MG/DL (ref 1.6–2.4)
MCH RBC QN AUTO: 27.3 PG (ref 26–34)
MCHC RBC AUTO-ENTMCNC: 35.9 G/DL (ref 30–36.5)
MCV RBC AUTO: 76 FL (ref 80–99)
NRBC # BLD: 0 K/UL (ref 0–0.01)
NRBC BLD-RTO: 0 PER 100 WBC
PLATELET # BLD AUTO: 293 K/UL (ref 150–400)
PMV BLD AUTO: 9.6 FL (ref 8.9–12.9)
POTASSIUM SERPL-SCNC: 2.7 MMOL/L (ref 3.5–5.1)
POTASSIUM SERPL-SCNC: 3.1 MMOL/L (ref 3.5–5.1)
RBC # BLD AUTO: 4.8 M/UL (ref 4.1–5.7)
SODIUM SERPL-SCNC: 140 MMOL/L (ref 136–145)
SODIUM SERPL-SCNC: 141 MMOL/L (ref 136–145)
WBC # BLD AUTO: 3.4 K/UL (ref 4.1–11.1)

## 2024-02-02 PROCEDURE — 2580000003 HC RX 258

## 2024-02-02 PROCEDURE — 99239 HOSP IP/OBS DSCHRG MGMT >30: CPT | Performed by: STUDENT IN AN ORGANIZED HEALTH CARE EDUCATION/TRAINING PROGRAM

## 2024-02-02 PROCEDURE — 94761 N-INVAS EAR/PLS OXIMETRY MLT: CPT

## 2024-02-02 PROCEDURE — 80048 BASIC METABOLIC PNL TOTAL CA: CPT

## 2024-02-02 PROCEDURE — 36415 COLL VENOUS BLD VENIPUNCTURE: CPT

## 2024-02-02 PROCEDURE — 6370000000 HC RX 637 (ALT 250 FOR IP): Performed by: STUDENT IN AN ORGANIZED HEALTH CARE EDUCATION/TRAINING PROGRAM

## 2024-02-02 PROCEDURE — 85027 COMPLETE CBC AUTOMATED: CPT

## 2024-02-02 PROCEDURE — 6360000002 HC RX W HCPCS

## 2024-02-02 PROCEDURE — 6370000000 HC RX 637 (ALT 250 FOR IP)

## 2024-02-02 PROCEDURE — 2500000003 HC RX 250 WO HCPCS

## 2024-02-02 PROCEDURE — 83735 ASSAY OF MAGNESIUM: CPT

## 2024-02-02 RX ORDER — POTASSIUM CHLORIDE 750 MG/1
40 TABLET, FILM COATED, EXTENDED RELEASE ORAL ONCE
Status: COMPLETED | OUTPATIENT
Start: 2024-02-02 | End: 2024-02-02

## 2024-02-02 RX ORDER — HYDRALAZINE HYDROCHLORIDE 25 MG/1
50 TABLET, FILM COATED ORAL EVERY 6 HOURS SCHEDULED
Status: DISCONTINUED | OUTPATIENT
Start: 2024-02-02 | End: 2024-02-02 | Stop reason: HOSPADM

## 2024-02-02 RX ORDER — HYDRALAZINE HYDROCHLORIDE 50 MG/1
50 TABLET, FILM COATED ORAL 4 TIMES DAILY
Qty: 120 TABLET | Refills: 0 | Status: SHIPPED | OUTPATIENT
Start: 2024-02-02

## 2024-02-02 RX ORDER — AMLODIPINE BESYLATE 10 MG/1
10 TABLET ORAL DAILY
Qty: 30 TABLET | Refills: 0 | Status: SHIPPED | OUTPATIENT
Start: 2024-02-02

## 2024-02-02 RX ORDER — HYDRALAZINE HYDROCHLORIDE 20 MG/ML
10 INJECTION INTRAMUSCULAR; INTRAVENOUS ONCE
Status: COMPLETED | OUTPATIENT
Start: 2024-02-02 | End: 2024-02-02

## 2024-02-02 RX ORDER — MAGNESIUM SULFATE HEPTAHYDRATE 40 MG/ML
2000 INJECTION, SOLUTION INTRAVENOUS ONCE
Status: COMPLETED | OUTPATIENT
Start: 2024-02-02 | End: 2024-02-02

## 2024-02-02 RX ORDER — CLONIDINE 0.1 MG/24H
1 PATCH, EXTENDED RELEASE TRANSDERMAL
Qty: 4 PATCH | Refills: 0 | Status: SHIPPED | OUTPATIENT
Start: 2024-02-08

## 2024-02-02 RX ORDER — SODIUM CHLORIDE, SODIUM LACTATE, POTASSIUM CHLORIDE, AND CALCIUM CHLORIDE .6; .31; .03; .02 G/100ML; G/100ML; G/100ML; G/100ML
500 INJECTION, SOLUTION INTRAVENOUS ONCE
Status: COMPLETED | OUTPATIENT
Start: 2024-02-02 | End: 2024-02-02

## 2024-02-02 RX ORDER — POTASSIUM CHLORIDE 750 MG/1
40 TABLET, FILM COATED, EXTENDED RELEASE ORAL
Status: COMPLETED | OUTPATIENT
Start: 2024-02-02 | End: 2024-02-02

## 2024-02-02 RX ORDER — POTASSIUM CHLORIDE 750 MG/1
20 TABLET, FILM COATED, EXTENDED RELEASE ORAL ONCE
Status: COMPLETED | OUTPATIENT
Start: 2024-02-02 | End: 2024-02-02

## 2024-02-02 RX ORDER — LOSARTAN POTASSIUM 100 MG/1
100 TABLET ORAL DAILY
Qty: 30 TABLET | Refills: 0 | Status: SHIPPED | OUTPATIENT
Start: 2024-02-03

## 2024-02-02 RX ORDER — SPIRONOLACTONE 50 MG/1
50 TABLET, FILM COATED ORAL DAILY
Qty: 30 TABLET | Refills: 0 | Status: SHIPPED | OUTPATIENT
Start: 2024-02-03

## 2024-02-02 RX ORDER — POTASSIUM CHLORIDE 7.45 MG/ML
10 INJECTION INTRAVENOUS ONCE
Status: COMPLETED | OUTPATIENT
Start: 2024-02-02 | End: 2024-02-02

## 2024-02-02 RX ADMIN — HYDRALAZINE HYDROCHLORIDE 50 MG: 25 TABLET ORAL at 12:22

## 2024-02-02 RX ADMIN — POTASSIUM CHLORIDE 10 MEQ: 7.46 INJECTION, SOLUTION INTRAVENOUS at 04:56

## 2024-02-02 RX ADMIN — ASPIRIN 81 MG: 81 TABLET, CHEWABLE ORAL at 08:32

## 2024-02-02 RX ADMIN — POTASSIUM CHLORIDE 40 MEQ: 750 TABLET, FILM COATED, EXTENDED RELEASE ORAL at 08:32

## 2024-02-02 RX ADMIN — AMLODIPINE BESYLATE 10 MG: 5 TABLET ORAL at 08:32

## 2024-02-02 RX ADMIN — SODIUM CHLORIDE, POTASSIUM CHLORIDE, SODIUM LACTATE AND CALCIUM CHLORIDE 500 ML: 600; 310; 30; 20 INJECTION, SOLUTION INTRAVENOUS at 01:04

## 2024-02-02 RX ADMIN — POTASSIUM CHLORIDE 40 MEQ: 750 TABLET, FILM COATED, EXTENDED RELEASE ORAL at 05:01

## 2024-02-02 RX ADMIN — POTASSIUM CHLORIDE 20 MEQ: 750 TABLET, FILM COATED, EXTENDED RELEASE ORAL at 01:05

## 2024-02-02 RX ADMIN — SODIUM CHLORIDE, PRESERVATIVE FREE 10 ML: 5 INJECTION INTRAVENOUS at 08:35

## 2024-02-02 RX ADMIN — HYDRALAZINE HYDROCHLORIDE 10 MG: 20 INJECTION, SOLUTION INTRAMUSCULAR; INTRAVENOUS at 04:56

## 2024-02-02 RX ADMIN — POTASSIUM CHLORIDE 40 MEQ: 750 TABLET, FILM COATED, EXTENDED RELEASE ORAL at 13:51

## 2024-02-02 RX ADMIN — LOSARTAN POTASSIUM 100 MG: 50 TABLET, FILM COATED ORAL at 08:31

## 2024-02-02 RX ADMIN — SPIRONOLACTONE 50 MG: 25 TABLET ORAL at 08:32

## 2024-02-02 RX ADMIN — MAGNESIUM SULFATE HEPTAHYDRATE 2000 MG: 40 INJECTION, SOLUTION INTRAVENOUS at 08:30

## 2024-02-02 ASSESSMENT — PAIN SCALES - GENERAL
PAINLEVEL_OUTOF10: 0
PAINLEVEL_OUTOF10: 0

## 2024-02-02 NOTE — CARE COORDINATION
Patient with low readmission risk score of 6%.  No anticipated CM needs.  Please consult CM should any discharge needs arise.  DEVAN Jansen

## 2024-02-02 NOTE — DISCHARGE INSTRUCTIONS
HOME DISCHARGE INSTRUCTIONS    Joshua Forman / [id number] : 1970    Admission date: 2024 Discharge date: 2024     Please bring this form with you to show your care provider at your follow-up appointment.    Primary care provider:  Gildardo Flores    Discharging provider:  Jolene Dillon MD  - Family Medicine Resident  Raza Broderick MD - Family Medicine Attending      You have been admitted to the hospital with the following diagnoses:    ACUTE DIAGNOSES:  Hypokalemia [E87.6]  Hypertensive heart disease without heart failure [I11.9]  Elevated troponin [R79.89]  NSTEMI (non-ST elevated myocardial infarction) (HCC) [I21.4]  Hypertensive urgency [I16.0]    . . . . . . . . . . . . . . . . . . . . . . . . . . . . . . . . . . . . . . . . . . . . . . . . . . . . . . . . . . . . . . . . . . . . . . . .   You were hospitalized for very high blood pressures, most likely since you didn't have refills on your medications. The cardiologist evaluated you and recommended that you restart medications. An echocardiogram was done and did not show any new changes. Medication changes were made, see below. Your potassium was low, so you received supplements. Please make sure to take all of your medications regularly. Stopping any of these medications is dangerous. Please follow up with your PCP and cardiologist.   . . . . . . . . . . . . . . . . . . . . . . . . . . . . . . . . . . . . . . . . . . . . . . . . . . . . . . . . . . . . . . . . . . . . . . . .     MEDICATION CHANGES  START  - Start taking spironolactone (aldactone) 50mg one tablet daily.     STOP  - Stop taking hydrochlorothiazide (HCTZ).     CHANGES  - Take hydralazine 50mg every 6 hours. This is increased from every 8 hours, previously.     No other changes were made to your medications, please take all your other home medicines as previously prescribed.    FOLLOW-UP CARE RECOMMENDATIONS:   1. Please see your PCP Gildardo Flores to follow up with

## 2024-02-02 NOTE — PLAN OF CARE
Problem: Discharge Planning  Goal: Discharge to home or other facility with appropriate resources  2/2/2024 0746 by Pamela Javier, RN  Outcome: Progressing  2/2/2024 0745 by Pamela Javier, RN  Outcome: Progressing

## 2024-02-05 ENCOUNTER — OFFICE VISIT (OUTPATIENT)
Age: 54
End: 2024-02-05
Payer: MEDICAID

## 2024-02-05 VITALS
DIASTOLIC BLOOD PRESSURE: 76 MMHG | OXYGEN SATURATION: 98 % | SYSTOLIC BLOOD PRESSURE: 124 MMHG | RESPIRATION RATE: 18 BRPM | BODY MASS INDEX: 30.67 KG/M2 | WEIGHT: 232.5 LBS | HEART RATE: 73 BPM

## 2024-02-05 DIAGNOSIS — I1A.0 RESISTANT HYPERTENSION: Primary | ICD-10-CM

## 2024-02-05 DIAGNOSIS — I25.2 HISTORY OF NON-ST ELEVATION MYOCARDIAL INFARCTION (NSTEMI): ICD-10-CM

## 2024-02-05 PROBLEM — E87.6 HYPOKALEMIA: Status: ACTIVE | Noted: 2024-02-05

## 2024-02-05 PROBLEM — I11.9 HYPERTENSIVE HEART DISEASE WITHOUT HEART FAILURE: Status: ACTIVE | Noted: 2024-02-05

## 2024-02-05 PROCEDURE — 99214 OFFICE O/P EST MOD 30 MIN: CPT | Performed by: STUDENT IN AN ORGANIZED HEALTH CARE EDUCATION/TRAINING PROGRAM

## 2024-02-05 PROCEDURE — 3078F DIAST BP <80 MM HG: CPT | Performed by: STUDENT IN AN ORGANIZED HEALTH CARE EDUCATION/TRAINING PROGRAM

## 2024-02-05 PROCEDURE — 3074F SYST BP LT 130 MM HG: CPT | Performed by: STUDENT IN AN ORGANIZED HEALTH CARE EDUCATION/TRAINING PROGRAM

## 2024-02-05 RX ORDER — HYDRALAZINE HYDROCHLORIDE 50 MG/1
50 TABLET, FILM COATED ORAL 4 TIMES DAILY
Qty: 120 TABLET | Refills: 2 | Status: SHIPPED | OUTPATIENT
Start: 2024-02-05

## 2024-02-05 RX ORDER — AMLODIPINE BESYLATE 10 MG/1
10 TABLET ORAL DAILY
Qty: 30 TABLET | Refills: 2 | Status: SHIPPED | OUTPATIENT
Start: 2024-02-05

## 2024-02-05 RX ORDER — LOSARTAN POTASSIUM 100 MG/1
100 TABLET ORAL DAILY
Qty: 30 TABLET | Refills: 2 | Status: SHIPPED | OUTPATIENT
Start: 2024-02-05

## 2024-02-05 RX ORDER — CLONIDINE 0.1 MG/24H
1 PATCH, EXTENDED RELEASE TRANSDERMAL
Qty: 4 PATCH | Refills: 2 | Status: SHIPPED | OUTPATIENT
Start: 2024-02-08

## 2024-02-05 RX ORDER — SPIRONOLACTONE 50 MG/1
50 TABLET, FILM COATED ORAL DAILY
Qty: 30 TABLET | Refills: 2 | Status: SHIPPED | OUTPATIENT
Start: 2024-02-05

## 2024-02-05 SDOH — ECONOMIC STABILITY: FOOD INSECURITY: WITHIN THE PAST 12 MONTHS, THE FOOD YOU BOUGHT JUST DIDN'T LAST AND YOU DIDN'T HAVE MONEY TO GET MORE.: NEVER TRUE

## 2024-02-05 SDOH — ECONOMIC STABILITY: HOUSING INSECURITY
IN THE LAST 12 MONTHS, WAS THERE A TIME WHEN YOU DID NOT HAVE A STEADY PLACE TO SLEEP OR SLEPT IN A SHELTER (INCLUDING NOW)?: NO

## 2024-02-05 SDOH — ECONOMIC STABILITY: INCOME INSECURITY: HOW HARD IS IT FOR YOU TO PAY FOR THE VERY BASICS LIKE FOOD, HOUSING, MEDICAL CARE, AND HEATING?: NOT HARD AT ALL

## 2024-02-05 SDOH — ECONOMIC STABILITY: FOOD INSECURITY: WITHIN THE PAST 12 MONTHS, YOU WORRIED THAT YOUR FOOD WOULD RUN OUT BEFORE YOU GOT MONEY TO BUY MORE.: NEVER TRUE

## 2024-02-05 ASSESSMENT — PATIENT HEALTH QUESTIONNAIRE - PHQ9
SUM OF ALL RESPONSES TO PHQ9 QUESTIONS 1 & 2: 0
2. FEELING DOWN, DEPRESSED OR HOPELESS: 0
SUM OF ALL RESPONSES TO PHQ QUESTIONS 1-9: 0
1. LITTLE INTEREST OR PLEASURE IN DOING THINGS: 0
SUM OF ALL RESPONSES TO PHQ QUESTIONS 1-9: 0

## 2024-02-05 NOTE — PATIENT INSTRUCTIONS
Continue medications.    We will contact you within one week regarding your lab results.     See handout on DASH diet    Follow-up with cardiology ASAP.

## 2024-02-05 NOTE — PROGRESS NOTES
Ed:02/01 BP was high,had numbness in left arm  Chief Complaint   Patient presents with    Follow-up     Vitals:    02/05/24 1442   BP: 124/76   Pulse: 73   Resp: 18   SpO2: 98%

## 2024-02-05 NOTE — PROGRESS NOTES
[facility] Family Medicine Residency   [facility]      Chief Complaint:   Chief Complaint   Patient presents with    Follow-up       SUBJECTIVE:  Joshua Forman is a 53 y.o. male who presents for hospital follow-up.     - Admitted from 1/31/24 - 2/2/24 for hypertensive urgency secondary to medication non-compliance. Found to have elevated troponin, cardiology consulted and advised this was likely secondary to demand ischemia. Recommended outpatient w/u   - HCTZ stopped in hospital due to hypokalemia  - Medication regimen at time of hospital discharge for BP: Hydralazine 50 mg q6h, Spironolactone 50 mg qd, Losartan 100 mg qd, Clonidine 0.1 patch, Amlodipine 10 mg qd  - Also continued on Lipitor 40 mg qd and ASA 81 mg qD  - Reports tolerating medication well   - Home BP readings: 130s/70s    PMHx:  Past Medical History:   Diagnosis Date    Hypertension        Meds:   Current Outpatient Medications   Medication Sig Dispense Refill    hydrALAZINE (APRESOLINE) 50 MG tablet Take 1 tablet by mouth 4 times daily 120 tablet 0    spironolactone (ALDACTONE) 50 MG tablet Take 1 tablet by mouth daily 30 tablet 0    amLODIPine (NORVASC) 10 MG tablet Take 1 tablet by mouth daily 30 tablet 0    [START ON 2/8/2024] cloNIDine (CATAPRES) 0.1 MG/24HR PTWK Place 1 patch onto the skin every 7 days 4 patch 0    losartan (COZAAR) 100 MG tablet Take 1 tablet by mouth daily 30 tablet 0    aspirin 81 MG EC tablet Take 1 tablet by mouth daily      atorvastatin (LIPITOR) 40 MG tablet Take 1 tablet by mouth daily      fluticasone (FLONASE) 50 MCG/ACT nasal spray 2 puffs each nostril at bedtime       No current facility-administered medications for this visit.       Allergies:   No Known Allergies    Smoker:  Social History     Tobacco Use   Smoking Status Never   Smokeless Tobacco Never       ETOH:   Social History     Substance and Sexual Activity   Alcohol Use Yes       FH:   Family

## 2024-02-06 ENCOUNTER — TELEPHONE (OUTPATIENT)
Age: 54
End: 2024-02-06

## 2024-02-06 LAB
BUN SERPL-MCNC: 17 MG/DL (ref 6–24)
BUN/CREAT SERPL: 13 (ref 9–20)
CALCIUM SERPL-MCNC: 9.2 MG/DL (ref 8.7–10.2)
CHLORIDE SERPL-SCNC: 102 MMOL/L (ref 96–106)
CO2 SERPL-SCNC: 20 MMOL/L (ref 20–29)
CREAT SERPL-MCNC: 1.36 MG/DL (ref 0.76–1.27)
EGFRCR SERPLBLD CKD-EPI 2021: 63 ML/MIN/1.73
GLUCOSE SERPL-MCNC: 120 MG/DL (ref 70–99)
POTASSIUM SERPL-SCNC: 3.5 MMOL/L (ref 3.5–5.2)
SODIUM SERPL-SCNC: 142 MMOL/L (ref 134–144)

## 2024-02-06 NOTE — TELEPHONE ENCOUNTER
Care Transitions Initial Follow Up Call    Outreach made within 2 business days of discharge: Yes    Patient: Joshua Forman Patient : 1970   MRN: [id number]  Reason for Admission: There are no discharge diagnoses documented for the most recent discharge.  Discharge Date: 24       Spoke with: Unable to reach patient    Discharge department/facility: [facility]    TCM Interactive Patient Contact:  Unable to contact patient. Phone rang until busy signal took over and ended call.    Scheduled appointment with PCP within 7-14 days    Follow Up  Future Appointments   Date Time Provider Department Center   2024  9:40 AM Trang Anderson APRN - NP [facility]

## 2024-02-07 LAB
ALDOST SERPL-MCNC: 11 NG/DL (ref 0–30)
ALDOST/RENIN PLAS-RTO: 53.7 (ref 0–30)
RENIN PLAS-CCNC: 0.2 NG/ML/HR (ref 0.17–5.38)

## 2024-02-25 NOTE — PROGRESS NOTES
Office Follow-up    NAME: Joshua Forman   :  1970  MRM:  [id number]    Date:  2024  Primary Cardiologist:  Dr. Jerrica Barnett, APRN - NP         Assessment and Plan:     1.  Uncontrolled hypertension with medication non compliance: Recently seen in ED after running out of 3/4 meds. Discussed importance of med compliance. Cont Amlodipine 10 mg,Losartan 100mg daily, Aldactone 50 mg p.o. daily  Increase clonidine to 0.2 TTS patch every seventh day,  Decrease the hydralazine 50mg to bid from 4x/day due to itching  BMP today    HCTZ stopped due to hypokalemia. K 3.5 at recheck with PCP  Previous work up in - non contrast CT abdomen negative for adrenal adenoma.    LVH noted on Echos. Last echo 24 LVEF 60-65% mod LVH, mild LAE, Ascending aorta 4.0 cm  Previously referred to nephrology for further evaluation- was released from their care    2. HDL- Cont Lipitor 40mg Per PCP  Lab Results   Component Value Date    LDLCALC 115.4 (H) 2024     3.  History of snoring.  Mild CHAYO. Did not get mouthpiece.      Fu with me in 1 month for BP, 4 with Dr. 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  Although proofread, it may and can contain electronic, spelling and other errors.  Corrections may be executed at a later time.  Please feel free to contact us for any clarifications as needed.      Subjective:     Last seen by Dr. Becerril 22.  Pt admitted -24 for uncontrolled HTN CP. His troponin was elevated but flat likely due to strain from his uncontrolled HTN. He had only been complaint with 1 out of 4 BP meds. Was seen by PCP a few days after admission medication refills given and BMP rechecked.    He feels well today without cardiac complaints. Denies CP, SOB, Palpations.  ---------------  Joshua Forman, guy 53 y.o.

## 2024-02-26 ENCOUNTER — OFFICE VISIT (OUTPATIENT)
Age: 54
End: 2024-02-26
Payer: MEDICAID

## 2024-02-26 VITALS
HEIGHT: 73 IN | HEART RATE: 66 BPM | RESPIRATION RATE: 18 BRPM | OXYGEN SATURATION: 96 % | DIASTOLIC BLOOD PRESSURE: 72 MMHG | BODY MASS INDEX: 30.75 KG/M2 | WEIGHT: 232 LBS | SYSTOLIC BLOOD PRESSURE: 120 MMHG

## 2024-02-26 DIAGNOSIS — R06.83 SNORING: ICD-10-CM

## 2024-02-26 DIAGNOSIS — I10 UNCONTROLLED HYPERTENSION: ICD-10-CM

## 2024-02-26 DIAGNOSIS — I1A.0 RESISTANT HYPERTENSION: ICD-10-CM

## 2024-02-26 DIAGNOSIS — E78.2 MIXED HYPERLIPIDEMIA: ICD-10-CM

## 2024-02-26 DIAGNOSIS — Z09 HOSPITAL DISCHARGE FOLLOW-UP: ICD-10-CM

## 2024-02-26 DIAGNOSIS — I10 UNCONTROLLED HYPERTENSION: Primary | ICD-10-CM

## 2024-02-26 DIAGNOSIS — Z91.148 NON COMPLIANCE W MEDICATION REGIMEN: ICD-10-CM

## 2024-02-26 PROCEDURE — 3074F SYST BP LT 130 MM HG: CPT

## 2024-02-26 PROCEDURE — 99214 OFFICE O/P EST MOD 30 MIN: CPT

## 2024-02-26 PROCEDURE — 3078F DIAST BP <80 MM HG: CPT

## 2024-02-26 PROCEDURE — 1111F DSCHRG MED/CURRENT MED MERGE: CPT

## 2024-02-26 RX ORDER — AMLODIPINE BESYLATE 10 MG/1
10 TABLET ORAL DAILY
Qty: 90 TABLET | Refills: 3 | Status: SHIPPED | OUTPATIENT
Start: 2024-02-26

## 2024-02-26 RX ORDER — ATORVASTATIN CALCIUM 40 MG/1
40 TABLET, FILM COATED ORAL DAILY
Qty: 90 TABLET | Refills: 3 | Status: SHIPPED | OUTPATIENT
Start: 2024-02-26

## 2024-02-26 RX ORDER — CLONIDINE 0.2 MG/24H
1 PATCH, EXTENDED RELEASE TRANSDERMAL
Qty: 4 PATCH | Refills: 3 | Status: SHIPPED | OUTPATIENT
Start: 2024-02-26 | End: 2025-02-25

## 2024-02-26 RX ORDER — HYDRALAZINE HYDROCHLORIDE 50 MG/1
50 TABLET, FILM COATED ORAL 2 TIMES DAILY
Qty: 180 TABLET | Refills: 3 | Status: SHIPPED | OUTPATIENT
Start: 2024-02-26

## 2024-02-26 RX ORDER — SPIRONOLACTONE 50 MG/1
50 TABLET, FILM COATED ORAL DAILY
Qty: 90 TABLET | Refills: 3 | Status: SHIPPED | OUTPATIENT
Start: 2024-02-26

## 2024-02-26 RX ORDER — LOSARTAN POTASSIUM 100 MG/1
100 TABLET ORAL DAILY
Qty: 90 TABLET | Refills: 3 | Status: SHIPPED | OUTPATIENT
Start: 2024-02-26

## 2024-02-26 NOTE — PROGRESS NOTES
had concerns including Follow-Up from Hospital (ED 1/31-2/2/24 elevated troponin ).    Vitals:    02/26/24 1513   BP: 120/72   Site: Left Upper Arm   Position: Sitting   Pulse: 66   Resp: 18   SpO2: 96%   Weight: 105.2 kg (232 lb)   Height: 1.854 m (6' 1\")        Chest pain No    Refills No        1. Have you been to the ER, urgent care clinic since your last visit? No       Hospitalized since your last visit? No       Where?        Date?

## 2024-02-26 NOTE — PATIENT INSTRUCTIONS
Please switch you clonidine patch to the 0.2mg (new prescription)    And DECREASE your hydralazine to 50mg twice a day

## 2024-03-02 PROBLEM — R79.89 ELEVATED TROPONIN: Status: RESOLVED | Noted: 2021-10-18 | Resolved: 2024-03-02

## 2024-03-14 LAB
BUN SERPL-MCNC: 14 MG/DL (ref 6–24)
BUN/CREAT SERPL: 9 (ref 9–20)
CALCIUM SERPL-MCNC: 9.3 MG/DL (ref 8.7–10.2)
CHLORIDE SERPL-SCNC: 106 MMOL/L (ref 96–106)
CO2 SERPL-SCNC: 25 MMOL/L (ref 20–29)
CREAT SERPL-MCNC: 1.5 MG/DL (ref 0.76–1.27)
EGFRCR SERPLBLD CKD-EPI 2021: 56 ML/MIN/1.73
GLUCOSE SERPL-MCNC: 103 MG/DL (ref 70–99)
POTASSIUM SERPL-SCNC: 3.4 MMOL/L (ref 3.5–5.2)
REPORT: NORMAL
SODIUM SERPL-SCNC: 145 MMOL/L (ref 134–144)

## 2024-03-19 ENCOUNTER — TELEPHONE (OUTPATIENT)
Age: 54
End: 2024-03-19

## 2024-03-19 NOTE — TELEPHONE ENCOUNTER
Per Nancy Barnett, NP:  Please make sure he is taking the corrects meds....   \"Cont Amlodipine 10 mg,Losartan 100mg daily, Aldactone 50 mg p.o. daily   Increase clonidine to 0.2 TTS patch every seventh day,   Decrease the hydralazine 50mg to bid from 4x/day due to itching. NO HCTZ\"       If he is doing that I would like to add 10meq K daily. If not have him do the above and recheck in 2 weeks please. THANK YOU     ####################################    Unable to reach pt. Message left with HIPPA compliant message and call back number

## 2024-03-20 NOTE — TELEPHONE ENCOUNTER
Pt returned my call, per shirley instructions I went over his medications. The following was his reconciliation:     Amlodipine mg- not taking, said pharmacy did not give him that one. I confirmed an order was sent on 2/26 for 90 days at a tie with enough for a year I asked him to call the pharmacy to fill the order and start taking this Daily.    Losartan 100 mg daily- not taking stated the insurance denied this. He is going to call his insurance and check on this.    Spironolactone 50 mg daily- yes, taking    Clonidine 0.2 mg patches, every 7 days- yes, using      HCTZ 50 mg BID- Yes, taking reduced dosage.    I advised the pt I would forward the information to Nancy, and be in touch if she wants to change anything. I asked him to let me know anamaria Freire what's going on with his losartan. The pt expressed understanding and agreement. Pt has no further questions or concerns at this time.      I did not have him start K+ at this time since he did not have the full criteria Nancy was asking for.

## 2024-03-28 ENCOUNTER — TELEPHONE (OUTPATIENT)
Age: 54
End: 2024-03-28

## 2024-03-28 DIAGNOSIS — I1A.0 RESISTANT HYPERTENSION: ICD-10-CM

## 2024-03-28 NOTE — TELEPHONE ENCOUNTER
Patient is calling because he states for the past two weeks his Blood pressure numbers has been running between 170-180/100's. Patient stated he can feel when his Blood pressure is rising. Patient feels that since his medication changed he needs to speak to nurse to discuss further. Patient would like a call back.     Blood Pressure #'s:The past   145/101  189/111  174/103  Patient phone # [phone]    - Spoke with Lin, and she will be getting with Dr. Becerril tomorrow and get back with the patient with a plan of action.

## 2024-03-29 ENCOUNTER — TELEPHONE (OUTPATIENT)
Age: 54
End: 2024-03-29

## 2024-03-29 RX ORDER — HYDRALAZINE HYDROCHLORIDE 50 MG/1
TABLET, FILM COATED ORAL
Qty: 270 TABLET | Refills: 3 | Status: SHIPPED | OUTPATIENT
Start: 2024-03-29

## 2024-03-29 RX ORDER — HYDRALAZINE HYDROCHLORIDE 25 MG/1
TABLET, FILM COATED ORAL
Qty: 270 TABLET | Refills: 3 | Status: SHIPPED | OUTPATIENT
Start: 2024-03-29

## 2024-03-29 NOTE — TELEPHONE ENCOUNTER
Unable to reach pt. Message left detailed message explaining   I informed the pt of the change in Hydralazine dosage from 50 mg to 75 mg TID. With instructions on how to take the new dosage as well as to which pharmacy I sent the order to.

## 2024-03-29 NOTE — TELEPHONE ENCOUNTER
Per Dr. Brayan Starr:  Increase hydralazine to 75 mg TID      Unable to reach pt. Message left with HIPPA compliant message and call back number      Refill per VO of Dr. Brayan Starr:    Visit date not found    Future Appointments   Date Time Provider Department Center   4/4/2024  2:40 PM Nancy Barnett, MARIANNE - NP [facility]   6/26/2024  2:40 PM Brayan Starr MD [facility]       Requested Prescriptions     Signed Prescriptions Disp Refills    hydrALAZINE (APRESOLINE) 25 MG tablet 270 tablet 3     Sig: Take one tablet by mouth three times a day along with Hydralazine 50 mg tablet to total 75 mg three ties a day     Authorizing Provider: BRAYAN STARR     Ordering User: ASHLEY DAWSON    hydrALAZINE (APRESOLINE) 50 MG tablet 270 tablet 3     Sig: Take one tablet by mouth three times a day along with Hydralazine 25 mg tablet to total 75 mg three ties a day     Authorizing Provider: BRAYAN STARR     Ordering User: ASHLEY DAWSON

## 2024-04-04 ENCOUNTER — OFFICE VISIT (OUTPATIENT)
Age: 54
End: 2024-04-04
Payer: MEDICAID

## 2024-04-04 VITALS
BODY MASS INDEX: 31.81 KG/M2 | DIASTOLIC BLOOD PRESSURE: 100 MMHG | HEIGHT: 73 IN | WEIGHT: 240 LBS | OXYGEN SATURATION: 98 % | HEART RATE: 74 BPM | SYSTOLIC BLOOD PRESSURE: 170 MMHG

## 2024-04-04 DIAGNOSIS — I10 UNCONTROLLED HYPERTENSION: ICD-10-CM

## 2024-04-04 DIAGNOSIS — I1A.0 RESISTANT HYPERTENSION: ICD-10-CM

## 2024-04-04 DIAGNOSIS — R06.83 SNORING: ICD-10-CM

## 2024-04-04 DIAGNOSIS — G47.33 OSA (OBSTRUCTIVE SLEEP APNEA): ICD-10-CM

## 2024-04-04 DIAGNOSIS — E78.2 MIXED HYPERLIPIDEMIA: ICD-10-CM

## 2024-04-04 DIAGNOSIS — I10 UNCONTROLLED HYPERTENSION: Primary | ICD-10-CM

## 2024-04-04 DIAGNOSIS — Z91.148 NON COMPLIANCE W MEDICATION REGIMEN: ICD-10-CM

## 2024-04-04 PROCEDURE — 3077F SYST BP >= 140 MM HG: CPT

## 2024-04-04 PROCEDURE — 99214 OFFICE O/P EST MOD 30 MIN: CPT

## 2024-04-04 PROCEDURE — 3080F DIAST BP >= 90 MM HG: CPT

## 2024-04-04 RX ORDER — CLONIDINE 0.2 MG/24H
1 PATCH, EXTENDED RELEASE TRANSDERMAL
Qty: 13 PATCH | Refills: 3 | Status: SHIPPED | OUTPATIENT
Start: 2024-04-04 | End: 2025-04-04

## 2024-04-04 RX ORDER — AMLODIPINE BESYLATE 10 MG/1
10 TABLET ORAL DAILY
Qty: 90 TABLET | Refills: 3 | Status: SHIPPED | OUTPATIENT
Start: 2024-04-04

## 2024-04-04 NOTE — PROGRESS NOTES
Chief Complaint   Patient presents with    Hypertension     Vitals:    04/04/24 1440   BP: (!) 170/100   Site: Left Upper Arm   Position: Sitting   Pulse: 74   SpO2: 98%   Weight: 108.9 kg (240 lb)   Height: 1.854 m (6' 1\")     Chest pain: DENIED     Recent hospital stays: DENIED     Refills: CLONDINE 0.2MG   
  02/01/2024 172 <200 MG/DL Final     Triglycerides   Date Value Ref Range Status   02/01/2024 68 <150 MG/DL Final     Comment:     Based on NCEP-ATP III:  Triglycerides <150 mg/dL  is considered normal, 150-199 mg/dL  borderline high,  200-499 mg/dL high and  greater than or equal to 500 mg/dL very high.     HDL   Date Value Ref Range Status   02/01/2024 43 MG/DL Final     Comment:     Based on NCEP ATP III, HDL Cholesterol <40 mg/dL is considered low and >60 mg/dL is elevated.     LDL Calculated   Date Value Ref Range Status   02/01/2024 115.4 (H) 0 - 100 MG/DL Final     Comment:     Based on the NCEP-ATP: LDL-C concentrations are considered  optimal <100 mg/dL,  near optimal/above Normal 100-129 mg/dL  Borderline High: 130-159, High: 160-189 mg/dL  Very High: Greater than or equal to 190 mg/dL       Lab Results   Component Value Date    TSH 3.42 10/19/2021    [id number] 2.22 01/31/2024

## 2024-06-06 ENCOUNTER — OFFICE VISIT (OUTPATIENT)
Age: 54
End: 2024-06-06
Payer: MEDICAID

## 2024-06-06 VITALS
HEART RATE: 69 BPM | WEIGHT: 238.8 LBS | HEIGHT: 73 IN | RESPIRATION RATE: 18 BRPM | DIASTOLIC BLOOD PRESSURE: 71 MMHG | SYSTOLIC BLOOD PRESSURE: 137 MMHG | TEMPERATURE: 98.3 F | BODY MASS INDEX: 31.65 KG/M2 | OXYGEN SATURATION: 96 %

## 2024-06-06 DIAGNOSIS — I21.4 NSTEMI (NON-ST ELEVATED MYOCARDIAL INFARCTION) (HCC): ICD-10-CM

## 2024-06-06 DIAGNOSIS — I10 ESSENTIAL (PRIMARY) HYPERTENSION: ICD-10-CM

## 2024-06-06 DIAGNOSIS — G47.33 OSA (OBSTRUCTIVE SLEEP APNEA): ICD-10-CM

## 2024-06-06 DIAGNOSIS — R22.43 LOCALIZED SWELLING OF BOTH LOWER LEGS: Primary | ICD-10-CM

## 2024-06-06 PROCEDURE — 99213 OFFICE O/P EST LOW 20 MIN: CPT | Performed by: FAMILY MEDICINE

## 2024-06-06 PROCEDURE — 3078F DIAST BP <80 MM HG: CPT | Performed by: FAMILY MEDICINE

## 2024-06-06 PROCEDURE — 3075F SYST BP GE 130 - 139MM HG: CPT | Performed by: FAMILY MEDICINE

## 2024-06-06 ASSESSMENT — PATIENT HEALTH QUESTIONNAIRE - PHQ9
SUM OF ALL RESPONSES TO PHQ9 QUESTIONS 1 & 2: 0
SUM OF ALL RESPONSES TO PHQ QUESTIONS 1-9: 0
SUM OF ALL RESPONSES TO PHQ QUESTIONS 1-9: 0
1. LITTLE INTEREST OR PLEASURE IN DOING THINGS: NOT AT ALL
SUM OF ALL RESPONSES TO PHQ QUESTIONS 1-9: 0
SUM OF ALL RESPONSES TO PHQ QUESTIONS 1-9: 0
2. FEELING DOWN, DEPRESSED OR HOPELESS: NOT AT ALL

## 2024-06-06 NOTE — PROGRESS NOTES
Subjective:   Joshua Forman is an 54 y.o. male who presents for  bilateral leg swelling.    HPI  Chief Complaint   Patient presents with    Leg Swelling     The patient reports having bilateral lower extremity swelling for the last 2 weeks without associated pain, numbness or tingling in the feet.  He states that the symptoms start to bother him when he spent a lot of time on his feet.  He has tried to elevate the legs which did help with his symptoms a little bit.  Denies any limb pain or pain with ambulation.            Allergies - reviewed:   No Known Allergies      Medications - reviewed:  Current Outpatient Medications   Medication Sig    amLODIPine (NORVASC) 10 MG tablet Take 1 tablet by mouth daily    cloNIDine (CATAPRES-TTS-2) 0.2 MG/24HR PTWK Place 1 patch onto the skin every 7 days    hydrALAZINE (APRESOLINE) 25 MG tablet Take one tablet by mouth three times a day along with Hydralazine 50 mg tablet to total 75 mg three ties a day    hydrALAZINE (APRESOLINE) 50 MG tablet Take one tablet by mouth three times a day along with Hydralazine 25 mg tablet to total 75 mg three ties a day    losartan (COZAAR) 100 MG tablet Take 1 tablet by mouth daily    aspirin 81 MG EC tablet Take 1 tablet by mouth daily    fluticasone (FLONASE) 50 MCG/ACT nasal spray 2 puffs each nostril at bedtime    cloNIDine (CATAPRES-TTS-2) 0.2 MG/24HR PTWK Place 1 patch onto the skin every 7 days (Patient not taking: Reported on 6/6/2024)    amLODIPine (NORVASC) 10 MG tablet Take 1 tablet by mouth daily (Patient not taking: Reported on 6/6/2024)    atorvastatin (LIPITOR) 40 MG tablet Take 1 tablet by mouth daily (Patient not taking: Reported on 6/6/2024)    spironolactone (ALDACTONE) 50 MG tablet Take 1 tablet by mouth daily (Patient not taking: Reported on 6/6/2024)     No current facility-administered medications for this visit.         Past Medical History - reviewed:  Past Medical History:   Diagnosis Date    Hypertension

## 2024-06-06 NOTE — PROGRESS NOTES
Pt here today for swollen legs for about 1 wk.Identified pt with two pt identifiers(name and ). Reviewed record in preparation for visit and have obtained necessary documentation.  Chief Complaint   Patient presents with    Leg Swelling        Vitals:    24 1818   BP: 137/71   Site: Right Upper Arm   Position: Sitting   Cuff Size: Large Adult   Pulse: 69   Resp: 18   Temp: 98.3 °F (36.8 °C)   TempSrc: Oral   SpO2: 96%   Weight: 108.3 kg (238 lb 12.8 oz)   Height: 1.854 m (6' 1\")         Coordination of Care Questionnaire:  :     \"Have you been to the ER, urgent care clinic since your last visit?  Hospitalized since your last visit?\"    NO    “Have you seen or consulted any other health care providers outside of [facility] since your last visit?”    NO        “Have you had a colorectal cancer screening such as a colonoscopy/FIT/Cologuard?    NO    No colonoscopy on file  No cologuard on file  No FIT/FOBT on file   No flexible sigmoidoscopy on file         Click Here for Release of Records Request

## 2024-06-26 ENCOUNTER — OFFICE VISIT (OUTPATIENT)
Age: 54
End: 2024-06-26
Payer: MEDICAID

## 2024-06-26 VITALS
BODY MASS INDEX: 31.41 KG/M2 | OXYGEN SATURATION: 98 % | WEIGHT: 237 LBS | SYSTOLIC BLOOD PRESSURE: 138 MMHG | HEART RATE: 68 BPM | DIASTOLIC BLOOD PRESSURE: 76 MMHG | RESPIRATION RATE: 18 BRPM | HEIGHT: 73 IN

## 2024-06-26 DIAGNOSIS — I10 UNCONTROLLED HYPERTENSION: ICD-10-CM

## 2024-06-26 DIAGNOSIS — I1A.0 RESISTANT HYPERTENSION: ICD-10-CM

## 2024-06-26 DIAGNOSIS — I10 UNCONTROLLED HYPERTENSION: Primary | ICD-10-CM

## 2024-06-26 DIAGNOSIS — E78.2 MIXED HYPERLIPIDEMIA: ICD-10-CM

## 2024-06-26 DIAGNOSIS — G47.33 OSA (OBSTRUCTIVE SLEEP APNEA): ICD-10-CM

## 2024-06-26 PROCEDURE — 3075F SYST BP GE 130 - 139MM HG: CPT | Performed by: INTERNAL MEDICINE

## 2024-06-26 PROCEDURE — 3078F DIAST BP <80 MM HG: CPT | Performed by: INTERNAL MEDICINE

## 2024-06-26 PROCEDURE — 99214 OFFICE O/P EST MOD 30 MIN: CPT | Performed by: INTERNAL MEDICINE

## 2024-06-26 RX ORDER — HYDRALAZINE HYDROCHLORIDE 25 MG/1
TABLET, FILM COATED ORAL
Qty: 270 TABLET | Refills: 3 | Status: SHIPPED | OUTPATIENT
Start: 2024-06-26

## 2024-06-26 RX ORDER — HYDRALAZINE HYDROCHLORIDE 50 MG/1
TABLET, FILM COATED ORAL
Qty: 270 TABLET | Refills: 3 | Status: SHIPPED | OUTPATIENT
Start: 2024-06-26

## 2024-06-26 RX ORDER — SPIRONOLACTONE 50 MG/1
50 TABLET, FILM COATED ORAL DAILY
Qty: 90 TABLET | Refills: 3 | Status: SHIPPED | OUTPATIENT
Start: 2024-06-26

## 2024-06-26 RX ORDER — AMLODIPINE BESYLATE 10 MG/1
10 TABLET ORAL DAILY
Qty: 90 TABLET | Refills: 3 | Status: SHIPPED | OUTPATIENT
Start: 2024-06-26

## 2024-06-26 RX ORDER — LOSARTAN POTASSIUM 100 MG/1
100 TABLET ORAL DAILY
Qty: 90 TABLET | Refills: 3 | Status: SHIPPED | OUTPATIENT
Start: 2024-06-26

## 2024-06-26 NOTE — PROGRESS NOTES
Received VO from Dr. Mikey Becerril:    Refill all BP meds.      Check CMP today.    ORDERS GIVEN TO THE PT  See Nancy Barnett NP in 4 months.

## 2024-06-26 NOTE — PROGRESS NOTES
had concerns including Hypertension and Sleep Apnea.    Vitals:    06/26/24 1435 06/26/24 1447   BP: (!) 150/82 138/76   Site: Left Upper Arm Right Upper Arm   Position: Sitting Sitting   Pulse: 68    Resp: 18    SpO2: 98%    Weight: 107.5 kg (237 lb)    Height: 1.854 m (6' 1\")       Slight swelling lower legs   Chest pain 2 days ago dull chest pain    Refills requested        1. Have you been to the ER, urgent care clinic since your last visit? No       Hospitalized since your last visit? No       Where?        Date?~

## 2024-06-26 NOTE — PROGRESS NOTES
Office Follow-up    NAME: Joshua Forman   :  1970  MRM:  [id number]    Date:  2024  Primary Cardiologist:  Dr. Jerrica STARR MD         Assessment and Plan:     1.  Uncontrolled hypertension with medication non compliance:     Cont Losartan 100mg daily, Aldactone 50 mg p.o. daily, hydralazine 75mg to TID  Cont clonidine to 0.2 TTS patch every seventh day (may need to increase to 0.3)  - BP now normal since taking all meds regularly including Amlodipine.   -BMP today to check K on losartan/spironolactone. Prior K was 3.4 and Cr 1.5 in 2024    Previous work up in - non contrast CT abdomen negative for adrenal adenoma.    LVH noted on Echos. Last echo 24 LVEF 60-65% mod LVH, mild LAE, Ascending aorta 4.0 cm  Previously referred to nephrology for further evaluation- was released from their care    Lab Results   Component Value Date    K 3.4 (L) 2024     Lab Results   Component Value Date    CREATININE 1.50 (H) 2024       2. HDL- Cont Lipitor 40mg Per PCP  No results found for: \"LDLDIRECT\"    3.  History of snoring.  Mild CHAYO. Did not get mouthpiece.      Fu with Nancy Barnett NP in 4 months.                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  Although proofread, it may and can contain electronic, spelling and other errors.  Corrections may be executed at a later time.  Please feel free to contact us for any clarifications as needed.      Subjective:     Feeling well, however BP still elevated as pharmacy has not given him his amlodipine. He is going abck to the gym, boxing. Denies CP, SOB, HA.  ---------  Last seen by Dr. Starr 22.  Pt admitted -24 for uncontrolled HTN CP. His troponin was elevated but flat likely due to strain from his uncontrolled HTN. He had only been complaint with 1 out of 4 BP

## 2024-06-26 NOTE — ADDENDUM NOTE
Addended by: ASHLEY DAWSON on: 6/26/2024 03:08 PM     Modules accepted: Orders     Pt extubated to NC this am. Lungs clear. Oxycodone and tylenol for pain with good relief. Up to chair x 2 - assist of 1. Pt remains in SR HR 60-70's. CT output moderate. Pt transferring to  this evening. Family at bedside - updated on plan of care. Cont to monitor.

## 2024-07-02 LAB
ALBUMIN SERPL-MCNC: 4.2 G/DL (ref 3.8–4.9)
ALP SERPL-CCNC: 67 IU/L (ref 44–121)
ALT SERPL-CCNC: 24 IU/L (ref 0–44)
AST SERPL-CCNC: 31 IU/L (ref 0–40)
BILIRUB SERPL-MCNC: 0.4 MG/DL (ref 0–1.2)
BUN SERPL-MCNC: 15 MG/DL (ref 6–24)
BUN/CREAT SERPL: 11 (ref 9–20)
CALCIUM SERPL-MCNC: 9.1 MG/DL (ref 8.7–10.2)
CHLORIDE SERPL-SCNC: 102 MMOL/L (ref 96–106)
CO2 SERPL-SCNC: 29 MMOL/L (ref 20–29)
CREAT SERPL-MCNC: 1.36 MG/DL (ref 0.76–1.27)
EGFRCR SERPLBLD CKD-EPI 2021: 63 ML/MIN/1.73
GLOBULIN SER CALC-MCNC: 2.5 G/DL (ref 1.5–4.5)
GLUCOSE SERPL-MCNC: 90 MG/DL (ref 70–99)
POTASSIUM SERPL-SCNC: 3 MMOL/L (ref 3.5–5.2)
PROT SERPL-MCNC: 6.7 G/DL (ref 6–8.5)
SODIUM SERPL-SCNC: 144 MMOL/L (ref 134–144)

## 2024-07-15 ENCOUNTER — TELEPHONE (OUTPATIENT)
Age: 54
End: 2024-07-15

## 2024-07-15 RX ORDER — POTASSIUM CHLORIDE 20 MEQ/1
20 TABLET, EXTENDED RELEASE ORAL DAILY
Qty: 90 TABLET | Refills: 1 | Status: SHIPPED | OUTPATIENT
Start: 2024-07-15

## 2024-07-15 NOTE — TELEPHONE ENCOUNTER
Received VO from Dr. Mikey Becerril:    Cr 1.3, K 3.0.   Replace K with 20mg daily.   ##################################    Unable to reach pt. Message left with HIPPA compliant message and call back number    ##################################    Spoke with the pt, identified the pt with name and .  Informed him of the above results and Dr. Becerril's recommendations. Then verified his pharmacy. The pt expressed understanding and agreement. Pt has no further questions or concerns at this time.    Refill per VO of Dr. Mikey Becerril:    2024    Future Appointments   Date Time Provider Department Center   10/29/2024  3:20 PM Nancy Barnett, APRN - NP [facility]       Requested Prescriptions     Pending Prescriptions Disp Refills    potassium chloride (KLOR-CON M) 20 MEQ extended release tablet 90 tablet 1     Sig: Take 1 tablet by mouth daily

## 2024-10-28 NOTE — PROGRESS NOTES
Office Follow-up    NAME: Joshua Forman   :  1970  MRM:  [id number]    Date:  10/29/2024  Primary Cardiologist:  Dr. Jerrica Barnett, APRN - NP         Assessment and Plan:     1.  Uncontrolled hypertension with medication non compliance:   Cont Losartan 100mg daily, amlodipine 10mg, hydralazine 75mg to TID  -Off clonidine to 0.2 TTS patch and spironolactone due to side effects?  - BP now normal since taking all meds regularly   -BMP today to check K on losartan/potassium 20meq daily.     Previous work up in - non contrast CT abdomen negative for adrenal adenoma.    LVH noted on Echos. Last echo 24 LVEF 60-65% mod LVH, mild LAE, Ascending aorta 4.0 cm  -Repeat echo with next visit  Previously referred to nephrology for further evaluation- was released from their care    Lab Results   Component Value Date    K 3.0 (L) 2024     Lab Results   Component Value Date    CREATININE 1.36 (H) 2024     2. HDL- Per PCP, now off statin  Lab Results   Component Value Date    .4 (H) 2024      3.  History of snoring.  Mild CHAYO. Did not get mouthpiece.      Fu with Dr. 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  Although proofread, it may and can contain electronic, spelling and other errors.  Corrections may be executed at a later time.  Please feel free to contact us for any clarifications as needed.      Subjective:     Feeling well, BP now under control with med compliance. He is going abck to the gym, boxing. Denies CP, SOB, HA.  ---------  Last seen by Dr. Becerril 22.  Pt admitted -24 for uncontrolled HTN CP. His troponin was elevated but flat likely due to strain from his uncontrolled HTN. He had only been complaint with 1 out of 4 BP meds. Was seen by PCP a few days after

## 2024-10-29 ENCOUNTER — OFFICE VISIT (OUTPATIENT)
Age: 54
End: 2024-10-29
Payer: MEDICAID

## 2024-10-29 VITALS
HEIGHT: 73 IN | OXYGEN SATURATION: 98 % | SYSTOLIC BLOOD PRESSURE: 136 MMHG | BODY MASS INDEX: 30.75 KG/M2 | DIASTOLIC BLOOD PRESSURE: 78 MMHG | WEIGHT: 232 LBS | HEART RATE: 71 BPM

## 2024-10-29 DIAGNOSIS — E87.6 HYPOKALEMIA: ICD-10-CM

## 2024-10-29 DIAGNOSIS — R06.83 SNORING: ICD-10-CM

## 2024-10-29 DIAGNOSIS — Z91.148 NON COMPLIANCE W MEDICATION REGIMEN: ICD-10-CM

## 2024-10-29 DIAGNOSIS — I10 UNCONTROLLED HYPERTENSION: Primary | ICD-10-CM

## 2024-10-29 DIAGNOSIS — E78.2 MIXED HYPERLIPIDEMIA: ICD-10-CM

## 2024-10-29 DIAGNOSIS — I77.810 DILATED AORTIC ROOT (HCC): ICD-10-CM

## 2024-10-29 PROCEDURE — 3075F SYST BP GE 130 - 139MM HG: CPT

## 2024-10-29 PROCEDURE — 3078F DIAST BP <80 MM HG: CPT

## 2024-10-29 PROCEDURE — 99214 OFFICE O/P EST MOD 30 MIN: CPT

## 2024-10-29 NOTE — PROGRESS NOTES
Chief Complaint   Patient presents with    Hypertension    Cholesterol Problem    Sleep Apnea     Vitals:    10/29/24 1519   BP: 136/78   Site: Left Upper Arm   Position: Sitting   Pulse: 71   SpO2: 98%   Weight: 105.2 kg (232 lb)   Height: 1.854 m (6' 1\")       Chest pain NO     ER, urgent care, or hospitalized outside of [facility] since your last visit?  NO     Refills NO

## 2024-11-08 ENCOUNTER — OFFICE VISIT (OUTPATIENT)
Age: 54
End: 2024-11-08
Payer: MEDICAID

## 2024-11-08 VITALS
HEIGHT: 73 IN | SYSTOLIC BLOOD PRESSURE: 143 MMHG | OXYGEN SATURATION: 96 % | DIASTOLIC BLOOD PRESSURE: 73 MMHG | BODY MASS INDEX: 31.23 KG/M2 | RESPIRATION RATE: 14 BRPM | WEIGHT: 235.6 LBS | HEART RATE: 63 BPM | TEMPERATURE: 99.2 F

## 2024-11-08 DIAGNOSIS — E87.6 HYPOKALEMIA: ICD-10-CM

## 2024-11-08 DIAGNOSIS — I10 ESSENTIAL HYPERTENSION: ICD-10-CM

## 2024-11-08 DIAGNOSIS — Z00.00 VISIT FOR WELL MAN HEALTH CHECK: Primary | ICD-10-CM

## 2024-11-08 PROBLEM — I16.1 HYPERTENSIVE EMERGENCY: Status: RESOLVED | Noted: 2024-02-01 | Resolved: 2024-11-08

## 2024-11-08 PROBLEM — I16.0 HYPERTENSIVE URGENCY: Status: RESOLVED | Noted: 2024-02-01 | Resolved: 2024-11-08

## 2024-11-08 PROCEDURE — 99396 PREV VISIT EST AGE 40-64: CPT

## 2024-11-08 RX ORDER — ZOSTER VACCINE RECOMBINANT, ADJUVANTED 50 MCG/0.5
0.5 KIT INTRAMUSCULAR SEE ADMIN INSTRUCTIONS
Qty: 0.5 ML | Refills: 0 | Status: SHIPPED | OUTPATIENT
Start: 2024-11-08 | End: 2025-05-07

## 2024-11-08 ASSESSMENT — ENCOUNTER SYMPTOMS
CHEST TIGHTNESS: 0
SHORTNESS OF BREATH: 0

## 2024-11-08 NOTE — PROGRESS NOTES
Joshua Forman is a 54 y.o. male    No chief complaint on file.      \"Have you been to the ER, urgent care clinic since your last visit?  Hospitalized since your last visit?\"    NO    “Have you seen or consulted any other health care providers outside of [facility] since your last visit?”    NO    “Have you had a colorectal cancer screening such as a colonoscopy/FIT/Cologuard?    NO    No colonoscopy on file  No cologuard on file  No FIT/FOBT on file   No flexible sigmoidoscopy on file               Vitals:    11/08/24 1459   Resp: 14          No data to display              Health Maintenance Due   Topic Date Due    Hepatitis C screen  Never done    Hepatitis B vaccine (1 of 3 - 19+ 3-dose series) Never done    Colorectal Cancer Screen  Never done    Shingles vaccine (1 of 2) Never done    Flu vaccine (1) Never done    COVID-19 Vaccine (1 - 2023-24 season) Never done       
today.      1. Visit for well man health check  Discussed safety and routine health maintenance relevant for age, information provided. Discussed importance of balanced diet as well as incorporating exercise three times per week.   Declines flu vaccine  - ERLIN - Lawrence Chase MD, Gastroenterology, [place]  - zoster recombinant adjuvanted vaccine (SHINGRIX) 50 MCG/0.5ML SUSR injection; Inject 0.5 mLs into the muscle See Admin Instructions 1 dose now and repeat in 2-6 months  Dispense: 0.5 mL; Refill: 0    2. Essential hypertension  Resistant, currently followed by cardiology.  Eval by nephrology for secondary hypertension negative.  Continued on losartan 100, hydralazine, amlodipine 10.   Complicated by hypokalemia as below.  Has next appointment set up.    3. Hypokalemia  Chronic, last K of 3.0.  Followed by cardiology, recently started on K supplement.  Has next appointment and follow-up blood work set.      Return in about 6 months (around 5/8/2025) for Chronic Conditions.       Pt was discussed seen with   (attending physician).    I have reviewed patient medical and social history and medications.  I have reviewed pertinent labs results and other data. I have discussed the diagnosis with the patient and the intended plan as seen in the above orders. The patient has received an after-visit summary and questions were answered concerning future plans. I have discussed medication side effects and warnings with the patient as well.    Gildardo Flores MD  Resident [facility]

## 2024-11-13 ENCOUNTER — TELEPHONE (OUTPATIENT)
Age: 54
End: 2024-11-13

## 2024-11-13 DIAGNOSIS — E87.6 HYPOKALEMIA: Primary | ICD-10-CM

## 2024-11-13 DIAGNOSIS — E87.6 HYPOKALEMIA: ICD-10-CM

## 2024-11-13 LAB
BUN SERPL-MCNC: 11 MG/DL (ref 6–24)
BUN/CREAT SERPL: 10 (ref 9–20)
CALCIUM SERPL-MCNC: 9 MG/DL (ref 8.7–10.2)
CHLORIDE SERPL-SCNC: 101 MMOL/L (ref 96–106)
CO2 SERPL-SCNC: 27 MMOL/L (ref 20–29)
CREAT SERPL-MCNC: 1.15 MG/DL (ref 0.76–1.27)
EGFRCR SERPLBLD CKD-EPI 2021: 77 ML/MIN/1.73
GLUCOSE SERPL-MCNC: 144 MG/DL (ref 70–99)
POTASSIUM SERPL-SCNC: 3 MMOL/L (ref 3.5–5.2)
SODIUM SERPL-SCNC: 144 MMOL/L (ref 134–144)

## 2024-11-13 RX ORDER — SPIRONOLACTONE 25 MG/1
25 TABLET ORAL DAILY
Qty: 90 TABLET | Refills: 3 | Status: SHIPPED | OUTPATIENT
Start: 2024-11-13

## 2024-11-13 NOTE — TELEPHONE ENCOUNTER
Received VO from CHESTER Kirk:    His potassium remains 3.0  Please make sure he is taking his 20meq K daily.  He needs to add spironolactone 25mg daily and take 40meq BID for 5 days, then return to 20meq daily    If he is not- emphasize med compliance and still above plan     Also BMP in 2 weeks     ###################################    Spoke with the pt, identified the pt with name and . Informed the pt of the above. He did verify he is taking the K+ 20 mEq everyday.. I informed him of the new recommendation and the follow up labs. Pt verbalized understanding, no further questions at this time.

## 2024-12-02 ENCOUNTER — TELEPHONE (OUTPATIENT)
Age: 54
End: 2024-12-02

## 2024-12-02 NOTE — TELEPHONE ENCOUNTER
Called patient to make sure he gets his labs done.  Left a message for the patient to call if he had any questions.      ----- Message from MARIANNE Kirk NP sent at 12/2/2024  8:23 AM EST -----  Can you call and make sure he gets his lab please. Thanks!!@  ----- Message -----  From: Nancy Barnett APRN - NP  Sent: 11/13/2024   8:57 AM EST  To: MARIANNE Rothman NP; #    His potassium remains 3.0  Please make sure he is taking his 20meq K daily.   He needs to add spironolactone 25mg daily and take 40meq BID for 5 days, then return to 20meq daily    If he is not- emphasize med compliance and still above plan  ----- Message -----  From: Gracie Stock Incoming Amb Ref Lab Orders To [facility]  Sent: 11/13/2024   5:36 AM EST  To: MARIANNE Rothman NP

## 2024-12-10 DIAGNOSIS — E87.6 HYPOKALEMIA: Primary | ICD-10-CM

## 2024-12-10 DIAGNOSIS — E87.6 HYPOKALEMIA: ICD-10-CM

## 2024-12-11 ENCOUNTER — TELEPHONE (OUTPATIENT)
Age: 54
End: 2024-12-11

## 2024-12-11 NOTE — TELEPHONE ENCOUNTER
Called and left a message for the patient to get his labs done that where order by Nancy Barnett NP.  I told patient to call if he has any questions.

## 2024-12-19 ENCOUNTER — CLINICAL DOCUMENTATION (OUTPATIENT)
Age: 54
End: 2024-12-19

## 2024-12-19 DIAGNOSIS — E87.6 HYPOKALEMIA: Primary | ICD-10-CM

## 2024-12-19 DIAGNOSIS — E87.6 HYPOKALEMIA: ICD-10-CM

## 2025-01-08 ENCOUNTER — OFFICE VISIT (OUTPATIENT)
Age: 55
End: 2025-01-08
Payer: MEDICAID

## 2025-01-08 VITALS
HEIGHT: 73 IN | WEIGHT: 235 LBS | SYSTOLIC BLOOD PRESSURE: 140 MMHG | BODY MASS INDEX: 31.14 KG/M2 | OXYGEN SATURATION: 98 % | DIASTOLIC BLOOD PRESSURE: 82 MMHG | HEART RATE: 66 BPM

## 2025-01-08 DIAGNOSIS — I77.810 DILATED AORTIC ROOT (HCC): ICD-10-CM

## 2025-01-08 DIAGNOSIS — I10 UNCONTROLLED HYPERTENSION: Primary | ICD-10-CM

## 2025-01-08 DIAGNOSIS — E78.2 MIXED HYPERLIPIDEMIA: ICD-10-CM

## 2025-01-08 PROCEDURE — 3077F SYST BP >= 140 MM HG: CPT | Performed by: INTERNAL MEDICINE

## 2025-01-08 PROCEDURE — 3079F DIAST BP 80-89 MM HG: CPT | Performed by: INTERNAL MEDICINE

## 2025-01-08 PROCEDURE — 99214 OFFICE O/P EST MOD 30 MIN: CPT | Performed by: INTERNAL MEDICINE

## 2025-01-08 NOTE — PROGRESS NOTES
had concerns including New Patient.    Vitals:    01/08/25 1357 01/08/25 1427   BP: (!) 142/90 (!) 140/82   Site: Left Upper Arm Right Upper Arm   Position: Sitting Sitting   Pulse: 66    SpO2: 98%    Weight: 106.6 kg (235 lb)    Height: 1.854 m (6' 1\")         Chest pain No    Refills No    Patient states being short of breath    1. Have you been to the ER, urgent care clinic since your last visit? No       Hospitalized since your last visit? No       Where?        Date?  
KAMILLA received from Dr. MORA Becerril MD:  See Nancy Barnett NP in 6 months with same day Echo for ascending aorta dilation.     
without cardiac complaints. Denies CP, SOB, Palpations.  ---------------  Joshua Dasia, guy 54 y.o. who presents for followup of HTN. No chest pain. No SOB.     Exam:     Physical Exam:  Visit Vitals  BP (!) 140/82 (Site: Right Upper Arm, Position: Sitting)   Pulse 66   Ht 1.854 m (6' 1\")   Wt 106.6 kg (235 lb)   SpO2 98%   BMI 31.00 kg/m²        General appearance - alert, well appearing, and in no distress  Mental status - affect appropriate to mood  Eyes - sclera anicteric, moist mucous membranes  Neck - supple, no significant adenopathy  Chest - clear to auscultation, no wheezes, rales or rhonchi  Heart - normal rate, regular rhythm, normal S1, S2, no murmurs, rubs, clicks or gallops  Abdomen - soft, nontender, nondistended, no masses or organomegaly  Extremities - peripheral pulses normal, no pedal edema  Skin - normal coloration  no rashes    Medications:     Current Outpatient Medications   Medication Sig Dispense Refill    spironolactone (ALDACTONE) 25 MG tablet Take 1 tablet by mouth daily 90 tablet 3    potassium chloride (KLOR-CON M) 20 MEQ extended release tablet Take 1 tablet by mouth daily 90 tablet 1    amLODIPine (NORVASC) 10 MG tablet Take 1 tablet by mouth daily 90 tablet 3    losartan (COZAAR) 100 MG tablet Take 1 tablet by mouth daily 90 tablet 3    hydrALAZINE (APRESOLINE) 25 MG tablet Take one tablet by mouth three times a day along with Hydralazine 50 mg tablet to total 75 mg three ties a day 270 tablet 3    hydrALAZINE (APRESOLINE) 50 MG tablet Take one tablet by mouth three times a day along with Hydralazine 25 mg tablet to total 75 mg three ties a day 270 tablet 3    aspirin 81 MG EC tablet Take 1 tablet by mouth daily      fluticasone (FLONASE) 50 MCG/ACT nasal spray 2 puffs each nostril at bedtime      zoster recombinant adjuvanted vaccine (SHINGRIX) 50 MCG/0.5ML SUSR injection Inject 0.5 mLs into the muscle See Admin Instructions 1 dose now and repeat in 2-6 months (Patient not

## 2025-01-09 LAB
ALBUMIN SERPL-MCNC: 4.6 G/DL (ref 3.8–4.9)
ALP SERPL-CCNC: 75 IU/L (ref 44–121)
ALT SERPL-CCNC: 20 IU/L (ref 0–44)
AST SERPL-CCNC: 21 IU/L (ref 0–40)
BILIRUB SERPL-MCNC: 0.5 MG/DL (ref 0–1.2)
BUN SERPL-MCNC: 13 MG/DL (ref 6–24)
BUN/CREAT SERPL: 10 (ref 9–20)
CALCIUM SERPL-MCNC: 9.2 MG/DL (ref 8.7–10.2)
CHLORIDE SERPL-SCNC: 102 MMOL/L (ref 96–106)
CO2 SERPL-SCNC: 25 MMOL/L (ref 20–29)
CREAT SERPL-MCNC: 1.34 MG/DL (ref 0.76–1.27)
EGFRCR SERPLBLD CKD-EPI 2021: 64 ML/MIN/1.73
GLOBULIN SER CALC-MCNC: 2.4 G/DL (ref 1.5–4.5)
GLUCOSE SERPL-MCNC: 158 MG/DL (ref 70–99)
POTASSIUM SERPL-SCNC: 3.3 MMOL/L (ref 3.5–5.2)
PROT SERPL-MCNC: 7 G/DL (ref 6–8.5)
SODIUM SERPL-SCNC: 143 MMOL/L (ref 134–144)

## 2025-01-15 ENCOUNTER — TELEPHONE (OUTPATIENT)
Age: 55
End: 2025-01-15

## 2025-01-15 NOTE — TELEPHONE ENCOUNTER
Received VO from Nancy Barnett, ANP:    Is he taking the spironolactone and the potassium?     ####################################    Unable to reach the pt, VMB full

## 2025-01-16 ENCOUNTER — TELEPHONE (OUTPATIENT)
Age: 55
End: 2025-01-16

## 2025-01-16 NOTE — TELEPHONE ENCOUNTER
----- Message from DEVAN LUCAS RN sent at 1/15/2025  4:54 PM EST -----  Can yo try calling him to ask? I haven't been able to reach him  Thanks  ----- Message -----  From: Nancy Barnett APRN - NP  Sent: 1/9/2025   9:14 AM EST  To: Mikey Becerril MD; Lin Khan RN    Is he taking the spironolactone and the potassium?  -A  ----- Message -----  From: Gracie Stock Incoming Amb Ref Lab Orders To [facility]  Sent: 1/9/2025   5:36 AM EST  To: MARIANNE Rothman NP

## 2025-01-16 NOTE — TELEPHONE ENCOUNTER
I called patient and confirmed with him that he is taking both Spironolactone and Potassium.  Patient verified with 2x identifier

## 2025-01-21 ENCOUNTER — TELEPHONE (OUTPATIENT)
Age: 55
End: 2025-01-21

## 2025-01-21 DIAGNOSIS — Z79.899 MEDICATION MANAGEMENT: Primary | ICD-10-CM

## 2025-01-21 NOTE — TELEPHONE ENCOUNTER
Received VO from CHESTER Kirk:    He is supposedly taking Potassium 20meq daily. Needs to increase to 20meq BID. Also continue Spironolactone 25mg (he can't stop due to low potassium unless he is having side effects.) BMP before his next visit with Jerrica     #################################    Unable to reach pt. Message left with HIPPA compliant message and call back number

## 2025-01-22 RX ORDER — POTASSIUM CHLORIDE 1500 MG/1
20 TABLET, EXTENDED RELEASE ORAL 2 TIMES DAILY
Qty: 180 TABLET | Refills: 1 | Status: SHIPPED | OUTPATIENT
Start: 2025-01-22

## 2025-01-22 NOTE — TELEPHONE ENCOUNTER
Spoke with the pt, identified the pt with name and .  Informed him of the increase in K+, to remain on Spirolactone d/t  low K+, and to have las done the last week of April.  I gave Him time for questions, to which I answered to the best of my ability.  the pt, expressed understanding and agreement. Pt has no further questions or concerns at this time.   no

## 2025-02-25 ENCOUNTER — OFFICE VISIT (OUTPATIENT)
Age: 55
End: 2025-02-25
Payer: MEDICAID

## 2025-02-25 VITALS
DIASTOLIC BLOOD PRESSURE: 92 MMHG | WEIGHT: 230 LBS | HEART RATE: 75 BPM | SYSTOLIC BLOOD PRESSURE: 168 MMHG | TEMPERATURE: 98.1 F | BODY MASS INDEX: 30.34 KG/M2 | OXYGEN SATURATION: 99 %
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
## 2025-02-25 DIAGNOSIS — M54.50 ACUTE BILATERAL LOW BACK PAIN WITHOUT SCIATICA: ICD-10-CM

## 2025-02-25 PROCEDURE — 99213 OFFICE O/P EST LOW 20 MIN: CPT

## 2025-02-25 RX ORDER — LIDOCAINE 4 G/G
1 PATCH TOPICAL DAILY
Qty: 30 PATCH | Refills: 0 | Status: SHIPPED | OUTPATIENT
Start: 2025-02-25 | End: 2025-03-27

## 2025-02-25 RX ORDER — OXYCODONE HYDROCHLORIDE 5 MG/1
5 TABLET ORAL EVERY 8 HOURS PRN
Qty: 9 TABLET | Refills: 0 | Status: SHIPPED | OUTPATIENT
Start: 2025-02-25 | End: 2025-02-28

## 2025-02-25 ASSESSMENT — PATIENT HEALTH QUESTIONNAIRE - PHQ9
SUM OF ALL RESPONSES TO PHQ QUESTIONS 1-9: 0
1. LITTLE INTEREST OR PLEASURE IN DOING THINGS: NOT AT ALL
SUM OF ALL RESPONSES TO PHQ9 QUESTIONS 1 & 2: 0
2. FEELING DOWN, DEPRESSED OR HOPELESS: NOT AT ALL
SUM OF ALL RESPONSES TO PHQ QUESTIONS 1-9: 0

## 2025-02-25 NOTE — PATIENT INSTRUCTIONS
I recommend you take ibuprofen 400-600mg every 8 hours as needed. You can also take 650mg as needed every 8 hours as well. I recommend starting this

## 2025-03-16 ENCOUNTER — APPOINTMENT (OUTPATIENT)
Facility: HOSPITAL | Age: 55
End: 2025-03-16
Payer: MEDICAID

## 2025-03-16 ENCOUNTER — HOSPITAL ENCOUNTER (EMERGENCY)
Facility: HOSPITAL | Age: 55
Discharge: HOME | End: 2025-03-19
Payer: MEDICAID

## 2025-03-16 ENCOUNTER — HOSPITAL ENCOUNTER (EMERGENCY)
Facility: HOSPITAL | Age: 55
Discharge: HOME | End: 2025-03-16
Attending: EMERGENCY MEDICINE
Payer: MEDICAID

## 2025-03-16 VITALS
OXYGEN SATURATION: 100 % | HEIGHT: 73 IN | BODY MASS INDEX: 30.48 KG/M2 | DIASTOLIC BLOOD PRESSURE: 79 MMHG | SYSTOLIC BLOOD PRESSURE: 131 MMHG | HEART RATE: 84 BPM | WEIGHT: 230 LBS | TEMPERATURE: 98.6 F | RESPIRATION RATE: 16 BRPM
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
## 2025-03-16 DIAGNOSIS — M79.10 MUSCULAR PAIN: ICD-10-CM

## 2025-03-16 PROCEDURE — 71046 X-RAY EXAM CHEST 2 VIEWS: CPT

## 2025-03-16 PROCEDURE — 72100 X-RAY EXAM L-S SPINE 2/3 VWS: CPT

## 2025-03-16 PROCEDURE — 99284 EMERGENCY DEPT VISIT MOD MDM: CPT

## 2025-03-16 PROCEDURE — 72125 CT NECK SPINE W/O DYE: CPT

## 2025-03-16 PROCEDURE — 70450 CT HEAD/BRAIN W/O DYE: CPT

## 2025-03-16 RX ORDER — CYCLOBENZAPRINE HCL 10 MG
10 TABLET ORAL 3 TIMES DAILY PRN
Qty: 15 TABLET | Refills: 0 | Status: SHIPPED | OUTPATIENT
Start: 2025-03-16 | End: 2025-03-26

## 2025-03-16 ASSESSMENT — PAIN DESCRIPTION - LOCATION: LOCATION: NECK

## 2025-03-16 ASSESSMENT — PAIN - FUNCTIONAL ASSESSMENT
PAIN_FUNCTIONAL_ASSESSMENT: 0-10
PAIN_FUNCTIONAL_ASSESSMENT: 0-10
PAIN_FUNCTIONAL_ASSESSMENT: ACTIVITIES ARE NOT PREVENTED

## 2025-03-16 ASSESSMENT — PAIN DESCRIPTION - PAIN TYPE: TYPE: ACUTE PAIN

## 2025-03-16 ASSESSMENT — PAIN SCALES - GENERAL: PAINLEVEL_OUTOF10: 8

## 2025-03-16 ASSESSMENT — PAIN DESCRIPTION - DESCRIPTORS: DESCRIPTORS: DISCOMFORT;SHARP
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
## 2025-03-26 ENCOUNTER — HOSPITAL ENCOUNTER (OUTPATIENT)
Facility: HOSPITAL | Age: 55
Discharge: HOME | End: 2025-03-29
Payer: MEDICAID

## 2025-03-26 DIAGNOSIS — M67.911 DISORDER OF RIGHT ROTATOR CUFF: ICD-10-CM

## 2025-03-26 PROCEDURE — 73221 MRI JOINT UPR EXTREM W/O DYE: CPT

## 2025-06-06 ENCOUNTER — OFFICE VISIT (OUTPATIENT)
Age: 55
End: 2025-06-06
Payer: MEDICAID

## 2025-06-06 VITALS
OXYGEN SATURATION: 98 % | SYSTOLIC BLOOD PRESSURE: 126 MMHG | HEIGHT: 73 IN | BODY MASS INDEX: 30.22 KG/M2 | HEART RATE: 68 BPM | WEIGHT: 228 LBS | DIASTOLIC BLOOD PRESSURE: 74 MMHG

## 2025-06-06 DIAGNOSIS — I10 UNCONTROLLED HYPERTENSION: Primary | ICD-10-CM

## 2025-06-06 DIAGNOSIS — Z79.899 MEDICATION MANAGEMENT: ICD-10-CM

## 2025-06-06 DIAGNOSIS — E87.6 HYPOKALEMIA: ICD-10-CM

## 2025-06-06 DIAGNOSIS — I77.810 DILATED AORTIC ROOT: ICD-10-CM

## 2025-06-06 PROCEDURE — 3074F SYST BP LT 130 MM HG: CPT | Performed by: INTERNAL MEDICINE

## 2025-06-06 PROCEDURE — 93010 ELECTROCARDIOGRAM REPORT: CPT | Performed by: INTERNAL MEDICINE

## 2025-06-06 PROCEDURE — 3078F DIAST BP <80 MM HG: CPT | Performed by: INTERNAL MEDICINE

## 2025-06-06 PROCEDURE — 99214 OFFICE O/P EST MOD 30 MIN: CPT | Performed by: INTERNAL MEDICINE

## 2025-06-06 PROCEDURE — 93005 ELECTROCARDIOGRAM TRACING: CPT | Performed by: INTERNAL MEDICINE

## 2025-06-06 NOTE — PROGRESS NOTES
ROGERS. received from Dr. MORA Becerril MD:    Check CMP and FLP.    Lab orders and lab locations given to the pt    See Nancy Barnett NP in 6 months

## 2025-06-06 NOTE — PROGRESS NOTES
Office Follow-up    NAME: Joshua Forman   :  1970  MRM:  [id number]    Date:  2025    Primary Cardiologist:  Dr. Jerrica STARR MD         Assessment and Plan:     1.  Uncontrolled hypertension with medication non compliance:   Cont Losartan 100mg daily, amlodipine 10mg, hydralazine 75mg to TID  -Off clonidine to 0.2 TTS patch and spironolactone due to side effects?  - BP now normal since taking all meds regularly   -K levels were low despite starting Aldactone therefore KCL was also started. Last K prior to KCL was 3.3. Repeat CMP.     Previous work up in - non contrast CT abdomen negative for adrenal adenoma.    LVH noted on Echos. Last echo 24 LVEF 60-65% mod LVH, mild LAE, Ascending aorta 4.0 cm in 2024. Will need annual surveillance. Will repeat Echo.    Previously referred to nephrology for further evaluation- was released from their care      2. HDL- Per PCP, now off statin  Lab Results   Component Value Date    .4 (H) 2024      3.  History of snoring.  Mild CHAYO. Did not get mouthpiece.          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     ATTENTION:   This medical record was transcribed using an electronic medical records/speech recognition system.  Although proofread, it may and can contain electronic, spelling and other errors.  Corrections may be executed at a later time.  Please feel free to contact us for any clarifications as needed.      Subjective:     Feeling well, BP now under control with med compliance. He is going abck to the gym, boxing. Denies CP, SOB, HA.  ---------  Last seen by Dr. Starr 22.  Pt admitted -24 for uncontrolled HTN CP. His troponin was elevated but flat likely due to strain from his uncontrolled HTN. He had only been complaint with 1 out of 4 BP meds. Was seen by PCP a few days after admission medication refills given and BMP rechecked.    He

## 2025-06-06 NOTE — PROGRESS NOTES
Chief Complaint   Patient presents with    Hypertension    NSTEMI     Vitals:    06/06/25 1542   BP: 126/74   BP Site: Left Upper Arm   Patient Position: Sitting   Pulse: 68   SpO2: 98%   Weight: 103.4 kg (228 lb)   Height: 1.854 m (6' 1\")         Chest pain: DENIED     Recent hospital stays: DENIED     Refills: DENIED

## 2025-07-02 DIAGNOSIS — I1A.0 RESISTANT HYPERTENSION: ICD-10-CM

## 2025-07-02 RX ORDER — AMLODIPINE BESYLATE 10 MG/1
10 TABLET ORAL DAILY
Qty: 90 TABLET | Refills: 1 | Status: SHIPPED | OUTPATIENT
Start: 2025-07-02

## 2025-07-02 RX ORDER — LOSARTAN POTASSIUM 100 MG/1
100 TABLET ORAL DAILY
Qty: 90 TABLET | Refills: 1 | Status: SHIPPED | OUTPATIENT
Start: 2025-07-02

## 2025-07-08 ENCOUNTER — OFFICE VISIT (OUTPATIENT)
Age: 55
End: 2025-07-08
Payer: MEDICAID

## 2025-07-08 ENCOUNTER — ANCILLARY PROCEDURE (OUTPATIENT)
Age: 55
End: 2025-07-08
Payer: MEDICAID

## 2025-07-08 VITALS — HEIGHT: 73 IN | HEART RATE: 62 BPM | WEIGHT: 225.8 LBS | BODY MASS INDEX: 29.93 KG/M2

## 2025-07-08 VITALS
WEIGHT: 225.8 LBS | BODY MASS INDEX: 29.93 KG/M2 | HEIGHT: 73 IN | RESPIRATION RATE: 16 BRPM | OXYGEN SATURATION: 98 % | SYSTOLIC BLOOD PRESSURE: 138 MMHG | DIASTOLIC BLOOD PRESSURE: 84 MMHG | HEART RATE: 60 BPM

## 2025-07-08 DIAGNOSIS — I77.810 DILATED AORTIC ROOT: ICD-10-CM

## 2025-07-08 DIAGNOSIS — I10 UNCONTROLLED HYPERTENSION: ICD-10-CM

## 2025-07-08 DIAGNOSIS — I1A.0 RESISTANT HYPERTENSION: Primary | ICD-10-CM

## 2025-07-08 DIAGNOSIS — G47.33 OSA (OBSTRUCTIVE SLEEP APNEA): ICD-10-CM

## 2025-07-08 DIAGNOSIS — E78.2 MIXED HYPERLIPIDEMIA: ICD-10-CM

## 2025-07-08 DIAGNOSIS — E87.6 HYPOKALEMIA: ICD-10-CM

## 2025-07-08 DIAGNOSIS — Z91.148 NON COMPLIANCE W MEDICATION REGIMEN: ICD-10-CM

## 2025-07-08 PROCEDURE — 99214 OFFICE O/P EST MOD 30 MIN: CPT

## 2025-07-08 PROCEDURE — 3075F SYST BP GE 130 - 139MM HG: CPT

## 2025-07-08 PROCEDURE — 3079F DIAST BP 80-89 MM HG: CPT

## 2025-07-08 PROCEDURE — 93306 TTE W/DOPPLER COMPLETE: CPT | Performed by: INTERNAL MEDICINE

## 2025-07-08 NOTE — PROGRESS NOTES
Office Follow-up    NAME: Joshua Forman   :  1970  MRM:  [id number]    Date:  2025    Primary Cardiologist:  Dr. Jerrica Barnett, MARIANNE - NP         Assessment and Plan:     1.  Uncontrolled hypertension with medication non compliance:   Cont Losartan 100mg daily, amlodipine 10mg, hydralazine 75mg to TID, spironolactone 25mg  -Off clonidine to 0.2 TTS patch due to side effects?  - BP now normal since taking all meds regularly   -K levels were low despite starting Aldactone therefore KCL was also started. Last K prior to KCL was 3.3. Repeat CMP.     Previous work up in - non contrast CT abdomen negative for adrenal adenoma.    LVH noted on Echos. Last echo 24 LVEF 60-65% mod LVH, mild LAE, Ascending aorta 4.0 cm in 2024.   Today Echo 25 EF preserved, still mod LVH, aorta 4.0 both asc and root. Valve is TRI leaflet  Will need annual surveillance.     Previously referred to nephrology for further evaluation- was released from their care    2. HDL- Per PCP, now off statin  Lab Results   Component Value Date    .4 (H) 2024      3.  History of snoring.  Mild CHAYO. Did not get mouthpiece.          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 Dr. Becerril in 1 year with echo    ATTENTION:   This medical record was transcribed using an electronic medical records/speech recognition system.  Although proofread, it may and can contain electronic, spelling and other errors.  Corrections may be executed at a later time.  Please feel free to contact us for any clarifications as needed.      Subjective:     Feeling well, BP now under control with med compliance. He is going back to the gym, boxing. Denies CP, SOB, HA.  ---------  Last seen by Dr. Becerril 22.  Pt admitted -24 for uncontrolled HTN CP. His troponin was elevated but flat likely due to strain from his uncontrolled HTN. He had only been

## 2025-07-08 NOTE — PROGRESS NOTES
had concerns including Hypertension, Cholesterol Problem, and Other (NSTEMI).    Vitals:    07/08/25 1102   BP: 138/84   BP Site: Left Upper Arm   Patient Position: Sitting   BP Cuff Size: Large Adult   Pulse: 60   Resp: 16   SpO2: 98%   Weight: 102.4 kg (225 lb 12.8 oz)   Height: 1.854 m (6' 1\")        Chest pain No    Refills No        1. Have you been to the ER, urgent care clinic since your last visit? No       Hospitalized since your last visit? No       Where?        Date?

## 2025-07-13 LAB
ECHO AO ASC DIAM: 4 CM
ECHO AO ASCENDING AORTA INDEX: 1.76 CM/M2
ECHO AO ROOT DIAM: 4 CM
ECHO AO ROOT INDEX: 1.76 CM/M2
ECHO AV AREA PEAK VELOCITY: 4.2 CM2
ECHO AV AREA VTI: 4.1 CM2
ECHO AV AREA/BSA PEAK VELOCITY: 1.9 CM2/M2
ECHO AV AREA/BSA VTI: 1.8 CM2/M2
ECHO AV MEAN GRADIENT: 4 MMHG
ECHO AV MEAN VELOCITY: 1 M/S
ECHO AV PEAK GRADIENT: 8 MMHG
ECHO AV PEAK VELOCITY: 1.4 M/S
ECHO AV VELOCITY RATIO: 0.93
ECHO AV VTI: 25.6 CM
ECHO BSA: 2.3 M2
ECHO EST RA PRESSURE: 3 MMHG
ECHO LA DIAMETER INDEX: 1.81 CM/M2
ECHO LA DIAMETER: 4.1 CM
ECHO LA TO AORTIC ROOT RATIO: 1.03
ECHO LA VOL A-L A2C: 52 ML (ref 18–58)
ECHO LA VOL A-L A4C: 75 ML (ref 18–58)
ECHO LA VOL MOD A2C: 52 ML (ref 18–58)
ECHO LA VOL MOD A4C: 70 ML (ref 18–58)
ECHO LA VOLUME AREA LENGTH: 65 ML
ECHO LA VOLUME INDEX A-L A2C: 23 ML/M2 (ref 16–34)
ECHO LA VOLUME INDEX A-L A4C: 33 ML/M2 (ref 16–34)
ECHO LA VOLUME INDEX AREA LENGTH: 29 ML/M2 (ref 16–34)
ECHO LA VOLUME INDEX MOD A2C: 23 ML/M2 (ref 16–34)
ECHO LA VOLUME INDEX MOD A4C: 31 ML/M2 (ref 16–34)
ECHO LV E' LATERAL VELOCITY: 9.29 CM/S
ECHO LV E' SEPTAL VELOCITY: 3.48 CM/S
ECHO LV EDV A2C: 132 ML
ECHO LV EDV A4C: 123 ML
ECHO LV EDV BP: 130 ML (ref 67–155)
ECHO LV EDV INDEX A4C: 54 ML/M2
ECHO LV EDV INDEX BP: 57 ML/M2
ECHO LV EDV NDEX A2C: 58 ML/M2
ECHO LV EF PHYSICIAN: 62 %
ECHO LV EJECTION FRACTION A2C: 60 %
ECHO LV EJECTION FRACTION A4C: 63 %
ECHO LV EJECTION FRACTION BIPLANE: 62 % (ref 55–100)
ECHO LV ESV A2C: 53 ML
ECHO LV ESV A4C: 46 ML
ECHO LV ESV BP: 50 ML (ref 22–58)
ECHO LV ESV INDEX A2C: 23 ML/M2
ECHO LV ESV INDEX A4C: 20 ML/M2
ECHO LV ESV INDEX BP: 22 ML/M2
ECHO LV FRACTIONAL SHORTENING: 31 % (ref 28–44)
ECHO LV GLOBAL LONGITUDINAL STRAIN (GLS): -14.5 %
ECHO LV INTERNAL DIMENSION DIASTOLE INDEX: 2.29 CM/M2
ECHO LV INTERNAL DIMENSION DIASTOLIC: 5.2 CM (ref 4.2–5.9)
ECHO LV INTERNAL DIMENSION SYSTOLIC INDEX: 1.59 CM/M2
ECHO LV INTERNAL DIMENSION SYSTOLIC: 3.6 CM
ECHO LV IVSD: 1.1 CM (ref 0.6–1)
ECHO LV MASS 2D: 220.8 G (ref 88–224)
ECHO LV MASS INDEX 2D: 97.3 G/M2 (ref 49–115)
ECHO LV POSTERIOR WALL DIASTOLIC: 1.1 CM (ref 0.6–1)
ECHO LV RELATIVE WALL THICKNESS RATIO: 0.42
ECHO LVOT AREA: 4.5 CM2
ECHO LVOT AV VTI INDEX: 0.89
ECHO LVOT DIAM: 2.4 CM
ECHO LVOT MEAN GRADIENT: 3 MMHG
ECHO LVOT PEAK GRADIENT: 6 MMHG
ECHO LVOT PEAK VELOCITY: 1.3 M/S
ECHO LVOT STROKE VOLUME INDEX: 45.2 ML/M2
ECHO LVOT SV: 102.6 ML
ECHO LVOT VTI: 22.7 CM
ECHO MV A VELOCITY: 0.68 M/S
ECHO MV AREA PHT: 2.6 CM2
ECHO MV AREA VTI: 5 CM2
ECHO MV E DECELERATION TIME (DT): 286.8 MS
ECHO MV E VELOCITY: 0.55 M/S
ECHO MV E/A RATIO: 0.81
ECHO MV E/E' LATERAL: 5.92
ECHO MV E/E' RATIO (AVERAGED): 10.86
ECHO MV E/E' SEPTAL: 15.8
ECHO MV LVOT VTI INDEX: 0.9
ECHO MV MAX VELOCITY: 0.8 M/S
ECHO MV MEAN GRADIENT: 1 MMHG
ECHO MV MEAN VELOCITY: 0.4 M/S
ECHO MV PEAK GRADIENT: 2 MMHG
ECHO MV PRESSURE HALF TIME (PHT): 83.2 MS
ECHO MV VTI: 20.5 CM
ECHO RA AREA 4C: 19.7 CM2
ECHO RA END SYSTOLIC VOLUME APICAL 4 CHAMBER INDEX BSA: 25 ML/M2
ECHO RA VOLUME: 56 ML
ECHO RV FREE WALL PEAK S': 10.1 CM/S
ECHO RV INTERNAL DIMENSION: 4.1 CM
ECHO RV TAPSE: 1.8 CM (ref 1.7–?)

## 2025-07-13 PROCEDURE — 93356 MYOCRD STRAIN IMG SPCKL TRCK: CPT | Performed by: INTERNAL MEDICINE

## 2025-07-13 PROCEDURE — 93306 TTE W/DOPPLER COMPLETE: CPT | Performed by: INTERNAL MEDICINE

## 2025-07-16 ENCOUNTER — TELEPHONE (OUTPATIENT)
Age: 55
End: 2025-07-16

## 2025-07-16 DIAGNOSIS — I43 AMYLOID HEART DISEASE (HCC): Primary | ICD-10-CM

## 2025-07-16 DIAGNOSIS — E85.4 AMYLOID HEART DISEASE (HCC): Primary | ICD-10-CM

## 2025-07-16 NOTE — TELEPHONE ENCOUNTER
ROGERS. received from Dr. 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     Refer to heart failure for evaluation.     ####################################    Unable to reach the pt, the line rings several time, then goes dead. Unable to leave a message. Pt not currently active on [facility], but I will try to send a message there. I will try calling 2 more time later today. If I am unable to reach pt I will send a letter as well.

## 2025-07-17 NOTE — TELEPHONE ENCOUNTER
Second attempt to call pt. Again the phone rang a few time then went dead.  Will try once more tomorrow.

## 2025-07-18 ENCOUNTER — TELEPHONE (OUTPATIENT)
Age: 55
End: 2025-07-18

## 2025-07-18 NOTE — TELEPHONE ENCOUNTER
Attempted to contact patient to schedule new patient appointment. Unable to leave a message as the phone rings about 4 times and then it hangs up automatically. Will attempt contact at a later date.    JONAH Pickens

## 2025-07-22 NOTE — TELEPHONE ENCOUNTER
Unable to reach pt. Message left with HIPPA compliant message and call back number    This was my 3rd attempt to call the pt. I will compose a letter with his results and recommendations to be mailed to him.

## 2025-08-05 ENCOUNTER — HOSPITAL ENCOUNTER (EMERGENCY)
Facility: HOSPITAL | Age: 55
Discharge: HOME | End: 2025-08-05
Attending: STUDENT IN AN ORGANIZED HEALTH CARE EDUCATION/TRAINING PROGRAM
Payer: MEDICAID

## 2025-08-05 ENCOUNTER — APPOINTMENT (OUTPATIENT)
Facility: HOSPITAL | Age: 55
End: 2025-08-05
Payer: MEDICAID

## 2025-08-05 VITALS
HEIGHT: 73 IN | WEIGHT: 225 LBS | HEART RATE: 58 BPM | RESPIRATION RATE: 16 BRPM | DIASTOLIC BLOOD PRESSURE: 104 MMHG | TEMPERATURE: 98.3 F | BODY MASS INDEX: 29.82 KG/M2 | SYSTOLIC BLOOD PRESSURE: 172 MMHG | OXYGEN SATURATION: 98 %

## 2025-08-05 DIAGNOSIS — R07.9 CHEST PAIN, UNSPECIFIED TYPE: Primary | ICD-10-CM

## 2025-08-05 DIAGNOSIS — I10 HYPERTENSION, UNSPECIFIED TYPE: ICD-10-CM

## 2025-08-05 LAB
ALBUMIN SERPL-MCNC: 4.3 G/DL (ref 3.8–4.9)
ALBUMIN SERPL-MCNC: 4.4 G/DL (ref 3.5–5.2)
ALBUMIN/GLOB SERPL: 1.4 (ref 1.1–2.2)
ALP SERPL-CCNC: 73 U/L (ref 40–129)
ALP SERPL-CCNC: 79 IU/L (ref 44–121)
ALT SERPL-CCNC: 16 IU/L (ref 0–44)
ALT SERPL-CCNC: 22 U/L (ref 10–50)
ANION GAP SERPL CALC-SCNC: 13 MMOL/L (ref 2–14)
AST SERPL-CCNC: 21 IU/L (ref 0–40)
AST SERPL-CCNC: 27 U/L (ref 10–50)
BASOPHILS # BLD: 0.03 K/UL (ref 0–0.1)
BASOPHILS NFR BLD: 0.8 % (ref 0–1)
BILIRUB SERPL-MCNC: 0.2 MG/DL (ref 0–1.2)
BILIRUB SERPL-MCNC: 0.4 MG/DL (ref 0–1.2)
BUN SERPL-MCNC: 11 MG/DL (ref 6–24)
BUN SERPL-MCNC: 13 MG/DL (ref 6–20)
BUN/CREAT SERPL: 10 (ref 12–20)
BUN/CREAT SERPL: 10 (ref 9–20)
CALCIUM SERPL-MCNC: 8.9 MG/DL (ref 8.7–10.2)
CALCIUM SERPL-MCNC: 9.4 MG/DL (ref 8.6–10)
CHLORIDE SERPL-SCNC: 100 MMOL/L (ref 96–106)
CHLORIDE SERPL-SCNC: 102 MMOL/L (ref 98–107)
CHOLEST SERPL-MCNC: 143 MG/DL (ref 100–199)
CO2 SERPL-SCNC: 26 MMOL/L (ref 20–29)
CO2 SERPL-SCNC: 27 MMOL/L (ref 20–29)
COMMENT:: NORMAL
CREAT SERPL-MCNC: 1.15 MG/DL (ref 0.76–1.27)
CREAT SERPL-MCNC: 1.26 MG/DL (ref 0.7–1.2)
DIFFERENTIAL METHOD BLD: ABNORMAL
EGFRCR SERPLBLD CKD-EPI 2021: 76 ML/MIN/1.73
EOSINOPHIL # BLD: 0.12 K/UL (ref 0–0.4)
EOSINOPHIL NFR BLD: 3.3 % (ref 0–7)
ERYTHROCYTE [DISTWIDTH] IN BLOOD BY AUTOMATED COUNT: 12.5 % (ref 11.5–14.5)
GLOBULIN SER CALC-MCNC: 2.5 G/DL (ref 1.5–4.5)
GLOBULIN SER CALC-MCNC: 3.2 G/DL (ref 2–4)
GLUCOSE SERPL-MCNC: 141 MG/DL (ref 65–100)
GLUCOSE SERPL-MCNC: 89 MG/DL (ref 70–99)
HCT VFR BLD AUTO: 38.9 % (ref 36.6–50.3)
HDLC SERPL-MCNC: 40 MG/DL
HGB BLD-MCNC: 13.4 G/DL (ref 12.1–17)
IMM GRANULOCYTES # BLD AUTO: 0.01 K/UL (ref 0–0.04)
IMM GRANULOCYTES NFR BLD AUTO: 0.3 % (ref 0–0.5)
IMP & REVIEW OF LAB RESULTS: NORMAL
LDLC SERPL CALC-MCNC: 85 MG/DL (ref 0–99)
LYMPHOCYTES # BLD: 1.25 K/UL (ref 0.8–3.5)
LYMPHOCYTES NFR BLD: 34.2 % (ref 12–49)
MCH RBC QN AUTO: 26.8 PG (ref 26–34)
MCHC RBC AUTO-ENTMCNC: 34.4 G/DL (ref 30–36.5)
MCV RBC AUTO: 77.8 FL (ref 80–99)
MONOCYTES # BLD: 0.22 K/UL (ref 0–1)
MONOCYTES NFR BLD: 6 % (ref 5–13)
NEUTS SEG # BLD: 2.02 K/UL (ref 1.8–8)
NEUTS SEG NFR BLD: 55.4 % (ref 32–75)
NRBC # BLD: 0 K/UL (ref 0–0.01)
NRBC BLD-RTO: 0 PER 100 WBC
NT PRO BNP: 70 PG/ML (ref 0–125)
PLATELET # BLD AUTO: 324 K/UL (ref 150–400)
PMV BLD AUTO: 9.5 FL (ref 8.9–12.9)
POTASSIUM SERPL-SCNC: 3.1 MMOL/L (ref 3.5–5.1)
POTASSIUM SERPL-SCNC: 3.3 MMOL/L (ref 3.5–5.2)
PROT SERPL-MCNC: 6.8 G/DL (ref 6–8.5)
PROT SERPL-MCNC: 7.5 G/DL (ref 6.4–8.3)
RBC # BLD AUTO: 5 M/UL (ref 4.1–5.7)
SODIUM SERPL-SCNC: 141 MMOL/L (ref 134–144)
SODIUM SERPL-SCNC: 143 MMOL/L (ref 136–145)
SPECIMEN HOLD: NORMAL
TRIGL SERPL-MCNC: 93 MG/DL (ref 0–149)
TROPONIN T SERPL HS-MCNC: 12.6 NG/L (ref 0–22)
TROPONIN T SERPL HS-MCNC: 17.7 NG/L (ref 0–22)
VLDLC SERPL CALC-MCNC: 18 MG/DL (ref 5–40)
WBC # BLD AUTO: 3.7 K/UL (ref 4.1–11.1)

## 2025-08-05 PROCEDURE — 71045 X-RAY EXAM CHEST 1 VIEW: CPT

## 2025-08-05 PROCEDURE — 84484 ASSAY OF TROPONIN QUANT: CPT

## 2025-08-05 PROCEDURE — 36415 COLL VENOUS BLD VENIPUNCTURE: CPT

## 2025-08-05 PROCEDURE — 94761 N-INVAS EAR/PLS OXIMETRY MLT: CPT

## 2025-08-05 PROCEDURE — 80053 COMPREHEN METABOLIC PANEL: CPT

## 2025-08-05 PROCEDURE — 93005 ELECTROCARDIOGRAM TRACING: CPT | Performed by: STUDENT IN AN ORGANIZED HEALTH CARE EDUCATION/TRAINING PROGRAM

## 2025-08-05 PROCEDURE — 85025 COMPLETE CBC W/AUTO DIFF WBC: CPT

## 2025-08-05 PROCEDURE — 99285 EMERGENCY DEPT VISIT HI MDM: CPT

## 2025-08-05 PROCEDURE — 83880 ASSAY OF NATRIURETIC PEPTIDE: CPT

## 2025-08-05 RX ORDER — MIDAZOLAM HYDROCHLORIDE 2 MG/2ML
2 INJECTION, SOLUTION INTRAMUSCULAR; INTRAVENOUS ONCE
Status: DISCONTINUED | OUTPATIENT
Start: 2025-08-05 | End: 2025-08-05

## 2025-08-05 ASSESSMENT — PAIN SCALES - GENERAL: PAINLEVEL_OUTOF10: 5

## 2025-08-05 ASSESSMENT — LIFESTYLE VARIABLES
HOW MANY STANDARD DRINKS CONTAINING ALCOHOL DO YOU HAVE ON A TYPICAL DAY: 1 OR 2
HOW OFTEN DO YOU HAVE A DRINK CONTAINING ALCOHOL: 2-4 TIMES A MONTH

## 2025-08-05 ASSESSMENT — PAIN - FUNCTIONAL ASSESSMENT: PAIN_FUNCTIONAL_ASSESSMENT: 0-10

## 2025-08-06 LAB
EKG ATRIAL RATE: 65 BPM
EKG DIAGNOSIS: NORMAL
EKG P AXIS: 59 DEGREES
EKG P-R INTERVAL: 212 MS
EKG Q-T INTERVAL: 388 MS
EKG QRS DURATION: 92 MS
EKG QTC CALCULATION (BAZETT): 403 MS
EKG R AXIS: -22 DEGREES
EKG T AXIS: 209 DEGREES
EKG VENTRICULAR RATE: 65 BPM

## 2025-08-06 PROCEDURE — 93010 ELECTROCARDIOGRAM REPORT: CPT | Performed by: INTERNAL MEDICINE

## 2025-08-09 ASSESSMENT — HEART SCORE: ECG: NON-SPECIFC REPOLARIZATION DISTURBANCE/LBTB/PM

## 2025-08-13 ENCOUNTER — OFFICE VISIT (OUTPATIENT)
Age: 55
End: 2025-08-13

## 2025-08-13 ENCOUNTER — TELEPHONE (OUTPATIENT)
Age: 55
End: 2025-08-13

## 2025-08-13 VITALS
HEIGHT: 73 IN | HEART RATE: 74 BPM | OXYGEN SATURATION: 98 % | DIASTOLIC BLOOD PRESSURE: 88 MMHG | BODY MASS INDEX: 29.93 KG/M2 | SYSTOLIC BLOOD PRESSURE: 144 MMHG | TEMPERATURE: 98.7 F | WEIGHT: 225.8 LBS | RESPIRATION RATE: 16 BRPM

## 2025-08-13 DIAGNOSIS — I1A.0 RESISTANT HYPERTENSION: ICD-10-CM

## 2025-08-13 DIAGNOSIS — I11.9 HYPERTENSIVE HEART DISEASE WITHOUT HEART FAILURE: Primary | ICD-10-CM

## 2025-08-13 DIAGNOSIS — I43 AMYLOID HEART DISEASE (HCC): Primary | ICD-10-CM

## 2025-08-13 DIAGNOSIS — E85.4 AMYLOID HEART DISEASE (HCC): Primary | ICD-10-CM

## 2025-08-13 DIAGNOSIS — I11.9 HYPERTENSIVE HEART DISEASE WITHOUT HEART FAILURE: ICD-10-CM

## 2025-08-13 DIAGNOSIS — I51.7 LVH (LEFT VENTRICULAR HYPERTROPHY): ICD-10-CM

## 2025-08-13 RX ORDER — HYDRALAZINE HYDROCHLORIDE 100 MG/1
100 TABLET, FILM COATED ORAL 3 TIMES DAILY
Qty: 60 TABLET | Refills: 3 | Status: SHIPPED | OUTPATIENT
Start: 2025-08-13

## 2025-08-13 ASSESSMENT — PATIENT HEALTH QUESTIONNAIRE - PHQ9
SUM OF ALL RESPONSES TO PHQ QUESTIONS 1-9: 0
2. FEELING DOWN, DEPRESSED OR HOPELESS: NOT AT ALL
SUM OF ALL RESPONSES TO PHQ QUESTIONS 1-9: 0
1. LITTLE INTEREST OR PLEASURE IN DOING THINGS: NOT AT ALL
SUM OF ALL RESPONSES TO PHQ QUESTIONS 1-9: 0
SUM OF ALL RESPONSES TO PHQ QUESTIONS 1-9: 0

## 2025-08-18 ENCOUNTER — RESULTS FOLLOW-UP (OUTPATIENT)
Age: 55
End: 2025-08-18

## 2025-08-18 LAB
ALBUMIN SERPL ELPH-MCNC: 3.9 G/DL (ref 2.9–4.4)
ALBUMIN/GLOB SERPL: 1.3 (ref 0.7–1.7)
ALPHA1 GLOB SERPL ELPH-MCNC: 0.2 G/DL (ref 0–0.4)
ALPHA2 GLOB SERPL ELPH-MCNC: 0.6 G/DL (ref 0.4–1)
B-GLOBULIN SERPL ELPH-MCNC: 1.2 G/DL (ref 0.7–1.3)
GAMMA GLOB SERPL ELPH-MCNC: 1.3 G/DL (ref 0.4–1.8)
GLOBULIN SER-MCNC: 3.2 G/DL (ref 2.2–3.9)
IGA SERPL-MCNC: 260 MG/DL (ref 90–386)
IGG SERPL-MCNC: 1290 MG/DL (ref 603–1613)
IGM SERPL-MCNC: 85 MG/DL (ref 20–172)
INTERPRETATION SERPL IEP-IMP: ABNORMAL
KAPPA LC FREE SER-MCNC: 22.2 MG/L (ref 3.3–19.4)
KAPPA LC FREE/LAMBDA FREE SER: 1.52 (ref 0.26–1.65)
LAMBDA LC FREE SERPL-MCNC: 14.6 MG/L (ref 5.7–26.3)
M PROTEIN SERPL ELPH-MCNC: ABNORMAL G/DL
PROT SERPL-MCNC: 7.1 G/DL (ref 6–8.5)

## 2025-08-20 RX ORDER — POTASSIUM CHLORIDE 1500 MG/1
40 TABLET, EXTENDED RELEASE ORAL 2 TIMES DAILY
Qty: 360 TABLET | Refills: 3 | Status: SHIPPED | OUTPATIENT
Start: 2025-08-20

## 2025-08-27 LAB
ALDOST SERPL-MCNC: 21.9 NG/DL (ref 0–30)
ALDOST/RENIN PLAS-RTO: 88.3 {RATIO} (ref 0–30)
RENIN PLAS-CCNC: 0.25 NG/ML/HR (ref 0.17–5.38)

## 2025-08-28 ENCOUNTER — RESULTS FOLLOW-UP (OUTPATIENT)
Age: 55
End: 2025-08-28

## 2025-08-28 DIAGNOSIS — E26.09 PRIMARY HYPERALDOSTERONISM: Primary | ICD-10-CM

## 2025-08-29 ENCOUNTER — HOSPITAL ENCOUNTER (OUTPATIENT)
Facility: HOSPITAL | Age: 55
Discharge: HOME | End: 2025-08-31
Attending: INTERNAL MEDICINE
Payer: MEDICAID

## 2025-08-29 DIAGNOSIS — I43 AMYLOID HEART DISEASE (HCC): ICD-10-CM

## 2025-08-29 DIAGNOSIS — E85.4 AMYLOID HEART DISEASE (HCC): ICD-10-CM

## 2025-08-29 PROCEDURE — 93975 VASCULAR STUDY: CPT

## 2025-08-31 LAB
VAS AORTA INFRARENAL PSV: 103 CM/S
VAS AORTA MID PSV: 103 CM/S
VAS L RENAL ACC DIST RI: 0.69
VAS L RENAL ACC MID RI: 0.67
VAS L RENAL ACC PROX RI: 0.74
VAS L RENAL ORIG RI: 0.78
VAS LEFT ACCESSORY RENAL DIST EDV: 20.6 CM/S
VAS LEFT ACCESSORY RENAL DIST PSV: 67.4 CM/S
VAS LEFT ACCESSORY RENAL DIST RAR: 0.65
VAS LEFT ACCESSORY RENAL MID EDV: 21.9 CM/S
VAS LEFT ACCESSORY RENAL MID PSV: 66.4 CM/S
VAS LEFT ACCESSORY RENAL MID RAR: 0.64
VAS LEFT ACCESSORY RENAL PROX EDV: 19.1 CM/S
VAS LEFT ACCESSORY RENAL PROX PSV: 72.4 CM/S
VAS LEFT ACCESSORY RENAL PROX RAR: 0.7
VAS LEFT KIDNEY LENGTH: 11.1 CM
VAS LEFT RENAL DIST EDV: 22.1 CM/S
VAS LEFT RENAL DIST PSV: 67.9 CM/S
VAS LEFT RENAL DIST RAR: 0.66
VAS LEFT RENAL DIST RI: 0.67
VAS LEFT RENAL LOWER PARENCHYMA EDV: 6.6 CM/S
VAS LEFT RENAL LOWER PARENCHYMA PSV: 24.7 CM/S
VAS LEFT RENAL LOWER PARENCHYMA RI: 0.73
VAS LEFT RENAL MID EDV: 15.9 CM/S
VAS LEFT RENAL MID PSV: 46.3 CM/S
VAS LEFT RENAL MID RAR: 0.45
VAS LEFT RENAL MID RI: 0.66
VAS LEFT RENAL ORIGIN EDV: 24.7 CM/S
VAS LEFT RENAL ORIGIN PSV: 112 CM/S
VAS LEFT RENAL ORIGIN RAR: 1.09
VAS LEFT RENAL PROX EDV: 25.5 CM/S
VAS LEFT RENAL PROX PSV: 87.4 CM/S
VAS LEFT RENAL PROX RAR: 0.85
VAS LEFT RENAL PROX RI: 0.71
VAS LEFT RENAL RAR: 1.09
VAS LEFT RENAL UPPER PARENCHYMA EDV: 10.3 CM/S
VAS LEFT RENAL UPPER PARENCHYMA PSV: 31.8 CM/S
VAS LEFT RENAL UPPER PARENCHYMA RI: 0.68
VAS LEFT SEGMENTAL RENAL ARTERY EDV: 10.3 CM/S
VAS LEFT SEGMENTAL RENAL ARTERY EDV: 6.6 CM/S
VAS LEFT SEGMENTAL RENAL ARTERY PSV: 24.7 CM/S
VAS LEFT SEGMENTAL RENAL ARTERY PSV: 31.8 CM/S
VAS RIGHT KIDNEY LENGTH: 12.93 CM
VAS RIGHT RENAL DIST EDV: 13.7 CM/S
VAS RIGHT RENAL DIST PSV: 59.8 CM/S
VAS RIGHT RENAL DIST RAR: 0.58
VAS RIGHT RENAL DIST RI: 0.77
VAS RIGHT RENAL LOWER PARENCHYMA EDV: 16.6 CM/S
VAS RIGHT RENAL LOWER PARENCHYMA PSV: 47 CM/S
VAS RIGHT RENAL LOWER PARENCHYMA RI: 0.65
VAS RIGHT RENAL MID EDV: 17.2 CM/S
VAS RIGHT RENAL MID PSV: 79 CM/S
VAS RIGHT RENAL MID RAR: 0.77
VAS RIGHT RENAL MID RI: 0.78
VAS RIGHT RENAL ORIGIN EDV: 47.9 CM/S
VAS RIGHT RENAL ORIGIN PSV: 133 CM/S
VAS RIGHT RENAL ORIGIN RAR: 1.29
VAS RIGHT RENAL ORIGIN RI: 0.64
VAS RIGHT RENAL PROX EDV: 14.8 CM/S
VAS RIGHT RENAL PROX PSV: 89.6 CM/S
VAS RIGHT RENAL PROX RAR: 0.87
VAS RIGHT RENAL PROX RI: 0.83
VAS RIGHT RENAL RAR: 1.29
VAS RIGHT RENAL UPPER PARENCHYMA EDV: 10.4 CM/S
VAS RIGHT RENAL UPPER PARENCHYMA PSV: 33.9 CM/S
VAS RIGHT RENAL UPPER PARENCHYMA RI: 0.69
VAS RIGHT SEGMENTAL RENAL ARTERY EDV: 10.4 CM/S
VAS RIGHT SEGMENTAL RENAL ARTERY EDV: 16.6 CM/S
VAS RIGHT SEGMENTAL RENAL ARTERY PSV: 33.9 CM/S
VAS RIGHT SEGMENTAL RENAL ARTERY PSV: 47 CM/S

## 2025-08-31 PROCEDURE — 93975 VASCULAR STUDY: CPT | Performed by: SURGERY
